# Patient Record
Sex: FEMALE | Race: WHITE | NOT HISPANIC OR LATINO | Employment: OTHER | URBAN - METROPOLITAN AREA
[De-identification: names, ages, dates, MRNs, and addresses within clinical notes are randomized per-mention and may not be internally consistent; named-entity substitution may affect disease eponyms.]

---

## 2017-03-21 ENCOUNTER — OFFICE VISIT (OUTPATIENT)
Dept: GASTROENTEROLOGY | Facility: CLINIC | Age: 72
End: 2017-03-21

## 2017-03-21 VITALS
SYSTOLIC BLOOD PRESSURE: 134 MMHG | HEIGHT: 63 IN | DIASTOLIC BLOOD PRESSURE: 82 MMHG | WEIGHT: 149 LBS | BODY MASS INDEX: 26.4 KG/M2

## 2017-03-21 DIAGNOSIS — R10.13 EPIGASTRIC PAIN: Primary | ICD-10-CM

## 2017-03-21 PROCEDURE — 99213 OFFICE O/P EST LOW 20 MIN: CPT | Performed by: INTERNAL MEDICINE

## 2017-03-21 RX ORDER — LOTEPREDNOL ETABONATE 5 MG/G
1 GEL OPHTHALMIC 4 TIMES DAILY PRN
COMMUNITY
Start: 2016-12-31

## 2017-03-21 NOTE — PROGRESS NOTES
"Chief Complaint   Patient presents with   • Abdominal Pain       Tish Urias is a  71 y.o. female here for a follow up visit for episodic abdominal pain.    HPI    Patient 71-year-old female status post ERCP and cystectomy.  Patient reports still getting intermittent epigastric pain approximately every 2 weeks since surgery.  Pain similar to pain prior to surgery.  Symptoms seem to be related to eating most notably after large meals.  Patient reports being at a restaurant recently and not making it to the parking lot before she started having severe abdominal cramps.  Patient reports pains typically last about 10 minutes and resolves spontaneously.  Sometimes related to bowel movements sometimes not.  Patient did have vomiting before the cholecystectomy but not since.  Patient with no fever or chills reports liver enzymes done family doctor were negative.    Past Medical History   Diagnosis Date   • Allergic rhinitis    • Anemia      HAS TAKEN IRON SUPPLEMENTS TO PREVENT   • Anesthesia complication      WITH CARPAL TUNNEL RELEASE, \"RELAXING MEDICINE\" HAD ISSUES WITH ITCHING\"   • Gallstones    • GERD (gastroesophageal reflux disease)    • History of osteoporosis    • History of varicose veins      WITH TORI LEG VEIN STRIPPING   • Hypertension    • Hypothyroidism    • Trigeminal neuralgia          Current Outpatient Prescriptions:   •  aspirin 81 MG tablet, Take 81 mg by mouth Daily., Disp: , Rfl:   •  atenolol (TENORMIN) 50 MG tablet, Take 50 mg by mouth Daily., Disp: , Rfl:   •  carBAMazepine (CARBATROL) 100 MG 12 hr capsule, Take 100 mg by mouth Every Evening., Disp: , Rfl:   •  cholecalciferol (VITAMIN D3) 1000 UNITS tablet, Take 1,000 Units by mouth Daily., Disp: , Rfl:   •  Ketotifen Fumarate (ALAWAY OP), Apply  to eye., Disp: , Rfl:   •  levothyroxine (SYNTHROID, LEVOTHROID) 88 MCG tablet, Take 88 mcg by mouth Daily., Disp: , Rfl:   •  loratadine (CLARITIN) 10 MG tablet, Take 10 mg by mouth Daily., Disp: , Rfl: "   •  LOTEMAX 0.5 % gel ophthalmic gel, , Disp: , Rfl:   •  meloxicam (MOBIC) 15 MG tablet, Take 15 mg by mouth Daily., Disp: , Rfl:   •  Nutritional Supplements (SALMON OIL) capsule, Take 1 capsule by mouth Daily., Disp: , Rfl:   •  omeprazole (priLOSEC) 20 MG capsule, Take 20 mg by mouth Daily., Disp: , Rfl:   •  Unable to find, 1 each 1 (One) Time. Steroid base opthalmic drop, Disp: , Rfl:     Allergies   Allergen Reactions   • Penicillins Rash       Social History     Social History   • Marital status:      Spouse name: N/A   • Number of children: N/A   • Years of education: N/A     Occupational History   • Not on file.     Social History Main Topics   • Smoking status: Never Smoker   • Smokeless tobacco: Not on file   • Alcohol use No   • Drug use: No   • Sexual activity: Defer     Other Topics Concern   • Not on file     Social History Narrative       History reviewed. No pertinent family history.    Review of Systems   Constitutional: Negative.    Respiratory: Negative.    Cardiovascular: Negative.    Gastrointestinal: Positive for abdominal pain and nausea. Negative for anal bleeding, blood in stool, constipation, diarrhea and vomiting.   Musculoskeletal: Negative.    Skin: Negative.        Vitals:    03/21/17 0939   BP: 134/82       Physical Exam   Constitutional: She is oriented to person, place, and time. She appears well-developed and well-nourished.   HENT:   Head: Normocephalic and atraumatic.   Eyes: EOM are normal. Pupils are equal, round, and reactive to light. No scleral icterus.   Cardiovascular: Normal rate, regular rhythm and normal heart sounds.  Exam reveals no gallop and no friction rub.    No murmur heard.  Pulmonary/Chest: Effort normal.   Abdominal: Soft. Bowel sounds are normal. She exhibits no distension and no mass. There is no tenderness. No hernia.   Musculoskeletal: Normal range of motion.   Neurological: She is alert and oriented to person, place, and time.   Skin: Skin is  dry. No rash noted.   Psychiatric: She has a normal mood and affect. Her behavior is normal.   Vitals reviewed.      No visits with results within 2 Month(s) from this visit.  Latest known visit with results is:    Admission on 12/16/2016, Discharged on 12/17/2016   Component Date Value Ref Range Status   • Case Report 12/16/2016    Final                    Value:Surgical Pathology Report                         Case: [id number]                                  Authorizing Provider:  Chaitanya Elizabeth MD        Collected:           12/16/2016 12:19 PM          Ordering Location:     [facility]  Received:            12/16/2016 01:51 PM                                 [facility]                                                                       Pathologist:           Hoang Sommers MD                                                          Specimen:    Gallbladder, gall bladder                                                                 • Final Diagnosis 12/16/2016    Final                    Value:This result contains rich text formatting which cannot be displayed here.   • Gross Description 12/16/2016    Final                    Value:This result contains rich text formatting which cannot be displayed here.   • Microscopic Description 12/16/2016    Final                    Value:This result contains rich text formatting which cannot be displayed here.   • Glucose 12/17/2016 113* 65 - 99 mg/dL Final   • BUN 12/17/2016 13  8 - 23 mg/dL Final   • Creatinine 12/17/2016 0.98  0.57 - 1.00 mg/dL Final   • Sodium 12/17/2016 140  136 - 145 mmol/L Final   • Potassium 12/17/2016 4.2  3.5 - 5.2 mmol/L Final   • Chloride 12/17/2016 102  98 - 107 mmol/L Final   • CO2 12/17/2016 24.2  22.0 - 29.0 mmol/L Final   • Calcium 12/17/2016 9.0  8.6 - 10.5 mg/dL Final   • eGFR Non African Amer 12/17/2016 56* >60 mL/min/1.73 Final   • BUN/Creatinine Ratio 12/17/2016 13.3  7.0 - 25.0 Final   • Anion Gap 12/17/2016 13.8   mmol/L Final   • WBC 12/17/2016 9.82  4.50 - 10.70 10*3/mm3 Final   • RBC 12/17/2016 3.91  3.90 - 5.20 10*6/mm3 Final   • Hemoglobin 12/17/2016 11.4* 11.9 - 15.5 g/dL Final   • Hematocrit 12/17/2016 35.5* 35.6 - 45.5 % Final   • MCV 12/17/2016 90.8  80.5 - 98.2 fL Final   • MCH 12/17/2016 29.2  26.9 - 32.0 pg Final   • MCHC 12/17/2016 32.1* 32.4 - 36.3 g/dL Final   • RDW 12/17/2016 14.2* 11.7 - 13.0 % Final   • RDW-SD 12/17/2016 46.7  37.0 - 54.0 fl Final   • MPV 12/17/2016 10.0  6.0 - 12.0 fL Final   • Platelets 12/17/2016 231  140 - 500 10*3/mm3 Final   • Neutrophil % 12/17/2016 70.3  42.7 - 76.0 % Final   • Lymphocyte % 12/17/2016 21.8  19.6 - 45.3 % Final   • Monocyte % 12/17/2016 7.1  5.0 - 12.0 % Final   • Eosinophil % 12/17/2016 0.4  0.3 - 6.2 % Final   • Basophil % 12/17/2016 0.2  0.0 - 1.5 % Final   • Immature Grans % 12/17/2016 0.2  0.0 - 0.5 % Final   • Neutrophils, Absolute 12/17/2016 6.90  1.90 - 8.10 10*3/mm3 Final   • Lymphocytes, Absolute 12/17/2016 2.14  0.90 - 4.80 10*3/mm3 Final   • Monocytes, Absolute 12/17/2016 0.70  0.20 - 1.20 10*3/mm3 Final   • Eosinophils, Absolute 12/17/2016 0.04  0.00 - 0.70 10*3/mm3 Final   • Basophils, Absolute 12/17/2016 0.02  0.00 - 0.20 10*3/mm3 Final   • Immature Grans, Absolute 12/17/2016 0.02  0.00 - 0.03 10*3/mm3 Final       Tish was seen today for abdominal pain.    Diagnoses and all orders for this visit:    Epigastric pain  -     Case Request; Standing  -     Case Request    Other orders  -     Implement Anesthesia orders day of procedure.; Standing  -     Obtain informed consent; Standing       Patient status post laparoscopic cholecystectomy and ERCP for gallstones and common duct stones reports still having episodes of epigastric pain and post prandial lasting up to about 10 minutes.  Patient reports symptoms disappear spontaneously off and related to a bowel movement. Pain almost allways associated with eating particularly large meals.  At this point will  arrange EGD as symptoms always seem to be related to eating.  Consider biopsies for celiac.  And follow clinical response.

## 2017-04-17 ENCOUNTER — HOSPITAL ENCOUNTER (OUTPATIENT)
Facility: HOSPITAL | Age: 72
Setting detail: HOSPITAL OUTPATIENT SURGERY
Discharge: HOME | End: 2017-04-17
Attending: INTERNAL MEDICINE | Admitting: INTERNAL MEDICINE

## 2017-04-17 ENCOUNTER — ANESTHESIA (OUTPATIENT)
Dept: GASTROENTEROLOGY | Facility: HOSPITAL | Age: 72
End: 2017-04-17

## 2017-04-17 ENCOUNTER — ANESTHESIA EVENT (OUTPATIENT)
Dept: GASTROENTEROLOGY | Facility: HOSPITAL | Age: 72
End: 2017-04-17

## 2017-04-17 VITALS
RESPIRATION RATE: 16 BRPM | HEART RATE: 56 BPM | OXYGEN SATURATION: 97 % | HEIGHT: 63 IN | SYSTOLIC BLOOD PRESSURE: 114 MMHG | TEMPERATURE: 97.8 F | WEIGHT: 145.06 LBS | BODY MASS INDEX: 25.7 KG/M2 | DIASTOLIC BLOOD PRESSURE: 80 MMHG

## 2017-04-17 DIAGNOSIS — R10.13 EPIGASTRIC PAIN: ICD-10-CM

## 2017-04-17 LAB
ANION GAP SERPL CALCULATED.3IONS-SCNC: 10.5 MMOL/L
BUN BLD-MCNC: 15 MG/DL (ref 8–23)
BUN/CREAT SERPL: 17.2 (ref 7–25)
CALCIUM SPEC-SCNC: 8.9 MG/DL (ref 8.6–10.5)
CHLORIDE SERPL-SCNC: 106 MMOL/L (ref 98–107)
CO2 SERPL-SCNC: 27.5 MMOL/L (ref 22–29)
CREAT BLD-MCNC: 0.87 MG/DL (ref 0.57–1)
GFR SERPL CREATININE-BSD FRML MDRD: 65 ML/MIN/1.73
GLUCOSE BLD-MCNC: 91 MG/DL (ref 65–99)
POTASSIUM BLD-SCNC: 5 MMOL/L (ref 3.5–5.2)
SODIUM BLD-SCNC: 144 MMOL/L (ref 136–145)

## 2017-04-17 PROCEDURE — 83516 IMMUNOASSAY NONANTIBODY: CPT | Performed by: INTERNAL MEDICINE

## 2017-04-17 PROCEDURE — 88312 SPECIAL STAINS GROUP 1: CPT | Performed by: INTERNAL MEDICINE

## 2017-04-17 PROCEDURE — 80048 BASIC METABOLIC PNL TOTAL CA: CPT | Performed by: INTERNAL MEDICINE

## 2017-04-17 PROCEDURE — 88305 TISSUE EXAM BY PATHOLOGIST: CPT | Performed by: INTERNAL MEDICINE

## 2017-04-17 PROCEDURE — 25010000002 PROPOFOL 10 MG/ML EMULSION: Performed by: ANESTHESIOLOGY

## 2017-04-17 PROCEDURE — 43239 EGD BIOPSY SINGLE/MULTIPLE: CPT | Performed by: INTERNAL MEDICINE

## 2017-04-17 PROCEDURE — 36415 COLL VENOUS BLD VENIPUNCTURE: CPT | Performed by: INTERNAL MEDICINE

## 2017-04-17 RX ORDER — LIDOCAINE HYDROCHLORIDE 20 MG/ML
INJECTION, SOLUTION INFILTRATION; PERINEURAL AS NEEDED
Status: DISCONTINUED | OUTPATIENT
Start: 2017-04-17 | End: 2017-04-17 | Stop reason: SURG

## 2017-04-17 RX ORDER — PROPOFOL 10 MG/ML
VIAL (ML) INTRAVENOUS CONTINUOUS PRN
Status: DISCONTINUED | OUTPATIENT
Start: 2017-04-17 | End: 2017-04-17 | Stop reason: SURG

## 2017-04-17 RX ORDER — PROPOFOL 10 MG/ML
VIAL (ML) INTRAVENOUS AS NEEDED
Status: DISCONTINUED | OUTPATIENT
Start: 2017-04-17 | End: 2017-04-17 | Stop reason: SURG

## 2017-04-17 RX ORDER — SODIUM CHLORIDE, SODIUM LACTATE, POTASSIUM CHLORIDE, CALCIUM CHLORIDE 600; 310; 30; 20 MG/100ML; MG/100ML; MG/100ML; MG/100ML
1000 INJECTION, SOLUTION INTRAVENOUS CONTINUOUS PRN
Status: DISCONTINUED | OUTPATIENT
Start: 2017-04-17 | End: 2017-04-17 | Stop reason: HOSPADM

## 2017-04-17 RX ADMIN — SODIUM CHLORIDE, POTASSIUM CHLORIDE, SODIUM LACTATE AND CALCIUM CHLORIDE: 600; 310; 30; 20 INJECTION, SOLUTION INTRAVENOUS at 08:31

## 2017-04-17 RX ADMIN — SODIUM CHLORIDE, POTASSIUM CHLORIDE, SODIUM LACTATE AND CALCIUM CHLORIDE 1000 ML: 600; 310; 30; 20 INJECTION, SOLUTION INTRAVENOUS at 08:18

## 2017-04-17 RX ADMIN — PROPOFOL 100 MCG/KG/MIN: 10 INJECTION, EMULSION INTRAVENOUS at 08:39

## 2017-04-17 RX ADMIN — LIDOCAINE HYDROCHLORIDE 50 MG: 20 INJECTION, SOLUTION INFILTRATION; PERINEURAL at 08:34

## 2017-04-17 RX ADMIN — PROPOFOL 50 MG: 10 INJECTION, EMULSION INTRAVENOUS at 08:38

## 2017-04-17 NOTE — PLAN OF CARE
Problem: Patient Care Overview (Adult)  Goal: Adult Individualization and Mutuality  Outcome: Ongoing (interventions implemented as appropriate)  Goal: Discharge Needs Assessment  Outcome: Ongoing (interventions implemented as appropriate)    04/17/17 0756   Discharge Needs Assessment   Concerns To Be Addressed no discharge needs identified         Problem: GI Endoscopy (Adult)  Goal: Signs and Symptoms of Listed Potential Problems Will be Absent or Manageable (GI Endoscopy)  Outcome: Ongoing (interventions implemented as appropriate)    04/17/17 0756   GI Endoscopy   Problems Assessed (GI Endoscopy) all   Problems Present (GI Endoscopy) none

## 2017-04-17 NOTE — ANESTHESIA POSTPROCEDURE EVALUATION
Patient: Tish Urias    Procedure Summary     Date Anesthesia Start Anesthesia Stop Room / Location    04/17/17 0832 0848  [facility] /  [facility]       Procedure Diagnosis Surgeon Provider    ESOPHAGOGASTRODUODENOSCOPY WITH COLD BIOPSIES (N/A Esophagus) Epigastric pain; Gastritis  (Epigastric pain [R10.13]) MD Kofi Mello MD          Anesthesia Type: MAC  Last vitals  /67 (04/17/17 0850)    Temp 36.6 °C (97.8 °F) (04/17/17 0801)    Pulse 59 (04/17/17 0850)   Resp 14 (04/17/17 0850)    SpO2 99 % (04/17/17 0850)      Post Anesthesia Care and Evaluation    Patient location during evaluation: PACU  Patient participation: complete - patient participated  Level of consciousness: awake and alert  Pain management: adequate  Airway patency: patent  Anesthetic complications: No anesthetic complications    Cardiovascular status: acceptable  Respiratory status: acceptable  Hydration status: acceptable

## 2017-04-17 NOTE — BRIEF OP NOTE
ESOPHAGOGASTRODUODENOSCOPY  Procedure Note    Tish Urias  4/17/2017    Pre-op Diagnosis:   Epigastric pain [R10.13]    Post-op Diagnosis:     Post-Op Diagnosis Codes:     * Epigastric pain [R10.13]     * Gastritis [K29.70]    Procedure/CPT® Codes:      Procedure(s):  ESOPHAGOGASTRODUODENOSCOPY WITH COLD BIOPSIES    Surgeon(s):  Shaheen Andre MD    Anesthesia: Monitor Anesthesia Care    Staff:   Endo Technician: Maritza Ching  Endo Nurse: Kaila eVlasquez RN    Estimated Blood Loss: * No values recorded between 4/17/2017  8:32 AM and 4/17/2017  8:46 AM *  Urine Voided: * No values recorded between 4/17/2017  8:32 AM and 4/17/2017  8:46 AM *    Specimens:                  ID Type Source Tests Collected by Time Destination   A : DUODENAL BIOPSIES Tissue Small Intestine, Duodenum TISSUE EXAM Shaheen Andre MD 4/17/2017 0839    B : ANTRAL BIOPSIES Tissue Stomach TISSUE EXAM Shaheen Andre MD 4/17/2017 0839          Drains:    na       Findings: gastritis    Complications: none      Shaheen Andre MD     Date: 4/17/2017  Time: 8:46 AM

## 2017-04-17 NOTE — DISCHARGE INSTRUCTIONS
For the next 24 hours patient needs to be with a responsible adult.    For 24 hours DO NOT drive, operate machinery, appliances, drink alcohol, make important decisions or sign legal documents.    Start with a light or bland diet and advance to regular diet as tolerated.    Call Dr Andre for problems [phone]    Problems may include but not limited to: large amounts of bleeding, trouble breathing, repeated vomiting, severe unrelieved pain, fever or chills.

## 2017-04-17 NOTE — H&P (VIEW-ONLY)
"Chief Complaint   Patient presents with   • Abdominal Pain       Tish Urias is a  71 y.o. female here for a follow up visit for episodic abdominal pain.    HPI    Patient 71-year-old female status post ERCP and cystectomy.  Patient reports still getting intermittent epigastric pain approximately every 2 weeks since surgery.  Pain similar to pain prior to surgery.  Symptoms seem to be related to eating most notably after large meals.  Patient reports being at a restaurant recently and not making it to the parking lot before she started having severe abdominal cramps.  Patient reports pains typically last about 10 minutes and resolves spontaneously.  Sometimes related to bowel movements sometimes not.  Patient did have vomiting before the cholecystectomy but not since.  Patient with no fever or chills reports liver enzymes done family doctor were negative.    Past Medical History   Diagnosis Date   • Allergic rhinitis    • Anemia      HAS TAKEN IRON SUPPLEMENTS TO PREVENT   • Anesthesia complication      WITH CARPAL TUNNEL RELEASE, \"RELAXING MEDICINE\" HAD ISSUES WITH ITCHING\"   • Gallstones    • GERD (gastroesophageal reflux disease)    • History of osteoporosis    • History of varicose veins      WITH TORI LEG VEIN STRIPPING   • Hypertension    • Hypothyroidism    • Trigeminal neuralgia          Current Outpatient Prescriptions:   •  aspirin 81 MG tablet, Take 81 mg by mouth Daily., Disp: , Rfl:   •  atenolol (TENORMIN) 50 MG tablet, Take 50 mg by mouth Daily., Disp: , Rfl:   •  carBAMazepine (CARBATROL) 100 MG 12 hr capsule, Take 100 mg by mouth Every Evening., Disp: , Rfl:   •  cholecalciferol (VITAMIN D3) 1000 UNITS tablet, Take 1,000 Units by mouth Daily., Disp: , Rfl:   •  Ketotifen Fumarate (ALAWAY OP), Apply  to eye., Disp: , Rfl:   •  levothyroxine (SYNTHROID, LEVOTHROID) 88 MCG tablet, Take 88 mcg by mouth Daily., Disp: , Rfl:   •  loratadine (CLARITIN) 10 MG tablet, Take 10 mg by mouth Daily., Disp: , Rfl: "   •  LOTEMAX 0.5 % gel ophthalmic gel, , Disp: , Rfl:   •  meloxicam (MOBIC) 15 MG tablet, Take 15 mg by mouth Daily., Disp: , Rfl:   •  Nutritional Supplements (SALMON OIL) capsule, Take 1 capsule by mouth Daily., Disp: , Rfl:   •  omeprazole (priLOSEC) 20 MG capsule, Take 20 mg by mouth Daily., Disp: , Rfl:   •  Unable to find, 1 each 1 (One) Time. Steroid base opthalmic drop, Disp: , Rfl:     Allergies   Allergen Reactions   • Penicillins Rash       Social History     Social History   • Marital status:      Spouse name: N/A   • Number of children: N/A   • Years of education: N/A     Occupational History   • Not on file.     Social History Main Topics   • Smoking status: Never Smoker   • Smokeless tobacco: Not on file   • Alcohol use No   • Drug use: No   • Sexual activity: Defer     Other Topics Concern   • Not on file     Social History Narrative       History reviewed. No pertinent family history.    Review of Systems   Constitutional: Negative.    Respiratory: Negative.    Cardiovascular: Negative.    Gastrointestinal: Positive for abdominal pain and nausea. Negative for anal bleeding, blood in stool, constipation, diarrhea and vomiting.   Musculoskeletal: Negative.    Skin: Negative.        Vitals:    03/21/17 0939   BP: 134/82       Physical Exam   Constitutional: She is oriented to person, place, and time. She appears well-developed and well-nourished.   HENT:   Head: Normocephalic and atraumatic.   Eyes: EOM are normal. Pupils are equal, round, and reactive to light. No scleral icterus.   Cardiovascular: Normal rate, regular rhythm and normal heart sounds.  Exam reveals no gallop and no friction rub.    No murmur heard.  Pulmonary/Chest: Effort normal.   Abdominal: Soft. Bowel sounds are normal. She exhibits no distension and no mass. There is no tenderness. No hernia.   Musculoskeletal: Normal range of motion.   Neurological: She is alert and oriented to person, place, and time.   Skin: Skin is  dry. No rash noted.   Psychiatric: She has a normal mood and affect. Her behavior is normal.   Vitals reviewed.      No visits with results within 2 Month(s) from this visit.  Latest known visit with results is:    Admission on 12/16/2016, Discharged on 12/17/2016   Component Date Value Ref Range Status   • Case Report 12/16/2016    Final                    Value:Surgical Pathology Report                         Case: [id number]                                  Authorizing Provider:  Chaitanya Elizabeth MD        Collected:           12/16/2016 12:19 PM          Ordering Location:     [facility]  Received:            12/16/2016 01:51 PM                                 [facility]                                                                       Pathologist:           Hoang Sommers MD                                                          Specimen:    Gallbladder, gall bladder                                                                 • Final Diagnosis 12/16/2016    Final                    Value:This result contains rich text formatting which cannot be displayed here.   • Gross Description 12/16/2016    Final                    Value:This result contains rich text formatting which cannot be displayed here.   • Microscopic Description 12/16/2016    Final                    Value:This result contains rich text formatting which cannot be displayed here.   • Glucose 12/17/2016 113* 65 - 99 mg/dL Final   • BUN 12/17/2016 13  8 - 23 mg/dL Final   • Creatinine 12/17/2016 0.98  0.57 - 1.00 mg/dL Final   • Sodium 12/17/2016 140  136 - 145 mmol/L Final   • Potassium 12/17/2016 4.2  3.5 - 5.2 mmol/L Final   • Chloride 12/17/2016 102  98 - 107 mmol/L Final   • CO2 12/17/2016 24.2  22.0 - 29.0 mmol/L Final   • Calcium 12/17/2016 9.0  8.6 - 10.5 mg/dL Final   • eGFR Non African Amer 12/17/2016 56* >60 mL/min/1.73 Final   • BUN/Creatinine Ratio 12/17/2016 13.3  7.0 - 25.0 Final   • Anion Gap 12/17/2016 13.8   mmol/L Final   • WBC 12/17/2016 9.82  4.50 - 10.70 10*3/mm3 Final   • RBC 12/17/2016 3.91  3.90 - 5.20 10*6/mm3 Final   • Hemoglobin 12/17/2016 11.4* 11.9 - 15.5 g/dL Final   • Hematocrit 12/17/2016 35.5* 35.6 - 45.5 % Final   • MCV 12/17/2016 90.8  80.5 - 98.2 fL Final   • MCH 12/17/2016 29.2  26.9 - 32.0 pg Final   • MCHC 12/17/2016 32.1* 32.4 - 36.3 g/dL Final   • RDW 12/17/2016 14.2* 11.7 - 13.0 % Final   • RDW-SD 12/17/2016 46.7  37.0 - 54.0 fl Final   • MPV 12/17/2016 10.0  6.0 - 12.0 fL Final   • Platelets 12/17/2016 231  140 - 500 10*3/mm3 Final   • Neutrophil % 12/17/2016 70.3  42.7 - 76.0 % Final   • Lymphocyte % 12/17/2016 21.8  19.6 - 45.3 % Final   • Monocyte % 12/17/2016 7.1  5.0 - 12.0 % Final   • Eosinophil % 12/17/2016 0.4  0.3 - 6.2 % Final   • Basophil % 12/17/2016 0.2  0.0 - 1.5 % Final   • Immature Grans % 12/17/2016 0.2  0.0 - 0.5 % Final   • Neutrophils, Absolute 12/17/2016 6.90  1.90 - 8.10 10*3/mm3 Final   • Lymphocytes, Absolute 12/17/2016 2.14  0.90 - 4.80 10*3/mm3 Final   • Monocytes, Absolute 12/17/2016 0.70  0.20 - 1.20 10*3/mm3 Final   • Eosinophils, Absolute 12/17/2016 0.04  0.00 - 0.70 10*3/mm3 Final   • Basophils, Absolute 12/17/2016 0.02  0.00 - 0.20 10*3/mm3 Final   • Immature Grans, Absolute 12/17/2016 0.02  0.00 - 0.03 10*3/mm3 Final       Tish was seen today for abdominal pain.    Diagnoses and all orders for this visit:    Epigastric pain  -     Case Request; Standing  -     Case Request    Other orders  -     Implement Anesthesia orders day of procedure.; Standing  -     Obtain informed consent; Standing       Patient status post laparoscopic cholecystectomy and ERCP for gallstones and common duct stones reports still having episodes of epigastric pain and post prandial lasting up to about 10 minutes.  Patient reports symptoms disappear spontaneously off and related to a bowel movement. Pain almost allways associated with eating particularly large meals.  At this point will  arrange EGD as symptoms always seem to be related to eating.  Consider biopsies for celiac.  And follow clinical response.

## 2017-04-18 LAB
CYTO UR: NORMAL
GLIADIN PEPTIDE IGA SER-ACNC: 2 UNITS (ref 0–19)
GLIADIN PEPTIDE IGG SER-ACNC: 3 UNITS (ref 0–19)
IGA SERPL-MCNC: 79 MG/DL (ref 87–352)
LAB AP CASE REPORT: NORMAL
Lab: NORMAL
PATH REPORT.FINAL DX SPEC: NORMAL
PATH REPORT.GROSS SPEC: NORMAL
TTG IGA SER-ACNC: <2 U/ML (ref 0–3)
TTG IGG SER-ACNC: <2 U/ML (ref 0–5)

## 2017-04-21 ENCOUNTER — TELEPHONE (OUTPATIENT)
Dept: GASTROENTEROLOGY | Facility: CLINIC | Age: 72
End: 2017-04-21

## 2017-04-21 NOTE — TELEPHONE ENCOUNTER
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  Recommend patient follow up with allergy and immunology for further recommendations.

## 2017-04-21 NOTE — TELEPHONE ENCOUNTER
Patient called advised as per Dr. Anders's note her biopsies were normal, but her IgA was deficient. Advised she should f/u with an allergist/immunologist for further recommendations.  She states she was planning to do this next week.

## 2017-04-24 ENCOUNTER — HOSPITAL ENCOUNTER (OUTPATIENT)
Dept: CT IMAGING | Facility: HOSPITAL | Age: 72
Discharge: HOME | End: 2017-04-24
Attending: INTERNAL MEDICINE | Admitting: INTERNAL MEDICINE

## 2017-04-24 DIAGNOSIS — R10.13 EPIGASTRIC PAIN: ICD-10-CM

## 2017-04-24 LAB — CREAT BLDA-MCNC: 1 MG/DL (ref 0.6–1.3)

## 2017-04-24 PROCEDURE — 74177 CT ABD & PELVIS W/CONTRAST: CPT

## 2017-04-24 PROCEDURE — 0 IOPAMIDOL 61 % SOLUTION: Performed by: INTERNAL MEDICINE

## 2017-04-24 PROCEDURE — 82565 ASSAY OF CREATININE: CPT

## 2017-04-24 RX ADMIN — IOPAMIDOL 85 ML: 612 INJECTION, SOLUTION INTRAVENOUS at 14:45

## 2017-05-01 ENCOUNTER — TELEPHONE (OUTPATIENT)
Dept: GASTROENTEROLOGY | Facility: CLINIC | Age: 72
End: 2017-05-01

## 2017-07-11 ENCOUNTER — OFFICE VISIT (OUTPATIENT)
Dept: GASTROENTEROLOGY | Facility: CLINIC | Age: 72
End: 2017-07-11

## 2017-07-11 ENCOUNTER — TELEPHONE (OUTPATIENT)
Dept: GASTROENTEROLOGY | Facility: CLINIC | Age: 72
End: 2017-07-11

## 2017-07-11 VITALS
WEIGHT: 143 LBS | HEIGHT: 63 IN | SYSTOLIC BLOOD PRESSURE: 114 MMHG | DIASTOLIC BLOOD PRESSURE: 82 MMHG | BODY MASS INDEX: 25.34 KG/M2

## 2017-07-11 DIAGNOSIS — K58.9 IRRITABLE BOWEL SYNDROME WITHOUT DIARRHEA: Primary | ICD-10-CM

## 2017-07-11 PROCEDURE — 99213 OFFICE O/P EST LOW 20 MIN: CPT | Performed by: INTERNAL MEDICINE

## 2017-07-11 RX ORDER — MONTELUKAST SODIUM 10 MG/1
10 TABLET ORAL NIGHTLY
COMMUNITY
End: 2021-08-10 | Stop reason: SDUPTHER

## 2017-07-11 RX ORDER — PREGABALIN 75 MG/1
75 CAPSULE ORAL 2 TIMES DAILY
Qty: 60 CAPSULE | Refills: 11 | Status: SHIPPED | OUTPATIENT
Start: 2017-07-11 | End: 2017-09-12

## 2017-07-11 NOTE — PROGRESS NOTES
Answers for HPI/ROS submitted by the patient on 7/11/2017   Abdominal pain  Chronicity: chronic  Onset: more than 1 month ago  Onset quality: gradual  Frequency: every several days  Episode duration: 1 hours  Progression since onset: unchanged  Pain location: epigastric region  Pain - numeric: 3/10  Pain quality: cramping  Radiates to: does not radiate  anorexia: No  arthralgias: No  belching: No  constipation: No  diarrhea: No  dysuria: No  fever: No  flatus: No  frequency: No  headaches: No  hematochezia: No  hematuria: No  melena: No  myalgias: No  nausea: No  weight loss: No  vomiting: Yes  Aggravated by: nothing  Relieved by: nothing, bowel movements, vomiting  Diagnostic workup: CT scan, ultrasound, upper endoscopy  Chief Complaint   Patient presents with   • Follow-up       Tish Urias is a  71 y.o. female here for a follow up visit for Recurrent abdominal pain.    HPI     Patient 71-year-old female with recurrent upper abdominal pain history of gallstones for which she actually had her gallbladder removed for these symptoms but were not relieved when the gallbladder was out.  Patient's symptoms have been unabated since the cholecystectomy.  The pain is upper abdominal radiates across the abdomen and she points to just above the navel cross the right and left upper abdomen.  Patient reports the pain starts slowly increases in intensity and at times will lead to nausea or vomiting or urgent bowel movement may actually relieve the symptoms.  If she doesn't get that severe the pain usually will pass within 10 minutes leaving no residual discomfort worse soreness.  Symptoms may happen daily or Weeks apart.  Patient with no weight loss no change in diet or appetite.  Workup so far is negative except for mildly decreased IgA.  Patient seen by allergy but no specific increased risk factors or signs of IgA deficiency infections.    Past Medical History:   Diagnosis Date   • Allergic rhinitis    • Anemia     HAS TAKEN  "IRON SUPPLEMENTS TO PREVENT   • Anesthesia complication     WITH CARPAL TUNNEL RELEASE, \"RELAXING MEDICINE\" HAD ISSUES WITH ITCHING\"   • Gallstones    • GERD (gastroesophageal reflux disease)    • History of osteoporosis    • History of varicose veins     WITH TORI LEG VEIN STRIPPING   • Hypertension    • Hypothyroidism    • Trigeminal neuralgia          Current Outpatient Prescriptions:   •  aspirin 81 MG tablet, Take 81 mg by mouth Daily., Disp: , Rfl:   •  atenolol (TENORMIN) 50 MG tablet, Take 50 mg by mouth Daily., Disp: , Rfl:   •  carBAMazepine (CARBATROL) 100 MG 12 hr capsule, Take 100 mg by mouth Every Evening., Disp: , Rfl:   •  cholecalciferol (VITAMIN D3) 1000 UNITS tablet, Take 1,000 Units by mouth Daily., Disp: , Rfl:   •  Ketotifen Fumarate (ALAWAY OP), Apply  to eye., Disp: , Rfl:   •  levothyroxine (SYNTHROID, LEVOTHROID) 88 MCG tablet, Take 88 mcg by mouth Daily., Disp: , Rfl:   •  loratadine (CLARITIN) 10 MG tablet, Take 10 mg by mouth Daily., Disp: , Rfl:   •  LOTEMAX 0.5 % gel ophthalmic gel, , Disp: , Rfl:   •  meloxicam (MOBIC) 15 MG tablet, Take 15 mg by mouth Daily., Disp: , Rfl:   •  montelukast (SINGULAIR) 10 MG tablet, Take 10 mg by mouth Every Night., Disp: , Rfl:   •  Nutritional Supplements (SALMON OIL) capsule, Take 1 capsule by mouth Daily., Disp: , Rfl:   •  omeprazole (priLOSEC) 20 MG capsule, Take 20 mg by mouth Daily., Disp: , Rfl:   •  Unable to find, 1 each 1 (One) Time. Steroid base opthalmic drop, Disp: , Rfl:   •  pregabalin (LYRICA) 75 MG capsule, Take 1 capsule by mouth 2 (Two) Times a Day., Disp: 60 capsule, Rfl: 11    Allergies   Allergen Reactions   • Penicillins Rash       Social History     Social History   • Marital status:      Spouse name: N/A   • Number of children: N/A   • Years of education: N/A     Occupational History   • Not on file.     Social History Main Topics   • Smoking status: Never Smoker   • Smokeless tobacco: Not on file   • Alcohol use No   • " Drug use: No   • Sexual activity: Defer     Other Topics Concern   • Not on file     Social History Narrative       History reviewed. No pertinent family history.    Review of Systems   Constitutional: Negative for fever.   HENT: Negative.    Respiratory: Negative.    Cardiovascular: Negative.    Gastrointestinal: Positive for abdominal pain and vomiting. Negative for abdominal distention, blood in stool, constipation, diarrhea and nausea.   Genitourinary: Negative for dysuria, frequency and hematuria.   Musculoskeletal: Negative for arthralgias, back pain, gait problem and myalgias.   Neurological: Negative for headaches.   Hematological: Negative.        Vitals:    07/11/17 1307   BP: 114/82       Physical Exam   Constitutional: She is oriented to person, place, and time. She appears well-developed and well-nourished.   HENT:   Head: Normocephalic and atraumatic.   Eyes: EOM are normal. Pupils are equal, round, and reactive to light. No scleral icterus.   Cardiovascular: Normal rate, regular rhythm and normal heart sounds.  Exam reveals no gallop and no friction rub.    No murmur heard.  Pulmonary/Chest: Effort normal. She has no wheezes. She has no rales.   Abdominal: Soft. Bowel sounds are normal. She exhibits no distension and no mass. There is no tenderness. No hernia.   Musculoskeletal: Normal range of motion.   Neurological: She is alert and oriented to person, place, and time.   Skin: Skin is dry.   Psychiatric: She has a normal mood and affect. Her behavior is normal.   Vitals reviewed.      No visits with results within 2 Month(s) from this visit.  Latest known visit with results is:    Hospital Outpatient Visit on 04/24/2017   Component Date Value Ref Range Status   • Creatinine 04/24/2017 1.00  0.60 - 1.30 mg/dL Final       Tish was seen today for follow-up.    Diagnoses and all orders for this visit:    Irritable bowel syndrome without diarrhea    Other orders  -     pregabalin (LYRICA) 75 MG capsule;  Take 1 capsule by mouth 2 (Two) Times a Day.      Patient with recurrent abdominal pain occurring above the navel cross the upper abdomen.  Pain is slow crescendo to severe pain at which point she may have nausea and vomiting or urgent stool which will give immediate relief but often will just pass leaving no residual discomfort may be days before it recurs.  Patient has no specific triggers for these attacks not relieved related temporally to eating or drinking but over the last several weeks for the first time is noted being woken up at night with these symptoms.  Review of her CT reveals some stranding in the upper abdomen focally that seems to correlate with her gallbladder scar but this area is nontender and exam is otherwise negative.  Patient reports no melena or bright red blood per rectum.  Patient reports no change in bowel habits or appetite.  This point it seems more related to IBS and intestinal spasm in this patient's bowels are normal will give trial of Lyrica to see if we can suppress these abnormal motility patterns.  We'll schedule a follow-up in 2 months but plan for patient to call within a week to let us know how she is responding to medication.

## 2017-07-11 NOTE — TELEPHONE ENCOUNTER
----- Message from Eliseo Moran sent at 7/11/2017  3:37 PM EDT -----  Regarding: PHARM CALLED (FYI)  PHARM CALLED ABOUT PT MEDICATION (LYRICA) STATED  PUT 11 REFILLS ON MEDICATION, BY LAW THEY CAN ONLY GO UP TO 5 REFILLS. PHARM CALLED TO LET US KNOW THAT THEY WAS CHANGING IT TO 5 REFILLS

## 2017-09-12 ENCOUNTER — OFFICE VISIT (OUTPATIENT)
Dept: GASTROENTEROLOGY | Facility: CLINIC | Age: 72
End: 2017-09-12

## 2017-09-12 VITALS
WEIGHT: 145.2 LBS | SYSTOLIC BLOOD PRESSURE: 118 MMHG | DIASTOLIC BLOOD PRESSURE: 74 MMHG | BODY MASS INDEX: 26.72 KG/M2 | TEMPERATURE: 98.5 F | HEIGHT: 62 IN

## 2017-09-12 DIAGNOSIS — K58.9 IRRITABLE BOWEL SYNDROME WITHOUT DIARRHEA: Primary | ICD-10-CM

## 2017-09-12 PROCEDURE — 99213 OFFICE O/P EST LOW 20 MIN: CPT | Performed by: INTERNAL MEDICINE

## 2017-09-12 NOTE — PROGRESS NOTES
"Chief Complaint   Patient presents with   • Abdominal Pain   • Constipation       Tish Urias is a  71 y.o. female here for a follow up visit for Irritable bowel.    HPI    Patient 71-year-old female with recurrent abdominal pain felt secondary to irritable bowel.  Patient sent home in July with Lyrica therapy.  Patient was to take 75 mg twice a day and follow for clinical response.  Patient reports to the first tablets at night and woke up sleepy and a little dizzy.  Patient called pharmacy told her she can try taking 1 a day for a few days and as she got used to it to try to take the second but patient felt that the one a day being that strong the second might make her nonfunctional and stable 20 day.  Patient reports taking one nightly for a month with no further attacks.  Patient had been having one to 2 attacks weekly prior to therapy.  After 4 weeks of no symptoms patient then stop the medication and for the last 4 weeks is been on nothing but still has had no further attacks of her abdominal pain.  Patient denies nausea vomiting had 1 or 2 episodes of constipation during the therapy but none since.  Patient treated the constipation with Dulcolax without difficulty.    Past Medical History:   Diagnosis Date   • Allergic rhinitis    • Anemia     HAS TAKEN IRON SUPPLEMENTS TO PREVENT   • Anesthesia complication     WITH CARPAL TUNNEL RELEASE, \"RELAXING MEDICINE\" HAD ISSUES WITH ITCHING\"   • Gallstones    • GERD (gastroesophageal reflux disease)    • History of osteoporosis    • History of varicose veins     WITH TORI LEG VEIN STRIPPING   • Hypertension    • Hypothyroidism    • Trigeminal neuralgia          Current Outpatient Prescriptions:   •  atenolol (TENORMIN) 50 MG tablet, Take 50 mg by mouth Daily., Disp: , Rfl:   •  carBAMazepine (CARBATROL) 100 MG 12 hr capsule, Take 100 mg by mouth Every Evening., Disp: , Rfl:   •  cholecalciferol (VITAMIN D3) 1000 UNITS tablet, Take 1,000 Units by mouth Daily., Disp: , " Rfl:   •  Ketotifen Fumarate (ALAWAY OP), Apply  to eye., Disp: , Rfl:   •  levothyroxine (SYNTHROID, LEVOTHROID) 88 MCG tablet, Take 88 mcg by mouth Daily., Disp: , Rfl:   •  loratadine (CLARITIN) 10 MG tablet, Take 10 mg by mouth Daily., Disp: , Rfl:   •  meloxicam (MOBIC) 15 MG tablet, Take 15 mg by mouth Daily., Disp: , Rfl:   •  montelukast (SINGULAIR) 10 MG tablet, Take 10 mg by mouth Every Night., Disp: , Rfl:   •  Nutritional Supplements (SALMON OIL) capsule, Take 1 capsule by mouth Daily., Disp: , Rfl:   •  omeprazole (priLOSEC) 20 MG capsule, Take 20 mg by mouth Daily., Disp: , Rfl:   •  aspirin 81 MG tablet, Take 81 mg by mouth Daily., Disp: , Rfl:   •  LOTEMAX 0.5 % gel ophthalmic gel, , Disp: , Rfl:   •  Unable to find, 1 each 1 (One) Time. Steroid base opthalmic drop, Disp: , Rfl:     Allergies   Allergen Reactions   • Penicillins Rash       Social History     Social History   • Marital status:      Spouse name: N/A   • Number of children: N/A   • Years of education: N/A     Occupational History   • Not on file.     Social History Main Topics   • Smoking status: Never Smoker   • Smokeless tobacco: Not on file   • Alcohol use No   • Drug use: No   • Sexual activity: Defer     Other Topics Concern   • Not on file     Social History Narrative       History reviewed. No pertinent family history.    Review of Systems   Constitutional: Negative.    HENT: Negative.    Respiratory: Negative.    Cardiovascular: Negative.    Gastrointestinal: Negative.    Skin: Negative.    Hematological: Negative.        Vitals:    09/12/17 1003   BP: 118/74   Temp: 98.5 °F (36.9 °C)       Physical Exam   Constitutional: She is oriented to person, place, and time. She appears well-developed and well-nourished.   HENT:   Head: Normocephalic and atraumatic.   Eyes: Pupils are equal, round, and reactive to light. No scleral icterus.   Abdominal: Soft. Bowel sounds are normal. She exhibits no distension and no mass. There is  no tenderness. No hernia.   Neurological: She is alert and oriented to person, place, and time.   Skin: Skin is warm and dry. No rash noted.   Psychiatric: She has a normal mood and affect. Her behavior is normal. Thought content normal.   Vitals reviewed.      No visits with results within 2 Month(s) from this visit.  Latest known visit with results is:    Hospital Outpatient Visit on 04/24/2017   Component Date Value Ref Range Status   • Creatinine 04/24/2017 1.00  0.60 - 1.30 mg/dL Final       Tish was seen today for abdominal pain and constipation.    Diagnoses and all orders for this visit:    Irritable bowel syndrome without diarrhea      Patient reports complete resolution of her symptoms after 4 weeks of Lyrica.  Patient reports was taking 1 Lyrica nightly as she developed significant sleepiness in the morning when she took it.  Patient reports having symptoms 1-2 days per week every week perforce therapy.  Once she started on Lyrica for the 4 weeks on therapy she had no more attacks and now off therapy for another 4 weeks has had no further symptoms.  Patient reports bowels are normal with no further abdominal pain.  At this point patient would like to hold for the medication and follow for symptom recurrence.  Agree at this point to monitor for recurrence as needed.  Patient told to hold onto residual medication if symptoms were to arise and restart the therapy.  For now we'll be available as needed.

## 2018-03-26 ENCOUNTER — OFFICE VISIT CONVERTED (OUTPATIENT)
Dept: FAMILY MEDICINE CLINIC | Age: 73
End: 2018-03-26
Attending: FAMILY MEDICINE

## 2018-08-24 ENCOUNTER — OFFICE VISIT CONVERTED (OUTPATIENT)
Dept: FAMILY MEDICINE CLINIC | Age: 73
End: 2018-08-24
Attending: FAMILY MEDICINE

## 2018-08-28 ENCOUNTER — CONVERSION ENCOUNTER (OUTPATIENT)
Dept: OTHER | Facility: HOSPITAL | Age: 73
End: 2018-08-28

## 2019-01-28 ENCOUNTER — OFFICE VISIT CONVERTED (OUTPATIENT)
Dept: FAMILY MEDICINE CLINIC | Age: 74
End: 2019-01-28
Attending: FAMILY MEDICINE

## 2019-03-25 ENCOUNTER — OFFICE VISIT CONVERTED (OUTPATIENT)
Dept: FAMILY MEDICINE CLINIC | Age: 74
End: 2019-03-25
Attending: FAMILY MEDICINE

## 2019-03-25 ENCOUNTER — HOSPITAL ENCOUNTER (OUTPATIENT)
Dept: OTHER | Facility: HOSPITAL | Age: 74
Discharge: HOME | End: 2019-03-25
Attending: FAMILY MEDICINE

## 2019-03-25 LAB
ALBUMIN SERPL-MCNC: 3.9 G/DL (ref 3.5–5)
ALBUMIN/GLOB SERPL: 1.3 {RATIO} (ref 1.4–2.6)
ALP SERPL-CCNC: 95 U/L (ref 43–160)
ALT SERPL-CCNC: 18 U/L (ref 10–40)
ANION GAP SERPL CALC-SCNC: 18 MMOL/L (ref 8–19)
AST SERPL-CCNC: 21 U/L (ref 15–50)
BILIRUB SERPL-MCNC: 0.21 MG/DL (ref 0.2–1.3)
BUN SERPL-MCNC: 18 MG/DL (ref 5–25)
BUN/CREAT SERPL: 17 {RATIO} (ref 6–20)
CALCIUM SERPL-MCNC: 9 MG/DL (ref 8.7–10.4)
CHLORIDE SERPL-SCNC: 100 MMOL/L (ref 99–111)
CONV CO2: 24 MMOL/L (ref 22–32)
CONV TOTAL PROTEIN: 6.9 G/DL (ref 6.3–8.2)
CREAT UR-MCNC: 1.08 MG/DL (ref 0.5–0.9)
ERYTHROCYTE [DISTWIDTH] IN BLOOD BY AUTOMATED COUNT: 13.1 % (ref 11.5–14.5)
GFR SERPLBLD BASED ON 1.73 SQ M-ARVRAT: 51 ML/MIN/{1.73_M2}
GLOBULIN UR ELPH-MCNC: 3 G/DL (ref 2–3.5)
GLUCOSE SERPL-MCNC: 87 MG/DL (ref 65–99)
HBA1C MFR BLD: 12.8 G/DL (ref 12–16)
HCT VFR BLD AUTO: 37.2 % (ref 37–47)
MCH RBC QN AUTO: 29.8 PG (ref 27–31)
MCHC RBC AUTO-ENTMCNC: 34.4 G/DL (ref 33–37)
MCV RBC AUTO: 86.7 FL (ref 81–99)
OSMOLALITY SERPL CALC.SUM OF ELEC: 287 MOSM/KG (ref 273–304)
PLATELET # BLD AUTO: 225 10*3/UL (ref 130–400)
PMV BLD AUTO: 9.4 FL (ref 7.4–10.4)
POTASSIUM SERPL-SCNC: 4.1 MMOL/L (ref 3.5–5.3)
RBC # BLD AUTO: 4.29 10*6/UL (ref 4.2–5.4)
SODIUM SERPL-SCNC: 138 MMOL/L (ref 135–147)
TSH SERPL-ACNC: 3.02 M[IU]/L (ref 0.27–4.2)
WBC # BLD AUTO: 6.99 10*3/UL (ref 4.8–10.8)

## 2019-04-10 ENCOUNTER — OFFICE VISIT CONVERTED (OUTPATIENT)
Dept: FAMILY MEDICINE CLINIC | Age: 74
End: 2019-04-10
Attending: NURSE PRACTITIONER

## 2019-04-22 ENCOUNTER — APPOINTMENT (OUTPATIENT)
Dept: PREADMISSION TESTING | Facility: HOSPITAL | Age: 74
End: 2019-04-22

## 2019-04-22 VITALS
RESPIRATION RATE: 16 BRPM | DIASTOLIC BLOOD PRESSURE: 79 MMHG | WEIGHT: 155.9 LBS | HEART RATE: 61 BPM | TEMPERATURE: 98.1 F | HEIGHT: 63 IN | OXYGEN SATURATION: 96 % | SYSTOLIC BLOOD PRESSURE: 130 MMHG | BODY MASS INDEX: 27.62 KG/M2

## 2019-04-22 LAB
ALBUMIN SERPL-MCNC: 3.9 G/DL (ref 3.5–5.2)
ALBUMIN/GLOB SERPL: 1.5 G/DL
ALP SERPL-CCNC: 73 U/L (ref 39–117)
ALT SERPL W P-5'-P-CCNC: 22 U/L (ref 1–33)
ANION GAP SERPL CALCULATED.3IONS-SCNC: 11.4 MMOL/L
AST SERPL-CCNC: 21 U/L (ref 1–32)
BACTERIA UR QL AUTO: ABNORMAL /HPF
BASOPHILS # BLD AUTO: 0.04 10*3/MM3 (ref 0–0.2)
BASOPHILS NFR BLD AUTO: 0.6 % (ref 0–1.5)
BILIRUB SERPL-MCNC: 0.2 MG/DL (ref 0.2–1.2)
BILIRUB UR QL STRIP: NEGATIVE
BUN BLD-MCNC: 15 MG/DL (ref 8–23)
BUN/CREAT SERPL: 17.9 (ref 7–25)
CALCIUM SPEC-SCNC: 8.8 MG/DL (ref 8.6–10.5)
CHLORIDE SERPL-SCNC: 103 MMOL/L (ref 98–107)
CLARITY UR: CLEAR
CO2 SERPL-SCNC: 28.6 MMOL/L (ref 22–29)
COLOR UR: YELLOW
CREAT BLD-MCNC: 0.84 MG/DL (ref 0.57–1)
DEPRECATED RDW RBC AUTO: 44.3 FL (ref 37–54)
EOSINOPHIL # BLD AUTO: 0.2 10*3/MM3 (ref 0–0.4)
EOSINOPHIL NFR BLD AUTO: 3.1 % (ref 0.3–6.2)
ERYTHROCYTE [DISTWIDTH] IN BLOOD BY AUTOMATED COUNT: 13 % (ref 12.3–15.4)
GFR SERPL CREATININE-BSD FRML MDRD: 67 ML/MIN/1.73
GLOBULIN UR ELPH-MCNC: 2.6 GM/DL
GLUCOSE BLD-MCNC: 82 MG/DL (ref 65–99)
GLUCOSE UR STRIP-MCNC: NEGATIVE MG/DL
HCT VFR BLD AUTO: 37.5 % (ref 34–46.6)
HGB BLD-MCNC: 11.7 G/DL (ref 12–15.9)
HGB UR QL STRIP.AUTO: NEGATIVE
HYALINE CASTS UR QL AUTO: ABNORMAL /LPF
IMM GRANULOCYTES # BLD AUTO: 0.08 10*3/MM3 (ref 0–0.05)
IMM GRANULOCYTES NFR BLD AUTO: 1.2 % (ref 0–0.5)
KETONES UR QL STRIP: NEGATIVE
LEUKOCYTE ESTERASE UR QL STRIP.AUTO: ABNORMAL
LYMPHOCYTES # BLD AUTO: 2.27 10*3/MM3 (ref 0.7–3.1)
LYMPHOCYTES NFR BLD AUTO: 35 % (ref 19.6–45.3)
MCH RBC QN AUTO: 29 PG (ref 26.6–33)
MCHC RBC AUTO-ENTMCNC: 31.2 G/DL (ref 31.5–35.7)
MCV RBC AUTO: 92.8 FL (ref 79–97)
MONOCYTES # BLD AUTO: 0.52 10*3/MM3 (ref 0.1–0.9)
MONOCYTES NFR BLD AUTO: 8 % (ref 5–12)
NEUTROPHILS # BLD AUTO: 3.38 10*3/MM3 (ref 1.7–7)
NEUTROPHILS NFR BLD AUTO: 52.1 % (ref 42.7–76)
NITRITE UR QL STRIP: POSITIVE
NRBC BLD AUTO-RTO: 0 /100 WBC (ref 0–0.2)
PH UR STRIP.AUTO: 6.5 [PH] (ref 5–8)
PLATELET # BLD AUTO: 262 10*3/MM3 (ref 140–450)
PMV BLD AUTO: 8.8 FL (ref 6–12)
POTASSIUM BLD-SCNC: 4.5 MMOL/L (ref 3.5–5.2)
PROT SERPL-MCNC: 6.5 G/DL (ref 6–8.5)
PROT UR QL STRIP: NEGATIVE
RBC # BLD AUTO: 4.04 10*6/MM3 (ref 3.77–5.28)
RBC # UR: ABNORMAL /HPF
REF LAB TEST METHOD: ABNORMAL
SODIUM BLD-SCNC: 143 MMOL/L (ref 136–145)
SP GR UR STRIP: 1.01 (ref 1–1.03)
SQUAMOUS #/AREA URNS HPF: ABNORMAL /HPF
UROBILINOGEN UR QL STRIP: ABNORMAL
WBC NRBC COR # BLD: 6.49 10*3/MM3 (ref 3.4–10.8)
WBC UR QL AUTO: ABNORMAL /HPF

## 2019-04-22 PROCEDURE — 93010 ELECTROCARDIOGRAM REPORT: CPT | Performed by: INTERNAL MEDICINE

## 2019-04-22 PROCEDURE — 85025 COMPLETE CBC W/AUTO DIFF WBC: CPT | Performed by: ORTHOPAEDIC SURGERY

## 2019-04-22 PROCEDURE — 36415 COLL VENOUS BLD VENIPUNCTURE: CPT

## 2019-04-22 PROCEDURE — 93005 ELECTROCARDIOGRAM TRACING: CPT

## 2019-04-22 PROCEDURE — 81001 URINALYSIS AUTO W/SCOPE: CPT | Performed by: ORTHOPAEDIC SURGERY

## 2019-04-22 PROCEDURE — 80053 COMPREHEN METABOLIC PANEL: CPT | Performed by: ORTHOPAEDIC SURGERY

## 2019-04-22 RX ORDER — LATANOPROST 50 UG/ML
1 SOLUTION/ DROPS OPHTHALMIC NIGHTLY
COMMUNITY
Start: 2019-04-15

## 2019-04-22 NOTE — DISCHARGE INSTRUCTIONS
Take the following medications the morning of surgery with a small sip of water: ATENOLOL, OMEPRAZOLE        General Instructions:  • Do not eat solid food after midnight the night before surgery.  • You may drink clear liquids day of surgery but must stop at least one hour before your hospital arrival time.  • It is beneficial for you to have a clear drink that contains carbohydrates the day of surgery.  We suggest a 12 to 20 ounce bottle of Gatorade or Powerade for non-diabetic patients or a 12 to 20 ounce bottle of G2 or Powerade Zero for diabetic patients.     Clear liquids are liquids you can see through.  Nothing red in color.     Plain water                               Sports drinks  Sodas                                   Gelatin (Jell-O)  Fruit juices without pulp such as white grape juice and apple juice  Popsicles that contain no fruit or yogurt  Tea or coffee (no cream or milk added)  Gatorade / Powerade  G2 / Powerade Zero    • Bring any papers given to you in the doctor’s office.  • Wear clean comfortable clothes and socks.  • Do not wear contact lenses, false eyelashes or make-up.  Bring a case for your glasses.   • Remove all piercings.  Leave jewelry and any other valuables at home.  • The Pre-Admission Testing nurse will instruct you to bring medications if unable to obtain an accurate list in Pre-Admission Testing.            Preventing a Surgical Site Infection:  • For 2 to 3 days before surgery, avoid shaving with a razor because the razor can irritate skin and make it easier to develop an infection.    • Any areas of open skin can increase the risk of a post-operative wound infection by allowing bacteria to enter and travel throughout the body.  Notify your surgeon if you have any skin wounds / rashes even if it is not near the expected surgical site.  The area will need assessed to determine if surgery should be delayed until it is healed.  • The night prior to surgery sleep in a clean bed  with clean clothing.  Do not allow pets to sleep with you.  • Shower on the morning of surgery using a fresh bar of anti-bacterial soap (such as Dial) and clean washcloth.  Dry with a clean towel and dress in clean clothing.  • Ask your surgeon if you will be receiving antibiotics prior to surgery.  • Make sure you, your family, and all healthcare providers clean their hands with soap and water or an alcohol based hand  before caring for you or your wound.    Day of surgery: 4/30/2019. [facility] ARRIVAL TIME WILL CALLED DAY PRIOR TO SURGERY  Upon arrival, a Pre-op nurse and Anesthesiologist will review your health history, obtain vital signs, and answer questions you may have.  The only belongings needed at this time will be your home medications and if applicable your C-PAP/BI-PAP machine.  If you are staying overnight your family can leave the rest of your belongings in the car and bring them to your room later.  A Pre-op nurse will start an IV and you may receive medication in preparation for surgery, including something to help you relax.  Your family will be able to see you in the Pre-op area.  While you are in surgery your family should notify the waiting room  if they leave the waiting room area and provide a contact phone number.    Please be aware that surgery does come with discomfort.  We want to make every effort to control your discomfort so please discuss any uncontrolled symptoms with your nurse.   Your doctor will most likely have prescribed pain medications.      If you are going home after surgery you will receive individualized written care instructions before being discharged.  A responsible adult must drive you to and from the hospital on the day of your surgery and stay with you for 24 hours.        You have received a list of surgical assistants for your reference.  If you have any questions please call Pre-Admission Testing at [phone].  Deductibles and co-payments are  collected on the day of service. Please be prepared to pay the required co-pay, deductible or deposit on the day of service as defined by your plan.

## 2019-04-30 ENCOUNTER — HOSPITAL ENCOUNTER (OUTPATIENT)
Facility: HOSPITAL | Age: 74
Setting detail: HOSPITAL OUTPATIENT SURGERY
Discharge: HOME | End: 2019-04-30
Attending: ORTHOPAEDIC SURGERY | Admitting: ORTHOPAEDIC SURGERY

## 2019-04-30 ENCOUNTER — ANESTHESIA (OUTPATIENT)
Dept: PERIOP | Facility: HOSPITAL | Age: 74
End: 2019-04-30

## 2019-04-30 ENCOUNTER — ANESTHESIA EVENT (OUTPATIENT)
Dept: PERIOP | Facility: HOSPITAL | Age: 74
End: 2019-04-30

## 2019-04-30 VITALS
DIASTOLIC BLOOD PRESSURE: 80 MMHG | SYSTOLIC BLOOD PRESSURE: 127 MMHG | OXYGEN SATURATION: 95 % | TEMPERATURE: 97.7 F | HEART RATE: 63 BPM | RESPIRATION RATE: 16 BRPM

## 2019-04-30 PROCEDURE — 25010000002 MIDAZOLAM PER 1 MG: Performed by: ANESTHESIOLOGY

## 2019-04-30 PROCEDURE — 25010000002 DEXAMETHASONE PER 1 MG: Performed by: ANESTHESIOLOGY

## 2019-04-30 PROCEDURE — 25010000002 KETOROLAC TROMETHAMINE PER 15 MG: Performed by: NURSE ANESTHETIST, CERTIFIED REGISTERED

## 2019-04-30 PROCEDURE — 25010000002 PROPOFOL 10 MG/ML EMULSION: Performed by: NURSE ANESTHETIST, CERTIFIED REGISTERED

## 2019-04-30 PROCEDURE — 25010000002 ONDANSETRON PER 1 MG: Performed by: NURSE ANESTHETIST, CERTIFIED REGISTERED

## 2019-04-30 PROCEDURE — 25010000002 NEOSTIGMINE PER 0.5 MG: Performed by: NURSE ANESTHETIST, CERTIFIED REGISTERED

## 2019-04-30 PROCEDURE — 25010000002 FENTANYL CITRATE (PF) 100 MCG/2ML SOLUTION: Performed by: ANESTHESIOLOGY

## 2019-04-30 PROCEDURE — 25010000002 EPINEPHRINE PER 0.1 MG: Performed by: ORTHOPAEDIC SURGERY

## 2019-04-30 RX ORDER — MIDAZOLAM HYDROCHLORIDE 1 MG/ML
1 INJECTION INTRAMUSCULAR; INTRAVENOUS
Status: DISCONTINUED | OUTPATIENT
Start: 2019-04-30 | End: 2019-04-30 | Stop reason: HOSPADM

## 2019-04-30 RX ORDER — SODIUM CHLORIDE, SODIUM LACTATE, POTASSIUM CHLORIDE, CALCIUM CHLORIDE 600; 310; 30; 20 MG/100ML; MG/100ML; MG/100ML; MG/100ML
9 INJECTION, SOLUTION INTRAVENOUS CONTINUOUS
Status: DISCONTINUED | OUTPATIENT
Start: 2019-04-30 | End: 2019-04-30 | Stop reason: HOSPADM

## 2019-04-30 RX ORDER — GLYCOPYRROLATE 0.2 MG/ML
INJECTION INTRAMUSCULAR; INTRAVENOUS AS NEEDED
Status: DISCONTINUED | OUTPATIENT
Start: 2019-04-30 | End: 2019-04-30 | Stop reason: SURG

## 2019-04-30 RX ORDER — EPHEDRINE SULFATE 50 MG/ML
5 INJECTION, SOLUTION INTRAVENOUS ONCE AS NEEDED
Status: DISCONTINUED | OUTPATIENT
Start: 2019-04-30 | End: 2019-04-30 | Stop reason: HOSPADM

## 2019-04-30 RX ORDER — PROMETHAZINE HYDROCHLORIDE 25 MG/1
25 SUPPOSITORY RECTAL ONCE AS NEEDED
Status: DISCONTINUED | OUTPATIENT
Start: 2019-04-30 | End: 2019-04-30 | Stop reason: HOSPADM

## 2019-04-30 RX ORDER — LIDOCAINE HYDROCHLORIDE 20 MG/ML
INJECTION, SOLUTION INFILTRATION; PERINEURAL AS NEEDED
Status: DISCONTINUED | OUTPATIENT
Start: 2019-04-30 | End: 2019-04-30 | Stop reason: SURG

## 2019-04-30 RX ORDER — HYDROMORPHONE HYDROCHLORIDE 1 MG/ML
0.5 INJECTION, SOLUTION INTRAMUSCULAR; INTRAVENOUS; SUBCUTANEOUS
Status: DISCONTINUED | OUTPATIENT
Start: 2019-04-30 | End: 2019-04-30 | Stop reason: HOSPADM

## 2019-04-30 RX ORDER — ONDANSETRON 2 MG/ML
4 INJECTION INTRAMUSCULAR; INTRAVENOUS ONCE AS NEEDED
Status: DISCONTINUED | OUTPATIENT
Start: 2019-04-30 | End: 2019-04-30 | Stop reason: HOSPADM

## 2019-04-30 RX ORDER — ROCURONIUM BROMIDE 10 MG/ML
INJECTION, SOLUTION INTRAVENOUS AS NEEDED
Status: DISCONTINUED | OUTPATIENT
Start: 2019-04-30 | End: 2019-04-30 | Stop reason: SURG

## 2019-04-30 RX ORDER — LIDOCAINE HYDROCHLORIDE 10 MG/ML
0.5 INJECTION, SOLUTION EPIDURAL; INFILTRATION; INTRACAUDAL; PERINEURAL ONCE AS NEEDED
Status: DISCONTINUED | OUTPATIENT
Start: 2019-04-30 | End: 2019-04-30 | Stop reason: HOSPADM

## 2019-04-30 RX ORDER — SODIUM CHLORIDE, SODIUM LACTATE, POTASSIUM CHLORIDE, CALCIUM CHLORIDE 600; 310; 30; 20 MG/100ML; MG/100ML; MG/100ML; MG/100ML
100 INJECTION, SOLUTION INTRAVENOUS CONTINUOUS
Status: DISCONTINUED | OUTPATIENT
Start: 2019-04-30 | End: 2019-04-30 | Stop reason: HOSPADM

## 2019-04-30 RX ORDER — FAMOTIDINE 10 MG/ML
20 INJECTION, SOLUTION INTRAVENOUS ONCE
Status: COMPLETED | OUTPATIENT
Start: 2019-04-30 | End: 2019-04-30

## 2019-04-30 RX ORDER — PROPOFOL 10 MG/ML
VIAL (ML) INTRAVENOUS AS NEEDED
Status: DISCONTINUED | OUTPATIENT
Start: 2019-04-30 | End: 2019-04-30 | Stop reason: SURG

## 2019-04-30 RX ORDER — SODIUM CHLORIDE 0.9 % (FLUSH) 0.9 %
1-10 SYRINGE (ML) INJECTION AS NEEDED
Status: DISCONTINUED | OUTPATIENT
Start: 2019-04-30 | End: 2019-04-30 | Stop reason: HOSPADM

## 2019-04-30 RX ORDER — ONDANSETRON 2 MG/ML
INJECTION INTRAMUSCULAR; INTRAVENOUS AS NEEDED
Status: DISCONTINUED | OUTPATIENT
Start: 2019-04-30 | End: 2019-04-30 | Stop reason: SURG

## 2019-04-30 RX ORDER — LIDOCAINE HYDROCHLORIDE AND EPINEPHRINE BITARTRATE 20; .01 MG/ML; MG/ML
INJECTION, SOLUTION SUBCUTANEOUS
Status: COMPLETED | OUTPATIENT
Start: 2019-04-30 | End: 2019-04-30

## 2019-04-30 RX ORDER — FENTANYL CITRATE 50 UG/ML
50 INJECTION, SOLUTION INTRAMUSCULAR; INTRAVENOUS
Status: DISCONTINUED | OUTPATIENT
Start: 2019-04-30 | End: 2019-04-30 | Stop reason: HOSPADM

## 2019-04-30 RX ORDER — ACETAMINOPHEN 325 MG/1
650 TABLET ORAL ONCE AS NEEDED
Status: DISCONTINUED | OUTPATIENT
Start: 2019-04-30 | End: 2019-04-30 | Stop reason: HOSPADM

## 2019-04-30 RX ORDER — HYDROCODONE BITARTRATE AND ACETAMINOPHEN 7.5; 325 MG/1; MG/1
1 TABLET ORAL ONCE AS NEEDED
Status: DISCONTINUED | OUTPATIENT
Start: 2019-04-30 | End: 2019-04-30 | Stop reason: HOSPADM

## 2019-04-30 RX ORDER — KETOROLAC TROMETHAMINE 30 MG/ML
INJECTION, SOLUTION INTRAMUSCULAR; INTRAVENOUS AS NEEDED
Status: DISCONTINUED | OUTPATIENT
Start: 2019-04-30 | End: 2019-04-30 | Stop reason: SURG

## 2019-04-30 RX ORDER — HYDRALAZINE HYDROCHLORIDE 20 MG/ML
5 INJECTION INTRAMUSCULAR; INTRAVENOUS
Status: DISCONTINUED | OUTPATIENT
Start: 2019-04-30 | End: 2019-04-30 | Stop reason: HOSPADM

## 2019-04-30 RX ORDER — ALBUTEROL SULFATE 2.5 MG/3ML
2.5 SOLUTION RESPIRATORY (INHALATION) ONCE AS NEEDED
Status: DISCONTINUED | OUTPATIENT
Start: 2019-04-30 | End: 2019-04-30 | Stop reason: HOSPADM

## 2019-04-30 RX ORDER — NALOXONE HCL 0.4 MG/ML
0.2 VIAL (ML) INJECTION AS NEEDED
Status: DISCONTINUED | OUTPATIENT
Start: 2019-04-30 | End: 2019-04-30 | Stop reason: HOSPADM

## 2019-04-30 RX ORDER — DIPHENHYDRAMINE HCL 25 MG
25 CAPSULE ORAL
Status: DISCONTINUED | OUTPATIENT
Start: 2019-04-30 | End: 2019-04-30 | Stop reason: HOSPADM

## 2019-04-30 RX ORDER — ACETAMINOPHEN 650 MG/1
650 SUPPOSITORY RECTAL ONCE AS NEEDED
Status: DISCONTINUED | OUTPATIENT
Start: 2019-04-30 | End: 2019-04-30 | Stop reason: HOSPADM

## 2019-04-30 RX ORDER — PROMETHAZINE HYDROCHLORIDE 25 MG/1
25 TABLET ORAL ONCE AS NEEDED
Status: DISCONTINUED | OUTPATIENT
Start: 2019-04-30 | End: 2019-04-30 | Stop reason: HOSPADM

## 2019-04-30 RX ORDER — LABETALOL HYDROCHLORIDE 5 MG/ML
5 INJECTION, SOLUTION INTRAVENOUS
Status: DISCONTINUED | OUTPATIENT
Start: 2019-04-30 | End: 2019-04-30 | Stop reason: HOSPADM

## 2019-04-30 RX ORDER — OXYCODONE AND ACETAMINOPHEN 7.5; 325 MG/1; MG/1
1 TABLET ORAL ONCE AS NEEDED
Status: COMPLETED | OUTPATIENT
Start: 2019-04-30 | End: 2019-04-30

## 2019-04-30 RX ORDER — CLINDAMYCIN PHOSPHATE 900 MG/50ML
900 INJECTION INTRAVENOUS ONCE
Status: COMPLETED | OUTPATIENT
Start: 2019-04-30 | End: 2019-04-30

## 2019-04-30 RX ORDER — BUPIVACAINE HYDROCHLORIDE AND EPINEPHRINE 5; 5 MG/ML; UG/ML
INJECTION, SOLUTION PERINEURAL AS NEEDED
Status: DISCONTINUED | OUTPATIENT
Start: 2019-04-30 | End: 2019-04-30 | Stop reason: HOSPADM

## 2019-04-30 RX ORDER — FLUMAZENIL 0.1 MG/ML
0.2 INJECTION INTRAVENOUS AS NEEDED
Status: DISCONTINUED | OUTPATIENT
Start: 2019-04-30 | End: 2019-04-30 | Stop reason: HOSPADM

## 2019-04-30 RX ORDER — SODIUM CHLORIDE, SODIUM LACTATE, POTASSIUM CHLORIDE, AND CALCIUM CHLORIDE .6; .31; .03; .02 G/100ML; G/100ML; G/100ML; G/100ML
IRRIGANT IRRIGATION AS NEEDED
Status: DISCONTINUED | OUTPATIENT
Start: 2019-04-30 | End: 2019-04-30 | Stop reason: HOSPADM

## 2019-04-30 RX ORDER — DIPHENHYDRAMINE HYDROCHLORIDE 50 MG/ML
12.5 INJECTION INTRAMUSCULAR; INTRAVENOUS
Status: DISCONTINUED | OUTPATIENT
Start: 2019-04-30 | End: 2019-04-30 | Stop reason: HOSPADM

## 2019-04-30 RX ORDER — BUPIVACAINE HYDROCHLORIDE AND EPINEPHRINE 5; 5 MG/ML; UG/ML
INJECTION, SOLUTION EPIDURAL; INTRACAUDAL; PERINEURAL
Status: COMPLETED | OUTPATIENT
Start: 2019-04-30 | End: 2019-04-30

## 2019-04-30 RX ORDER — DEXAMETHASONE SODIUM PHOSPHATE 4 MG/ML
INJECTION, SOLUTION INTRA-ARTICULAR; INTRALESIONAL; INTRAMUSCULAR; INTRAVENOUS; SOFT TISSUE
Status: COMPLETED | OUTPATIENT
Start: 2019-04-30 | End: 2019-04-30

## 2019-04-30 RX ORDER — PROMETHAZINE HYDROCHLORIDE 25 MG/ML
12.5 INJECTION, SOLUTION INTRAMUSCULAR; INTRAVENOUS ONCE AS NEEDED
Status: DISCONTINUED | OUTPATIENT
Start: 2019-04-30 | End: 2019-04-30 | Stop reason: HOSPADM

## 2019-04-30 RX ORDER — MIDAZOLAM HYDROCHLORIDE 1 MG/ML
2 INJECTION INTRAMUSCULAR; INTRAVENOUS
Status: DISCONTINUED | OUTPATIENT
Start: 2019-04-30 | End: 2019-04-30 | Stop reason: HOSPADM

## 2019-04-30 RX ADMIN — PROPOFOL 150 MG: 10 INJECTION, EMULSION INTRAVENOUS at 07:59

## 2019-04-30 RX ADMIN — ROCURONIUM BROMIDE 40 MG: 10 INJECTION, SOLUTION INTRAVENOUS at 07:59

## 2019-04-30 RX ADMIN — BUPIVACAINE HYDROCHLORIDE AND EPINEPHRINE BITARTRATE 15 ML: 5; .0091 INJECTION, SOLUTION EPIDURAL; INTRACAUDAL; PERINEURAL at 07:03

## 2019-04-30 RX ADMIN — KETOROLAC TROMETHAMINE 30 MG: 30 INJECTION, SOLUTION INTRAMUSCULAR; INTRAVENOUS at 08:36

## 2019-04-30 RX ADMIN — CLINDAMYCIN PHOSPHATE 900 MG: 900 INJECTION INTRAVENOUS at 07:52

## 2019-04-30 RX ADMIN — DEXAMETHASONE SODIUM PHOSPHATE 4 MG: 4 INJECTION, SOLUTION INTRAMUSCULAR; INTRAVENOUS at 07:03

## 2019-04-30 RX ADMIN — ONDANSETRON 4 MG: 2 INJECTION INTRAMUSCULAR; INTRAVENOUS at 08:35

## 2019-04-30 RX ADMIN — FENTANYL CITRATE 50 MCG: 50 INJECTION, SOLUTION INTRAMUSCULAR; INTRAVENOUS at 07:06

## 2019-04-30 RX ADMIN — MIDAZOLAM 2 MG: 1 INJECTION INTRAMUSCULAR; INTRAVENOUS at 06:54

## 2019-04-30 RX ADMIN — FAMOTIDINE 20 MG: 10 INJECTION INTRAVENOUS at 06:48

## 2019-04-30 RX ADMIN — LIDOCAINE HYDROCHLORIDE AND EPINEPHRINE 10 ML: 20; 10 INJECTION, SOLUTION INFILTRATION; PERINEURAL at 07:03

## 2019-04-30 RX ADMIN — Medication 3 MG: at 08:35

## 2019-04-30 RX ADMIN — SODIUM CHLORIDE, POTASSIUM CHLORIDE, SODIUM LACTATE AND CALCIUM CHLORIDE 9 ML/HR: 600; 310; 30; 20 INJECTION, SOLUTION INTRAVENOUS at 06:39

## 2019-04-30 RX ADMIN — GLYCOPYRROLATE 0.4 MG: 0.2 INJECTION INTRAMUSCULAR; INTRAVENOUS at 08:35

## 2019-04-30 RX ADMIN — OXYCODONE HYDROCHLORIDE AND ACETAMINOPHEN 1 TABLET: 7.5; 325 TABLET ORAL at 09:32

## 2019-04-30 RX ADMIN — LIDOCAINE HYDROCHLORIDE 60 MG: 20 INJECTION, SOLUTION INFILTRATION; PERINEURAL at 07:59

## 2019-04-30 NOTE — ANESTHESIA PROCEDURE NOTES
Airway  Urgency: elective    Airway not difficult    General Information and Staff    Patient location during procedure: OR  Anesthesiologist: Simón Herzog MD  CRNA: Charisse Newton CRNA    Indications and Patient Condition  Indications for airway management: airway protection    Preoxygenated: yes  MILS not maintained throughout  Mask difficulty assessment: 1 - vent by mask    Final Airway Details  Final airway type: endotracheal airway      Successful airway: ETT  Cuffed: yes   Successful intubation technique: direct laryngoscopy  Facilitating devices/methods: intubating stylet  Endotracheal tube insertion site: oral  Blade: Eugene  Blade size: 3  ETT size (mm): 7.0  Cormack-Lehane Classification: grade I - full view of glottis  Placement verified by: chest auscultation   Cuff volume (mL): 8  Measured from: lips  ETT to lips (cm): 20  Number of attempts at approach: 1    Additional Comments  PreO2 100% face mask, IV induction, easy mask, DVL x1, cords noted, tube through, cuff up, EBBSH, +etCO2, = chest movement, tube secured in place, atraumatic, teeth and lips intact as preop.

## 2019-04-30 NOTE — ANESTHESIA POSTPROCEDURE EVALUATION
Patient: Tish Urias    Procedure Summary     Date:  04/30/19 Room / Location:   [facility] [address] [place] [address] [facility]    Anesthesia Start:  0751 Anesthesia Stop:  0851    Procedure:  LEFT SHOULDER ARTHROSCOPY, BICEPS TENOTOMY, DEBRIDEMENT OF ROTATOR CUFF AND LABRUM (Left Shoulder) Diagnosis:      Surgeon:  Yoseph Galvan MD Provider:  Simón Herzog MD    Anesthesia Type:  general with block ASA Status:  3          Anesthesia Type: general with block  Last vitals  BP   126/74 (04/30/19 0930)   Temp   36.5 °C (97.7 °F) (04/30/19 0846)   Pulse   63 (04/30/19 0930)   Resp   16 (04/30/19 0930)     SpO2   97 % (04/30/19 0930)     Post Anesthesia Care and Evaluation    Patient location during evaluation: bedside  Patient participation: complete - patient participated  Level of consciousness: awake  Pain score: 2  Pain management: adequate  Airway patency: patent  Anesthetic complications: No anesthetic complications    Cardiovascular status: acceptable  Respiratory status: acceptable  Hydration status: acceptable    Comments: /74   Pulse 63   Temp 36.5 °C (97.7 °F) (Oral)   Resp 16   SpO2 97%

## 2019-04-30 NOTE — ANESTHESIA PREPROCEDURE EVALUATION
Anesthesia Evaluation     Patient summary reviewed and Nursing notes reviewed   NPO Solid Status: > 8 hours  NPO Liquid Status: > 2 hours           Airway   Dental      Pulmonary    Cardiovascular     ECG reviewed    (+) hypertension,       Neuro/Psych  GI/Hepatic/Renal/Endo    (+)  GERD,  hypothyroidism,     Musculoskeletal     Abdominal    Substance History      OB/GYN          Other                        Anesthesia Plan    ASA 3     general with block     intravenous induction   Anesthetic plan, all risks, benefits, and alternatives have been provided, discussed and informed consent has been obtained with: patient.

## 2019-04-30 NOTE — ANESTHESIA PROCEDURE NOTES
Peripheral Block    Pre-sedation assessment completed: 4/30/2019 7:03 AM    Patient reassessed immediately prior to procedure    Patient location during procedure: pre-op  Start time: 4/30/2019 7:03 AM  Stop time: 4/30/2019 7:12 AM  Reason for block: at surgeon's request and post-op pain management  Performed by  Anesthesiologist: Simón Herzog MD  Preanesthetic Checklist  Completed: patient identified, site marked, surgical consent, pre-op evaluation, timeout performed, IV checked, risks and benefits discussed and monitors and equipment checked  Prep:  Pt Position: sitting  Sterile barriers:cap, gloves, mask and sterile barriers  Prep: ChloraPrep  Patient monitoring: blood pressure monitoring, continuous pulse oximetry and EKG  Procedure  Sedation:yes  Performed under: MAC  Guidance:ultrasound guided  ULTRASOUND INTERPRETATION. Using ultrasound guidance a 20 G gauge needle was placed in close proximity to the nerve, at which point, under ultrasound guidance anesthetic was injected in the area of the nerve and spread of the anesthesia was seen on ultrasound in close proximity thereto.  There were no abnormalities seen on ultrasound; a digital image was taken; and the patient tolerated the procedure with no complications. Images:still images obtained    Laterality:left  Block Type:interscalene  Injection Technique:single-shot  Needle Type:echogenic  Needle Gauge:20 G  Resistance on Injection: none  Medications Used: dexamethasone (DECADRON) injection, 4 mg  bupivacaine-EPINEPHrine PF (MARCAINE w/EPI) 0.5% -1:702213 injection, 15 mL  lidocaine-EPINEPHrine (XYLOCAINE W/EPI) 2 %-1:462698 injection, 10 mL  Post Assessment  Injection Assessment: negative aspiration for heme, no paresthesia on injection and incremental injection  Patient Tolerance:comfortable throughout block  Complications:no

## 2019-05-21 ENCOUNTER — HOSPITAL ENCOUNTER (OUTPATIENT)
Dept: GENERAL RADIOLOGY | Facility: HOSPITAL | Age: 74
Discharge: HOME | End: 2019-05-21
Admitting: ORTHOPAEDIC SURGERY

## 2019-05-21 ENCOUNTER — HOSPITAL ENCOUNTER (OUTPATIENT)
Dept: GENERAL RADIOLOGY | Facility: HOSPITAL | Age: 74
Discharge: HOME | End: 2019-05-21

## 2019-05-21 ENCOUNTER — APPOINTMENT (OUTPATIENT)
Dept: PREADMISSION TESTING | Facility: HOSPITAL | Age: 74
End: 2019-05-21

## 2019-05-21 VITALS
WEIGHT: 155 LBS | OXYGEN SATURATION: 100 % | BODY MASS INDEX: 27.46 KG/M2 | SYSTOLIC BLOOD PRESSURE: 165 MMHG | DIASTOLIC BLOOD PRESSURE: 85 MMHG | RESPIRATION RATE: 20 BRPM | HEART RATE: 73 BPM | HEIGHT: 63 IN | TEMPERATURE: 97.9 F

## 2019-05-21 LAB
ALBUMIN SERPL-MCNC: 4.8 G/DL (ref 3.5–5.2)
ALBUMIN/GLOB SERPL: 1.9 G/DL
ALP SERPL-CCNC: 101 U/L (ref 39–117)
ALT SERPL W P-5'-P-CCNC: 16 U/L (ref 1–33)
ANION GAP SERPL CALCULATED.3IONS-SCNC: 16.9 MMOL/L
AST SERPL-CCNC: 21 U/L (ref 1–32)
BACTERIA UR QL AUTO: NORMAL /HPF
BILIRUB SERPL-MCNC: 0.3 MG/DL (ref 0.2–1.2)
BILIRUB UR QL STRIP: NEGATIVE
BUN BLD-MCNC: 16 MG/DL (ref 8–23)
BUN/CREAT SERPL: 16 (ref 7–25)
CALCIUM SPEC-SCNC: 9.5 MG/DL (ref 8.6–10.5)
CHLORIDE SERPL-SCNC: 100 MMOL/L (ref 98–107)
CLARITY UR: CLEAR
CO2 SERPL-SCNC: 23.1 MMOL/L (ref 22–29)
COLOR UR: YELLOW
CREAT BLD-MCNC: 1 MG/DL (ref 0.57–1)
DEPRECATED RDW RBC AUTO: 43.9 FL (ref 37–54)
ERYTHROCYTE [DISTWIDTH] IN BLOOD BY AUTOMATED COUNT: 13.3 % (ref 12.3–15.4)
GFR SERPL CREATININE-BSD FRML MDRD: 55 ML/MIN/1.73
GLOBULIN UR ELPH-MCNC: 2.5 GM/DL
GLUCOSE BLD-MCNC: 92 MG/DL (ref 65–99)
GLUCOSE UR STRIP-MCNC: NEGATIVE MG/DL
HCT VFR BLD AUTO: 38.9 % (ref 34–46.6)
HGB BLD-MCNC: 12.7 G/DL (ref 12–15.9)
HGB UR QL STRIP.AUTO: NEGATIVE
HYALINE CASTS UR QL AUTO: NORMAL /LPF
KETONES UR QL STRIP: NEGATIVE
LEUKOCYTE ESTERASE UR QL STRIP.AUTO: NEGATIVE
MCH RBC QN AUTO: 29.5 PG (ref 26.6–33)
MCHC RBC AUTO-ENTMCNC: 32.6 G/DL (ref 31.5–35.7)
MCV RBC AUTO: 90.5 FL (ref 79–97)
NITRITE UR QL STRIP: NEGATIVE
PH UR STRIP.AUTO: 5.5 [PH] (ref 5–8)
PLATELET # BLD AUTO: 257 10*3/MM3 (ref 140–450)
PMV BLD AUTO: 9.1 FL (ref 6–12)
POTASSIUM BLD-SCNC: 4 MMOL/L (ref 3.5–5.2)
PROT SERPL-MCNC: 7.3 G/DL (ref 6–8.5)
PROT UR QL STRIP: NEGATIVE
RBC # BLD AUTO: 4.3 10*6/MM3 (ref 3.77–5.28)
RBC # UR: NORMAL /HPF
REF LAB TEST METHOD: NORMAL
SODIUM BLD-SCNC: 140 MMOL/L (ref 136–145)
SP GR UR STRIP: 1.01 (ref 1–1.03)
SQUAMOUS #/AREA URNS HPF: NORMAL /HPF
UROBILINOGEN UR QL STRIP: NORMAL
WBC NRBC COR # BLD: 7.27 10*3/MM3 (ref 3.4–10.8)
WBC UR QL AUTO: NORMAL /HPF

## 2019-05-21 PROCEDURE — 81001 URINALYSIS AUTO W/SCOPE: CPT | Performed by: ORTHOPAEDIC SURGERY

## 2019-05-21 PROCEDURE — 36415 COLL VENOUS BLD VENIPUNCTURE: CPT

## 2019-05-21 PROCEDURE — 63710000001 MUPIROCIN 2 % OINTMENT: Performed by: ORTHOPAEDIC SURGERY

## 2019-05-21 PROCEDURE — 85027 COMPLETE CBC AUTOMATED: CPT | Performed by: ORTHOPAEDIC SURGERY

## 2019-05-21 PROCEDURE — 80053 COMPREHEN METABOLIC PANEL: CPT | Performed by: ORTHOPAEDIC SURGERY

## 2019-05-21 PROCEDURE — 73030 X-RAY EXAM OF SHOULDER: CPT

## 2019-05-21 PROCEDURE — A9270 NON-COVERED ITEM OR SERVICE: HCPCS | Performed by: ORTHOPAEDIC SURGERY

## 2019-05-21 PROCEDURE — 71046 X-RAY EXAM CHEST 2 VIEWS: CPT

## 2019-05-21 RX ORDER — COVID-19 ANTIGEN TEST
2 KIT MISCELLANEOUS AS NEEDED
COMMUNITY
End: 2019-05-29 | Stop reason: HOSPADM

## 2019-05-21 RX ORDER — CHLORHEXIDINE GLUCONATE 500 MG/1
1 CLOTH TOPICAL
Status: ON HOLD | COMMUNITY
End: 2019-05-28

## 2019-05-28 ENCOUNTER — ANESTHESIA (OUTPATIENT)
Dept: PERIOP | Facility: HOSPITAL | Age: 74
End: 2019-05-28

## 2019-05-28 ENCOUNTER — APPOINTMENT (OUTPATIENT)
Dept: GENERAL RADIOLOGY | Facility: HOSPITAL | Age: 74
End: 2019-05-28

## 2019-05-28 ENCOUNTER — HOSPITAL ENCOUNTER (INPATIENT)
Facility: HOSPITAL | Age: 74
LOS: 1 days | Discharge: HOME | End: 2019-05-29
Attending: ORTHOPAEDIC SURGERY | Admitting: ORTHOPAEDIC SURGERY

## 2019-05-28 ENCOUNTER — ANESTHESIA EVENT (OUTPATIENT)
Dept: PERIOP | Facility: HOSPITAL | Age: 74
End: 2019-05-28

## 2019-05-28 PROBLEM — Z96.612 S/P REVERSE TOTAL SHOULDER ARTHROPLASTY, LEFT: Status: ACTIVE | Noted: 2019-05-28

## 2019-05-28 LAB
CREAT BLD-MCNC: 0.9 MG/DL (ref 0.57–1)
GFR SERPL CREATININE-BSD FRML MDRD: 62 ML/MIN/1.73

## 2019-05-28 PROCEDURE — 25010000002 ROPIVACAINE PER 1 MG: Performed by: ANESTHESIOLOGY

## 2019-05-28 PROCEDURE — 25010000002 VANCOMYCIN PER 500 MG: Performed by: ORTHOPAEDIC SURGERY

## 2019-05-28 PROCEDURE — C1776 JOINT DEVICE (IMPLANTABLE): HCPCS | Performed by: ORTHOPAEDIC SURGERY

## 2019-05-28 PROCEDURE — 25010000003 BUPIVACAINE LIPOSOME 1.3 % SUSPENSION 10 ML VIAL: Performed by: ORTHOPAEDIC SURGERY

## 2019-05-28 PROCEDURE — 25010000002 VANCOMYCIN 750 MG RECONSTITUTED SOLUTION: Performed by: ORTHOPAEDIC SURGERY

## 2019-05-28 PROCEDURE — 25010000002 FENTANYL CITRATE (PF) 100 MCG/2ML SOLUTION: Performed by: NURSE ANESTHETIST, CERTIFIED REGISTERED

## 2019-05-28 PROCEDURE — 0RRK00Z REPLACEMENT OF LEFT SHOULDER JOINT WITH REVERSE BALL AND SOCKET SYNTHETIC SUBSTITUTE, OPEN APPROACH: ICD-10-PCS | Performed by: ORTHOPAEDIC SURGERY

## 2019-05-28 PROCEDURE — 25010000002 ONDANSETRON PER 1 MG: Performed by: NURSE ANESTHETIST, CERTIFIED REGISTERED

## 2019-05-28 PROCEDURE — C1713 ANCHOR/SCREW BN/BN,TIS/BN: HCPCS | Performed by: ORTHOPAEDIC SURGERY

## 2019-05-28 PROCEDURE — 73030 X-RAY EXAM OF SHOULDER: CPT

## 2019-05-28 PROCEDURE — 25010000002 PROPOFOL 10 MG/ML EMULSION: Performed by: NURSE ANESTHETIST, CERTIFIED REGISTERED

## 2019-05-28 PROCEDURE — C9290 INJ, BUPIVACAINE LIPOSOME: HCPCS | Performed by: ORTHOPAEDIC SURGERY

## 2019-05-28 PROCEDURE — 25010000002 ONDANSETRON PER 1 MG: Performed by: ORTHOPAEDIC SURGERY

## 2019-05-28 PROCEDURE — 82565 ASSAY OF CREATININE: CPT | Performed by: ORTHOPAEDIC SURGERY

## 2019-05-28 PROCEDURE — 25010000002 DEXAMETHASONE PER 1 MG: Performed by: NURSE ANESTHETIST, CERTIFIED REGISTERED

## 2019-05-28 PROCEDURE — 25010000002 MIDAZOLAM PER 1 MG: Performed by: ANESTHESIOLOGY

## 2019-05-28 PROCEDURE — 25010000002 FENTANYL CITRATE (PF) 100 MCG/2ML SOLUTION: Performed by: ANESTHESIOLOGY

## 2019-05-28 DEVICE — GLENSPHR TRABECULARMETAL REV 36MM: Type: IMPLANTABLE DEVICE | Site: SHOULDER | Status: FUNCTIONAL

## 2019-05-28 DEVICE — BASEPLT GLEN TRABECULARMETAL REV 15MM: Type: IMPLANTABLE DEVICE | Site: SHOULDER | Status: FUNCTIONAL

## 2019-05-28 DEVICE — IMPLANTABLE DEVICE: Type: IMPLANTABLE DEVICE | Site: SHOULDER | Status: FUNCTIONAL

## 2019-05-28 DEVICE — KT SHLDR DRL PIN SPG: Type: IMPLANTABLE DEVICE | Site: SHOULDER | Status: FUNCTIONAL

## 2019-05-28 DEVICE — LINER HUM/SHLDR TRABECULARMETAL REV POLY 60DEG 36MM PLS3: Type: IMPLANTABLE DEVICE | Site: SHOULDER | Status: FUNCTIONAL

## 2019-05-28 DEVICE — STEM HUM/SHLDR TRABECULARMETAL REV 14X130MM: Type: IMPLANTABLE DEVICE | Site: SHOULDER | Status: FUNCTIONAL

## 2019-05-28 DEVICE — INVERSE/REVERSE SCREW SYSTEM, 4.5-36
Type: IMPLANTABLE DEVICE | Site: SHOULDER | Status: FUNCTIONAL
Brand: INVERSE/REVERSE

## 2019-05-28 RX ORDER — ATENOLOL 50 MG/1
50 TABLET ORAL EVERY MORNING
Status: DISCONTINUED | OUTPATIENT
Start: 2019-05-29 | End: 2019-05-29 | Stop reason: HOSPADM

## 2019-05-28 RX ORDER — DIAZEPAM 5 MG/1
5 TABLET ORAL EVERY 6 HOURS PRN
Status: DISCONTINUED | OUTPATIENT
Start: 2019-05-28 | End: 2019-05-29 | Stop reason: HOSPADM

## 2019-05-28 RX ORDER — WOUND DRESSING ADHESIVE - LIQUID
LIQUID MISCELLANEOUS AS NEEDED
Status: DISCONTINUED | OUTPATIENT
Start: 2019-05-28 | End: 2019-05-28 | Stop reason: HOSPADM

## 2019-05-28 RX ORDER — FLUMAZENIL 0.1 MG/ML
0.2 INJECTION INTRAVENOUS AS NEEDED
Status: DISCONTINUED | OUTPATIENT
Start: 2019-05-28 | End: 2019-05-28 | Stop reason: HOSPADM

## 2019-05-28 RX ORDER — FENTANYL CITRATE 50 UG/ML
50 INJECTION, SOLUTION INTRAMUSCULAR; INTRAVENOUS
Status: DISCONTINUED | OUTPATIENT
Start: 2019-05-28 | End: 2019-05-28 | Stop reason: HOSPADM

## 2019-05-28 RX ORDER — FENTANYL CITRATE 50 UG/ML
25 INJECTION, SOLUTION INTRAMUSCULAR; INTRAVENOUS
Status: DISCONTINUED | OUTPATIENT
Start: 2019-05-28 | End: 2019-05-28 | Stop reason: HOSPADM

## 2019-05-28 RX ORDER — PROMETHAZINE HYDROCHLORIDE 25 MG/1
25 TABLET ORAL ONCE AS NEEDED
Status: DISCONTINUED | OUTPATIENT
Start: 2019-05-28 | End: 2019-05-28 | Stop reason: HOSPADM

## 2019-05-28 RX ORDER — MELOXICAM 15 MG/1
15 TABLET ORAL DAILY
Status: DISCONTINUED | OUTPATIENT
Start: 2019-05-28 | End: 2019-05-29 | Stop reason: HOSPADM

## 2019-05-28 RX ORDER — DIPHENHYDRAMINE HCL 25 MG
25 CAPSULE ORAL
Status: DISCONTINUED | OUTPATIENT
Start: 2019-05-28 | End: 2019-05-28 | Stop reason: HOSPADM

## 2019-05-28 RX ORDER — MELATONIN
1000 DAILY
Status: DISCONTINUED | OUTPATIENT
Start: 2019-05-28 | End: 2019-05-29 | Stop reason: HOSPADM

## 2019-05-28 RX ORDER — OXYCODONE AND ACETAMINOPHEN 7.5; 325 MG/1; MG/1
1 TABLET ORAL ONCE AS NEEDED
Status: DISCONTINUED | OUTPATIENT
Start: 2019-05-28 | End: 2019-05-28 | Stop reason: HOSPADM

## 2019-05-28 RX ORDER — PROMETHAZINE HYDROCHLORIDE 25 MG/ML
6.25 INJECTION, SOLUTION INTRAMUSCULAR; INTRAVENOUS ONCE AS NEEDED
Status: DISCONTINUED | OUTPATIENT
Start: 2019-05-28 | End: 2019-05-28 | Stop reason: HOSPADM

## 2019-05-28 RX ORDER — ACETAMINOPHEN 650 MG/1
650 SUPPOSITORY RECTAL ONCE AS NEEDED
Status: DISCONTINUED | OUTPATIENT
Start: 2019-05-28 | End: 2019-05-28 | Stop reason: HOSPADM

## 2019-05-28 RX ORDER — ONDANSETRON 2 MG/ML
4 INJECTION INTRAMUSCULAR; INTRAVENOUS ONCE AS NEEDED
Status: DISCONTINUED | OUTPATIENT
Start: 2019-05-28 | End: 2019-05-28 | Stop reason: HOSPADM

## 2019-05-28 RX ORDER — PROMETHAZINE HYDROCHLORIDE 25 MG/1
25 SUPPOSITORY RECTAL ONCE AS NEEDED
Status: DISCONTINUED | OUTPATIENT
Start: 2019-05-28 | End: 2019-05-28 | Stop reason: HOSPADM

## 2019-05-28 RX ORDER — NALOXONE HCL 0.4 MG/ML
0.4 VIAL (ML) INJECTION
Status: DISCONTINUED | OUTPATIENT
Start: 2019-05-28 | End: 2019-05-29 | Stop reason: HOSPADM

## 2019-05-28 RX ORDER — PROMETHAZINE HYDROCHLORIDE 25 MG/ML
6.25 INJECTION, SOLUTION INTRAMUSCULAR; INTRAVENOUS
Status: DISCONTINUED | OUTPATIENT
Start: 2019-05-28 | End: 2019-05-28 | Stop reason: HOSPADM

## 2019-05-28 RX ORDER — MIDAZOLAM HYDROCHLORIDE 1 MG/ML
1 INJECTION INTRAMUSCULAR; INTRAVENOUS
Status: DISCONTINUED | OUTPATIENT
Start: 2019-05-28 | End: 2019-05-28 | Stop reason: HOSPADM

## 2019-05-28 RX ORDER — FENTANYL CITRATE 50 UG/ML
INJECTION, SOLUTION INTRAMUSCULAR; INTRAVENOUS AS NEEDED
Status: DISCONTINUED | OUTPATIENT
Start: 2019-05-28 | End: 2019-05-28 | Stop reason: SURG

## 2019-05-28 RX ORDER — MORPHINE SULFATE 2 MG/ML
2 INJECTION, SOLUTION INTRAMUSCULAR; INTRAVENOUS EVERY 4 HOURS PRN
Status: DISCONTINUED | OUTPATIENT
Start: 2019-05-28 | End: 2019-05-29 | Stop reason: HOSPADM

## 2019-05-28 RX ORDER — LIDOCAINE HYDROCHLORIDE 10 MG/ML
0.5 INJECTION, SOLUTION EPIDURAL; INFILTRATION; INTRACAUDAL; PERINEURAL ONCE AS NEEDED
Status: DISCONTINUED | OUTPATIENT
Start: 2019-05-28 | End: 2019-05-28 | Stop reason: HOSPADM

## 2019-05-28 RX ORDER — OMEPRAZOLE 20 MG/1
20 CAPSULE, DELAYED RELEASE ORAL DAILY
Status: DISCONTINUED | OUTPATIENT
Start: 2019-05-29 | End: 2019-05-28 | Stop reason: CLARIF

## 2019-05-28 RX ORDER — LEVOTHYROXINE SODIUM 88 UG/1
88 TABLET ORAL
Status: DISCONTINUED | OUTPATIENT
Start: 2019-05-28 | End: 2019-05-29 | Stop reason: HOSPADM

## 2019-05-28 RX ORDER — ACETAMINOPHEN 325 MG/1
650 TABLET ORAL ONCE AS NEEDED
Status: DISCONTINUED | OUTPATIENT
Start: 2019-05-28 | End: 2019-05-28 | Stop reason: HOSPADM

## 2019-05-28 RX ORDER — MIDAZOLAM HYDROCHLORIDE 1 MG/ML
2 INJECTION INTRAMUSCULAR; INTRAVENOUS
Status: DISCONTINUED | OUTPATIENT
Start: 2019-05-28 | End: 2019-05-28 | Stop reason: HOSPADM

## 2019-05-28 RX ORDER — LATANOPROST 50 UG/ML
1 SOLUTION/ DROPS OPHTHALMIC NIGHTLY
Status: DISCONTINUED | OUTPATIENT
Start: 2019-05-28 | End: 2019-05-29 | Stop reason: HOSPADM

## 2019-05-28 RX ORDER — HYDROMORPHONE HYDROCHLORIDE 1 MG/ML
0.25 INJECTION, SOLUTION INTRAMUSCULAR; INTRAVENOUS; SUBCUTANEOUS
Status: DISCONTINUED | OUTPATIENT
Start: 2019-05-28 | End: 2019-05-28 | Stop reason: HOSPADM

## 2019-05-28 RX ORDER — ENALAPRILAT 2.5 MG/2ML
0.62 INJECTION INTRAVENOUS ONCE AS NEEDED
Status: DISCONTINUED | OUTPATIENT
Start: 2019-05-28 | End: 2019-05-28 | Stop reason: HOSPADM

## 2019-05-28 RX ORDER — ROCURONIUM BROMIDE 10 MG/ML
INJECTION, SOLUTION INTRAVENOUS AS NEEDED
Status: DISCONTINUED | OUTPATIENT
Start: 2019-05-28 | End: 2019-05-28 | Stop reason: SURG

## 2019-05-28 RX ORDER — DIPHENHYDRAMINE HYDROCHLORIDE 50 MG/ML
12.5 INJECTION INTRAMUSCULAR; INTRAVENOUS
Status: DISCONTINUED | OUTPATIENT
Start: 2019-05-28 | End: 2019-05-28 | Stop reason: HOSPADM

## 2019-05-28 RX ORDER — OXYCODONE AND ACETAMINOPHEN 10; 325 MG/1; MG/1
1 TABLET ORAL EVERY 4 HOURS PRN
Status: DISCONTINUED | OUTPATIENT
Start: 2019-05-28 | End: 2019-05-29 | Stop reason: HOSPADM

## 2019-05-28 RX ORDER — PROMETHAZINE HYDROCHLORIDE 25 MG/ML
12.5 INJECTION, SOLUTION INTRAMUSCULAR; INTRAVENOUS EVERY 4 HOURS PRN
Status: DISCONTINUED | OUTPATIENT
Start: 2019-05-28 | End: 2019-05-29 | Stop reason: HOSPADM

## 2019-05-28 RX ORDER — VANCOMYCIN HYDROCHLORIDE 1 G/200ML
1000 INJECTION, SOLUTION INTRAVENOUS EVERY 12 HOURS
Status: DISCONTINUED | OUTPATIENT
Start: 2019-05-28 | End: 2019-05-28

## 2019-05-28 RX ORDER — ONDANSETRON 2 MG/ML
INJECTION INTRAMUSCULAR; INTRAVENOUS AS NEEDED
Status: DISCONTINUED | OUTPATIENT
Start: 2019-05-28 | End: 2019-05-28 | Stop reason: SURG

## 2019-05-28 RX ORDER — HYDROMORPHONE HYDROCHLORIDE 1 MG/ML
0.5 INJECTION, SOLUTION INTRAMUSCULAR; INTRAVENOUS; SUBCUTANEOUS
Status: DISCONTINUED | OUTPATIENT
Start: 2019-05-28 | End: 2019-05-28 | Stop reason: HOSPADM

## 2019-05-28 RX ORDER — SODIUM CHLORIDE, SODIUM LACTATE, POTASSIUM CHLORIDE, CALCIUM CHLORIDE 600; 310; 30; 20 MG/100ML; MG/100ML; MG/100ML; MG/100ML
9 INJECTION, SOLUTION INTRAVENOUS CONTINUOUS
Status: DISCONTINUED | OUTPATIENT
Start: 2019-05-28 | End: 2019-05-29 | Stop reason: HOSPADM

## 2019-05-28 RX ORDER — ONDANSETRON 2 MG/ML
4 INJECTION INTRAMUSCULAR; INTRAVENOUS EVERY 6 HOURS PRN
Status: DISCONTINUED | OUTPATIENT
Start: 2019-05-28 | End: 2019-05-29 | Stop reason: HOSPADM

## 2019-05-28 RX ORDER — HYDROCODONE BITARTRATE AND ACETAMINOPHEN 7.5; 325 MG/1; MG/1
1 TABLET ORAL ONCE AS NEEDED
Status: DISCONTINUED | OUTPATIENT
Start: 2019-05-28 | End: 2019-05-28 | Stop reason: HOSPADM

## 2019-05-28 RX ORDER — PROMETHAZINE HYDROCHLORIDE 25 MG/ML
12.5 INJECTION, SOLUTION INTRAMUSCULAR; INTRAVENOUS ONCE AS NEEDED
Status: DISCONTINUED | OUTPATIENT
Start: 2019-05-28 | End: 2019-05-28 | Stop reason: HOSPADM

## 2019-05-28 RX ORDER — CARBAMAZEPINE 100 MG/1
100 CAPSULE, EXTENDED RELEASE ORAL DAILY
Status: DISCONTINUED | OUTPATIENT
Start: 2019-05-28 | End: 2019-05-29 | Stop reason: HOSPADM

## 2019-05-28 RX ORDER — DIPHENHYDRAMINE HCL 25 MG
25 CAPSULE ORAL EVERY 6 HOURS PRN
Status: DISCONTINUED | OUTPATIENT
Start: 2019-05-28 | End: 2019-05-29 | Stop reason: HOSPADM

## 2019-05-28 RX ORDER — PANTOPRAZOLE SODIUM 40 MG/1
40 TABLET, DELAYED RELEASE ORAL
Status: DISCONTINUED | OUTPATIENT
Start: 2019-05-29 | End: 2019-05-29 | Stop reason: HOSPADM

## 2019-05-28 RX ORDER — LOTEPREDNOL ETABONATE 5 MG/G
1 GEL OPHTHALMIC 4 TIMES DAILY PRN
Status: DISCONTINUED | OUTPATIENT
Start: 2019-05-28 | End: 2019-05-29 | Stop reason: HOSPADM

## 2019-05-28 RX ORDER — MAGNESIUM HYDROXIDE 1200 MG/15ML
LIQUID ORAL AS NEEDED
Status: DISCONTINUED | OUTPATIENT
Start: 2019-05-28 | End: 2019-05-28 | Stop reason: HOSPADM

## 2019-05-28 RX ORDER — PROPOFOL 10 MG/ML
VIAL (ML) INTRAVENOUS AS NEEDED
Status: DISCONTINUED | OUTPATIENT
Start: 2019-05-28 | End: 2019-05-28 | Stop reason: SURG

## 2019-05-28 RX ORDER — EPHEDRINE SULFATE 50 MG/ML
INJECTION, SOLUTION INTRAVENOUS AS NEEDED
Status: DISCONTINUED | OUTPATIENT
Start: 2019-05-28 | End: 2019-05-28 | Stop reason: SURG

## 2019-05-28 RX ORDER — LABETALOL HYDROCHLORIDE 5 MG/ML
5 INJECTION, SOLUTION INTRAVENOUS
Status: DISCONTINUED | OUTPATIENT
Start: 2019-05-28 | End: 2019-05-28 | Stop reason: HOSPADM

## 2019-05-28 RX ORDER — MONTELUKAST SODIUM 10 MG/1
10 TABLET ORAL NIGHTLY
Status: DISCONTINUED | OUTPATIENT
Start: 2019-05-28 | End: 2019-05-29 | Stop reason: HOSPADM

## 2019-05-28 RX ORDER — ONDANSETRON 4 MG/1
4 TABLET, FILM COATED ORAL EVERY 6 HOURS PRN
Status: DISCONTINUED | OUTPATIENT
Start: 2019-05-28 | End: 2019-05-29 | Stop reason: HOSPADM

## 2019-05-28 RX ORDER — LIDOCAINE HYDROCHLORIDE 20 MG/ML
INJECTION, SOLUTION INFILTRATION; PERINEURAL AS NEEDED
Status: DISCONTINUED | OUTPATIENT
Start: 2019-05-28 | End: 2019-05-28 | Stop reason: SURG

## 2019-05-28 RX ORDER — ZOLPIDEM TARTRATE 5 MG/1
5 TABLET ORAL NIGHTLY PRN
Status: DISCONTINUED | OUTPATIENT
Start: 2019-05-28 | End: 2019-05-29 | Stop reason: HOSPADM

## 2019-05-28 RX ORDER — DEXAMETHASONE SODIUM PHOSPHATE 10 MG/ML
INJECTION INTRAMUSCULAR; INTRAVENOUS AS NEEDED
Status: DISCONTINUED | OUTPATIENT
Start: 2019-05-28 | End: 2019-05-28 | Stop reason: SURG

## 2019-05-28 RX ORDER — HYDRALAZINE HYDROCHLORIDE 20 MG/ML
5 INJECTION INTRAMUSCULAR; INTRAVENOUS
Status: DISCONTINUED | OUTPATIENT
Start: 2019-05-28 | End: 2019-05-28 | Stop reason: HOSPADM

## 2019-05-28 RX ORDER — NALOXONE HCL 0.4 MG/ML
0.2 VIAL (ML) INJECTION AS NEEDED
Status: DISCONTINUED | OUTPATIENT
Start: 2019-05-28 | End: 2019-05-28 | Stop reason: HOSPADM

## 2019-05-28 RX ORDER — FAMOTIDINE 10 MG/ML
20 INJECTION, SOLUTION INTRAVENOUS ONCE
Status: COMPLETED | OUTPATIENT
Start: 2019-05-28 | End: 2019-05-28

## 2019-05-28 RX ORDER — VANCOMYCIN HYDROCHLORIDE 1 G/200ML
15 INJECTION, SOLUTION INTRAVENOUS ONCE
Status: COMPLETED | OUTPATIENT
Start: 2019-05-28 | End: 2019-05-28

## 2019-05-28 RX ORDER — EPHEDRINE SULFATE 50 MG/ML
5 INJECTION, SOLUTION INTRAVENOUS ONCE AS NEEDED
Status: DISCONTINUED | OUTPATIENT
Start: 2019-05-28 | End: 2019-05-28 | Stop reason: HOSPADM

## 2019-05-28 RX ORDER — ROPIVACAINE HYDROCHLORIDE 5 MG/ML
INJECTION, SOLUTION EPIDURAL; INFILTRATION; PERINEURAL
Status: COMPLETED | OUTPATIENT
Start: 2019-05-28 | End: 2019-05-28

## 2019-05-28 RX ORDER — SODIUM CHLORIDE 0.9 % (FLUSH) 0.9 %
1-10 SYRINGE (ML) INJECTION AS NEEDED
Status: DISCONTINUED | OUTPATIENT
Start: 2019-05-28 | End: 2019-05-28 | Stop reason: HOSPADM

## 2019-05-28 RX ADMIN — OXYCODONE HYDROCHLORIDE AND ACETAMINOPHEN 1 TABLET: 10; 325 TABLET ORAL at 10:16

## 2019-05-28 RX ADMIN — SODIUM CHLORIDE 750 MG: 900 INJECTION, SOLUTION INTRAVENOUS at 18:28

## 2019-05-28 RX ADMIN — FAMOTIDINE 20 MG: 10 INJECTION INTRAVENOUS at 06:15

## 2019-05-28 RX ADMIN — VANCOMYCIN HYDROCHLORIDE 1000 MG: 1 INJECTION, SOLUTION INTRAVENOUS at 06:24

## 2019-05-28 RX ADMIN — MONTELUKAST SODIUM 10 MG: 10 TABLET, FILM COATED ORAL at 20:37

## 2019-05-28 RX ADMIN — FENTANYL CITRATE 50 MCG: 50 INJECTION INTRAMUSCULAR; INTRAVENOUS at 06:18

## 2019-05-28 RX ADMIN — FENTANYL CITRATE 50 MCG: 50 INJECTION INTRAMUSCULAR; INTRAVENOUS at 06:51

## 2019-05-28 RX ADMIN — MIDAZOLAM 2 MG: 1 INJECTION INTRAMUSCULAR; INTRAVENOUS at 06:17

## 2019-05-28 RX ADMIN — PROPOFOL 150 MG: 10 INJECTION, EMULSION INTRAVENOUS at 06:51

## 2019-05-28 RX ADMIN — ONDANSETRON HYDROCHLORIDE 4 MG: 2 SOLUTION INTRAMUSCULAR; INTRAVENOUS at 12:39

## 2019-05-28 RX ADMIN — LATANOPROST 1 DROP: 50 SOLUTION OPHTHALMIC at 20:38

## 2019-05-28 RX ADMIN — EPHEDRINE SULFATE 10 MG: 50 INJECTION INTRAMUSCULAR; INTRAVENOUS; SUBCUTANEOUS at 07:12

## 2019-05-28 RX ADMIN — VITAMIN D, TAB 1000IU (100/BT) 1000 UNITS: 25 TAB at 12:03

## 2019-05-28 RX ADMIN — DEXAMETHASONE SODIUM PHOSPHATE 6 MG: 10 INJECTION INTRAMUSCULAR; INTRAVENOUS at 07:15

## 2019-05-28 RX ADMIN — CARBAMAZEPINE 100 MG: 100 CAPSULE, EXTENDED RELEASE ORAL at 20:38

## 2019-05-28 RX ADMIN — ONDANSETRON 4 MG: 2 INJECTION INTRAMUSCULAR; INTRAVENOUS at 07:34

## 2019-05-28 RX ADMIN — LIDOCAINE HYDROCHLORIDE 80 MG: 20 INJECTION, SOLUTION INFILTRATION; PERINEURAL at 06:51

## 2019-05-28 RX ADMIN — SODIUM CHLORIDE, POTASSIUM CHLORIDE, SODIUM LACTATE AND CALCIUM CHLORIDE 9 ML/HR: 600; 310; 30; 20 INJECTION, SOLUTION INTRAVENOUS at 06:04

## 2019-05-28 RX ADMIN — MELOXICAM 15 MG: 15 TABLET ORAL at 12:03

## 2019-05-28 RX ADMIN — OXYCODONE HYDROCHLORIDE AND ACETAMINOPHEN 1 TABLET: 10; 325 TABLET ORAL at 15:37

## 2019-05-28 RX ADMIN — ROPIVACAINE HYDROCHLORIDE 20 ML: 5 INJECTION, SOLUTION EPIDURAL; INFILTRATION; PERINEURAL at 06:29

## 2019-05-28 RX ADMIN — OXYCODONE HYDROCHLORIDE AND ACETAMINOPHEN 1 TABLET: 10; 325 TABLET ORAL at 20:38

## 2019-05-28 RX ADMIN — EPHEDRINE SULFATE 10 MG: 50 INJECTION INTRAMUSCULAR; INTRAVENOUS; SUBCUTANEOUS at 07:09

## 2019-05-28 RX ADMIN — SUGAMMADEX 150 MG: 100 INJECTION, SOLUTION INTRAVENOUS at 07:45

## 2019-05-28 RX ADMIN — EPHEDRINE SULFATE 5 MG: 50 INJECTION INTRAMUSCULAR; INTRAVENOUS; SUBCUTANEOUS at 07:27

## 2019-05-28 RX ADMIN — ROCURONIUM BROMIDE 40 MG: 10 INJECTION, SOLUTION INTRAVENOUS at 06:51

## 2019-05-28 RX ADMIN — SODIUM CHLORIDE, POTASSIUM CHLORIDE, SODIUM LACTATE AND CALCIUM CHLORIDE 9 ML/HR: 600; 310; 30; 20 INJECTION, SOLUTION INTRAVENOUS at 11:14

## 2019-05-28 RX ADMIN — ONDANSETRON HYDROCHLORIDE 4 MG: 2 SOLUTION INTRAMUSCULAR; INTRAVENOUS at 18:59

## 2019-05-28 NOTE — ANESTHESIA PROCEDURE NOTES
Airway  Urgency: elective    Airway not difficult    General Information and Staff    Patient location during procedure: OR  CRNA: Erin Stovall CRNA    Indications and Patient Condition  Indications for airway management: airway protection    Preoxygenated: yes  Mask difficulty assessment: 1 - vent by mask    Final Airway Details  Final airway type: endotracheal airway      Successful airway: ETT  Cuffed: yes   Successful intubation technique: direct laryngoscopy  Endotracheal tube insertion site: oral  Blade: Eugene  Blade size: 3  ETT size (mm): 7.0  Cormack-Lehane Classification: grade I - full view of glottis  Placement verified by: chest auscultation and capnometry   Measured from: lips  ETT to lips (cm): 21  Number of attempts at approach: 1    Additional Comments   ett cuff up at MOP

## 2019-05-28 NOTE — ANESTHESIA PREPROCEDURE EVALUATION
Anesthesia Evaluation     Patient summary reviewed and Nursing notes reviewed   NPO Solid Status: > 8 hours  NPO Liquid Status: > 2 hours           Airway   Mallampati: II  TM distance: >3 FB  Neck ROM: full  Dental - normal exam     Pulmonary - negative pulmonary ROS and normal exam   Cardiovascular - normal exam    ECG reviewed    (+) hypertension, hyperlipidemia,       Neuro/Psych- negative ROS  GI/Hepatic/Renal/Endo    (+)  GERD,  hypothyroidism,     Musculoskeletal     Abdominal    Substance History - negative use     OB/GYN negative ob/gyn ROS         Other   (+) arthritis                     Anesthesia Plan    ASA 3     general with block     Anesthetic plan, all risks, benefits, and alternatives have been provided, discussed and informed consent has been obtained with: patient.

## 2019-05-28 NOTE — ANESTHESIA PROCEDURE NOTES
Peripheral Block    Pre-sedation assessment completed: 5/28/2019 6:20 AM    Patient reassessed immediately prior to procedure    Patient location during procedure: holding area  Start time: 5/28/2019 6:20 AM  Stop time: 5/28/2019 6:29 AM  Reason for block: at surgeon's request and post-op pain management  Performed by  Anesthesiologist: Alex Yanes MD  Preanesthetic Checklist  Completed: patient identified, site marked, surgical consent, pre-op evaluation, timeout performed, IV checked, risks and benefits discussed and monitors and equipment checked  Prep:  Sterile barriers:cap, gloves and mask  Prep: ChloraPrep  Patient monitoring: blood pressure monitoring, continuous pulse oximetry and EKG  Procedure  Sedation:yes  Performed under: local infiltration  Guidance:ultrasound guided  ULTRASOUND INTERPRETATION.  Using ultrasound guidance a 22 G gauge needle was placed in close proximity to the brachial plexus nerve, at which point, under ultrasound guidance anesthetic was injected in the area of the nerve and spread of the anesthesia was seen on ultrasound in close proximity thereto.  There were no abnormalities seen on ultrasound; a digital image was taken; and the patient tolerated the procedure with no complications. Images:still images obtained    Laterality:left  Block Type:interscalene  Injection Technique:single-shot  Needle Type:echogenic  Resistance on Injection: none  Medications Used: ropivacaine (NAROPIN) 0.5 % injection, 20 mL  Post Assessment  Injection Assessment: negative aspiration for heme, no paresthesia on injection and incremental injection  Patient Tolerance:comfortable throughout block  Complications:no  Additional Notes  Ultrasound Interpretation:  Using ultrasound guidance the needle was placed in close proximity to the brachial plexus and anesthetic was injected in the area of the brachial plexus and spread of the anesthetic was seen on ultrasound in close proximity thereto.  There  were no abnormalities seen on ultrasound; a digital image was taken; and the patient tolerated the procedure with no complications.    Block placed for postoperative pain control per surgeon request.

## 2019-05-28 NOTE — ANESTHESIA POSTPROCEDURE EVALUATION
Patient: Tish Urias    Procedure Summary     Date:  05/28/19 Room / Location:   [facility] [address] [place] [address] [facility]    Anesthesia Start:  0642 Anesthesia Stop:  0801    Procedure:  LEFT TOTAL SHOULDER REVERSE ARTHROPLASTY (Left Shoulder) Diagnosis:      Surgeon:  Yoseph Galvan MD Provider:  Usama Jacobs MD    Anesthesia Type:  general with block ASA Status:  3          Anesthesia Type: general with block  Last vitals  BP   131/81 (05/28/19 0830)   Temp   36.4 °C (97.5 °F) (05/28/19 0800)   Pulse   72 (05/28/19 0830)   Resp   16 (05/28/19 0830)     SpO2   99 % (05/28/19 0830)     Post Anesthesia Care and Evaluation    Patient location during evaluation: bedside  Patient participation: complete - patient participated  Level of consciousness: awake  Pain score: 2  Pain management: adequate  Airway patency: patent  Anesthetic complications: No anesthetic complications  PONV Status: none  Cardiovascular status: acceptable  Respiratory status: acceptable  Hydration status: acceptable    Comments: /81 (BP Location: Right arm, Patient Position: Lying)   Pulse 72   Temp 36.4 °C (97.5 °F) (Oral)   Resp 16   SpO2 99%

## 2019-05-29 VITALS
BODY MASS INDEX: 27.46 KG/M2 | DIASTOLIC BLOOD PRESSURE: 58 MMHG | OXYGEN SATURATION: 97 % | HEIGHT: 63 IN | WEIGHT: 154.98 LBS | RESPIRATION RATE: 16 BRPM | TEMPERATURE: 98.8 F | HEART RATE: 97 BPM | SYSTOLIC BLOOD PRESSURE: 105 MMHG

## 2019-05-29 LAB
ANION GAP SERPL CALCULATED.3IONS-SCNC: 13.7 MMOL/L
BUN BLD-MCNC: 16 MG/DL (ref 8–23)
BUN/CREAT SERPL: 19.3 (ref 7–25)
CALCIUM SPEC-SCNC: 8.5 MG/DL (ref 8.6–10.5)
CHLORIDE SERPL-SCNC: 100 MMOL/L (ref 98–107)
CO2 SERPL-SCNC: 23.3 MMOL/L (ref 22–29)
CREAT BLD-MCNC: 0.83 MG/DL (ref 0.57–1)
GFR SERPL CREATININE-BSD FRML MDRD: 68 ML/MIN/1.73
GLUCOSE BLD-MCNC: 113 MG/DL (ref 65–99)
HCT VFR BLD AUTO: 31.5 % (ref 34–46.6)
HGB BLD-MCNC: 10.1 G/DL (ref 12–15.9)
PLATELET # BLD AUTO: 178 10*3/MM3 (ref 140–450)
POTASSIUM BLD-SCNC: 4.1 MMOL/L (ref 3.5–5.2)
SODIUM BLD-SCNC: 137 MMOL/L (ref 136–145)

## 2019-05-29 PROCEDURE — 85014 HEMATOCRIT: CPT | Performed by: ORTHOPAEDIC SURGERY

## 2019-05-29 PROCEDURE — 85018 HEMOGLOBIN: CPT | Performed by: ORTHOPAEDIC SURGERY

## 2019-05-29 PROCEDURE — 80048 BASIC METABOLIC PNL TOTAL CA: CPT | Performed by: ORTHOPAEDIC SURGERY

## 2019-05-29 PROCEDURE — 85049 AUTOMATED PLATELET COUNT: CPT | Performed by: ORTHOPAEDIC SURGERY

## 2019-05-29 PROCEDURE — 25010000002 ENOXAPARIN PER 10 MG: Performed by: ORTHOPAEDIC SURGERY

## 2019-05-29 RX ORDER — ASPIRIN 325 MG
325 TABLET ORAL DAILY
Qty: 30 TABLET | Refills: 0
Start: 2019-05-29 | End: 2019-06-28

## 2019-05-29 RX ORDER — HYDROCODONE BITARTRATE AND ACETAMINOPHEN 10; 325 MG/1; MG/1
1-2 TABLET ORAL EVERY 4 HOURS PRN
Qty: 50 TABLET | Refills: 0
Start: 2019-05-29 | End: 2021-08-10

## 2019-05-29 RX ADMIN — SODIUM CHLORIDE 750 MG: 900 INJECTION, SOLUTION INTRAVENOUS at 06:30

## 2019-05-29 RX ADMIN — PANTOPRAZOLE SODIUM 40 MG: 40 TABLET, DELAYED RELEASE ORAL at 06:30

## 2019-05-29 RX ADMIN — VITAMIN D, TAB 1000IU (100/BT) 1000 UNITS: 25 TAB at 08:20

## 2019-05-29 RX ADMIN — LEVOTHYROXINE SODIUM 88 MCG: 88 TABLET ORAL at 06:30

## 2019-05-29 RX ADMIN — MELOXICAM 15 MG: 15 TABLET ORAL at 08:20

## 2019-05-29 RX ADMIN — ENOXAPARIN SODIUM 30 MG: 30 INJECTION SUBCUTANEOUS at 08:20

## 2019-09-26 ENCOUNTER — OFFICE VISIT CONVERTED (OUTPATIENT)
Dept: FAMILY MEDICINE CLINIC | Age: 74
End: 2019-09-26
Attending: FAMILY MEDICINE

## 2019-10-07 ENCOUNTER — HOSPITAL ENCOUNTER (OUTPATIENT)
Dept: OTHER | Facility: HOSPITAL | Age: 74
Discharge: HOME | End: 2019-10-07
Attending: FAMILY MEDICINE

## 2019-10-07 LAB
ALBUMIN SERPL-MCNC: 4.4 G/DL (ref 3.5–5)
ALBUMIN/GLOB SERPL: 1.7 {RATIO} (ref 1.4–2.6)
ALP SERPL-CCNC: 101 U/L (ref 43–160)
ALT SERPL-CCNC: 15 U/L (ref 10–40)
ANION GAP SERPL CALC-SCNC: 18 MMOL/L (ref 8–19)
AST SERPL-CCNC: 18 U/L (ref 15–50)
BILIRUB SERPL-MCNC: <0.15 MG/DL (ref 0.2–1.3)
BUN SERPL-MCNC: 19 MG/DL (ref 5–25)
BUN/CREAT SERPL: 18 {RATIO} (ref 6–20)
CALCIUM SERPL-MCNC: 8.9 MG/DL (ref 8.7–10.4)
CARBAMAZEPINE SERPL-MCNC: 2.8 UG/ML (ref 4–12)
CHLORIDE SERPL-SCNC: 102 MMOL/L (ref 99–111)
CHOLEST SERPL-MCNC: 269 MG/DL (ref 107–200)
CHOLEST/HDLC SERPL: 5.2 {RATIO} (ref 3–6)
CONV CO2: 24 MMOL/L (ref 22–32)
CONV TOTAL PROTEIN: 7 G/DL (ref 6.3–8.2)
CREAT UR-MCNC: 1.04 MG/DL (ref 0.5–0.9)
ERYTHROCYTE [DISTWIDTH] IN BLOOD BY AUTOMATED COUNT: 13.2 % (ref 11.7–14.4)
GFR SERPLBLD BASED ON 1.73 SQ M-ARVRAT: 54 ML/MIN/{1.73_M2}
GLOBULIN UR ELPH-MCNC: 2.6 G/DL (ref 2–3.5)
GLUCOSE SERPL-MCNC: 94 MG/DL (ref 65–99)
HCT VFR BLD AUTO: 37.4 % (ref 37–47)
HDLC SERPL-MCNC: 52 MG/DL (ref 40–60)
HGB BLD-MCNC: 12 G/DL (ref 12–16)
LDLC SERPL CALC-MCNC: 159 MG/DL (ref 70–100)
MCH RBC QN AUTO: 28.7 PG (ref 27–31)
MCHC RBC AUTO-ENTMCNC: 32.1 G/DL (ref 33–37)
MCV RBC AUTO: 89.5 FL (ref 81–99)
OSMOLALITY SERPL CALC.SUM OF ELEC: 292 MOSM/KG (ref 273–304)
PLATELET # BLD AUTO: 232 10*3/UL (ref 130–400)
PMV BLD AUTO: 9.3 FL (ref 9.4–12.3)
POTASSIUM SERPL-SCNC: 4.3 MMOL/L (ref 3.5–5.3)
RBC # BLD AUTO: 4.18 10*6/UL (ref 4.2–5.4)
SODIUM SERPL-SCNC: 140 MMOL/L (ref 135–147)
TRIGL SERPL-MCNC: 289 MG/DL (ref 40–150)
TSH SERPL-ACNC: 2.5 M[IU]/L (ref 0.27–4.2)
VLDLC SERPL-MCNC: 58 MG/DL (ref 5–37)
WBC # BLD AUTO: 6.58 10*3/UL (ref 4.8–10.8)

## 2020-06-11 ENCOUNTER — OFFICE VISIT CONVERTED (OUTPATIENT)
Dept: FAMILY MEDICINE CLINIC | Age: 75
End: 2020-06-11
Attending: FAMILY MEDICINE

## 2020-07-16 ENCOUNTER — HOSPITAL ENCOUNTER (OUTPATIENT)
Dept: OTHER | Facility: HOSPITAL | Age: 75
Discharge: HOME | End: 2020-07-16
Attending: FAMILY MEDICINE

## 2020-07-16 LAB
ALBUMIN SERPL-MCNC: 4.1 G/DL (ref 3.5–5)
ALBUMIN/GLOB SERPL: 1.7 {RATIO} (ref 1.4–2.6)
ALP SERPL-CCNC: 97 U/L (ref 43–160)
ALT SERPL-CCNC: 16 U/L (ref 10–40)
ANION GAP SERPL CALC-SCNC: 14 MMOL/L (ref 8–19)
AST SERPL-CCNC: 24 U/L (ref 15–50)
BILIRUB SERPL-MCNC: 0.29 MG/DL (ref 0.2–1.3)
BUN SERPL-MCNC: 19 MG/DL (ref 5–25)
BUN/CREAT SERPL: 16 {RATIO} (ref 6–20)
CALCIUM SERPL-MCNC: 8.9 MG/DL (ref 8.7–10.4)
CARBAMAZEPINE SERPL-MCNC: 2.7 UG/ML (ref 4–12)
CHLORIDE SERPL-SCNC: 103 MMOL/L (ref 99–111)
CONV CO2: 25 MMOL/L (ref 22–32)
CONV TOTAL PROTEIN: 6.5 G/DL (ref 6.3–8.2)
CREAT UR-MCNC: 1.16 MG/DL (ref 0.5–0.9)
ERYTHROCYTE [DISTWIDTH] IN BLOOD BY AUTOMATED COUNT: 13.1 % (ref 11.5–14.5)
GFR SERPLBLD BASED ON 1.73 SQ M-ARVRAT: 47 ML/MIN/{1.73_M2}
GLOBULIN UR ELPH-MCNC: 2.4 G/DL (ref 2–3.5)
GLUCOSE SERPL-MCNC: 99 MG/DL (ref 65–99)
HBA1C MFR BLD: 12.7 G/DL (ref 12–16)
HCT VFR BLD AUTO: 38.2 % (ref 37–47)
MCH RBC QN AUTO: 29.4 PG (ref 27–31)
MCHC RBC AUTO-ENTMCNC: 33.2 G/DL (ref 33–37)
MCV RBC AUTO: 88.4 FL (ref 81–99)
OSMOLALITY SERPL CALC.SUM OF ELEC: 286 MOSM/KG (ref 273–304)
PLATELET # BLD AUTO: 201 10*3/UL (ref 130–400)
PMV BLD AUTO: 8.6 FL (ref 7.4–10.4)
POTASSIUM SERPL-SCNC: 4.7 MMOL/L (ref 3.5–5.3)
RBC # BLD AUTO: 4.32 10*6/UL (ref 4.2–5.4)
SODIUM SERPL-SCNC: 137 MMOL/L (ref 135–147)
TSH SERPL-ACNC: 3.7 M[IU]/L (ref 0.27–4.2)
WBC # BLD AUTO: 5.91 10*3/UL (ref 4.8–10.8)

## 2020-08-06 ENCOUNTER — HOSPITAL ENCOUNTER (OUTPATIENT)
Dept: OTHER | Facility: HOSPITAL | Age: 75
Discharge: HOME | End: 2020-08-06
Attending: FAMILY MEDICINE

## 2020-08-06 LAB
ANION GAP SERPL CALC-SCNC: 17 MMOL/L (ref 8–19)
APPEARANCE UR: CLEAR
BILIRUB UR QL: NEGATIVE
BUN SERPL-MCNC: 19 MG/DL (ref 5–25)
BUN/CREAT SERPL: 16 {RATIO} (ref 6–20)
CALCIUM SERPL-MCNC: 9.5 MG/DL (ref 8.7–10.4)
CHLORIDE SERPL-SCNC: 101 MMOL/L (ref 99–111)
COLOR UR: YELLOW
CONV BACTERIA: NEGATIVE
CONV CO2: 23 MMOL/L (ref 22–32)
CONV COLLECTION SOURCE (UA): ABNORMAL
CONV UROBILINOGEN IN URINE BY AUTOMATED TEST STRIP: 0.2 {EHRLICHU}/DL (ref 0.1–1)
CREAT UR-MCNC: 1.22 MG/DL (ref 0.5–0.9)
GFR SERPLBLD BASED ON 1.73 SQ M-ARVRAT: 44 ML/MIN/{1.73_M2}
GLUCOSE SERPL-MCNC: 98 MG/DL (ref 65–99)
GLUCOSE UR QL: NEGATIVE MG/DL
HGB UR QL STRIP: NEGATIVE
KETONES UR QL STRIP: NEGATIVE MG/DL
LEUKOCYTE ESTERASE UR QL STRIP: ABNORMAL
NITRITE UR QL STRIP: NEGATIVE
OSMOLALITY SERPL CALC.SUM OF ELEC: 284 MOSM/KG (ref 273–304)
PH UR STRIP.AUTO: 6 [PH] (ref 5–8)
POTASSIUM SERPL-SCNC: 4.7 MMOL/L (ref 3.5–5.3)
PROT UR QL: NEGATIVE MG/DL
RBC #/AREA URNS HPF: ABNORMAL /[HPF]
SODIUM SERPL-SCNC: 136 MMOL/L (ref 135–147)
SP GR UR: 1.01 (ref 1–1.03)
WBC #/AREA URNS HPF: ABNORMAL /[HPF]

## 2020-08-10 ENCOUNTER — HOSPITAL ENCOUNTER (OUTPATIENT)
Dept: OTHER | Facility: HOSPITAL | Age: 75
Discharge: HOME | End: 2020-08-10
Attending: FAMILY MEDICINE

## 2020-11-04 ENCOUNTER — HOSPITAL ENCOUNTER (OUTPATIENT)
Dept: OTHER | Facility: HOSPITAL | Age: 75
Discharge: HOME | End: 2020-11-04
Attending: FAMILY MEDICINE

## 2020-11-04 LAB
ANION GAP SERPL CALC-SCNC: 18 MMOL/L (ref 8–19)
BUN SERPL-MCNC: 19 MG/DL (ref 5–25)
BUN/CREAT SERPL: 19 {RATIO} (ref 6–20)
CALCIUM SERPL-MCNC: 8.9 MG/DL (ref 8.7–10.4)
CHLORIDE SERPL-SCNC: 102 MMOL/L (ref 99–111)
CONV CO2: 23 MMOL/L (ref 22–32)
CREAT UR-MCNC: 0.99 MG/DL (ref 0.5–0.9)
GFR SERPLBLD BASED ON 1.73 SQ M-ARVRAT: 57 ML/MIN/{1.73_M2}
GLUCOSE SERPL-MCNC: 98 MG/DL (ref 65–99)
OSMOLALITY SERPL CALC.SUM OF ELEC: 288 MOSM/KG (ref 273–304)
POTASSIUM SERPL-SCNC: 4.6 MMOL/L (ref 3.5–5.3)
SODIUM SERPL-SCNC: 138 MMOL/L (ref 135–147)

## 2021-01-05 ENCOUNTER — OFFICE VISIT CONVERTED (OUTPATIENT)
Dept: FAMILY MEDICINE CLINIC | Age: 76
End: 2021-01-05
Attending: FAMILY MEDICINE

## 2021-02-23 ENCOUNTER — HOSPITAL ENCOUNTER (OUTPATIENT)
Dept: VACCINE CLINIC | Facility: HOSPITAL | Age: 76
Discharge: HOME | End: 2021-02-23
Attending: INTERNAL MEDICINE

## 2021-02-23 ENCOUNTER — HOSPITAL ENCOUNTER (OUTPATIENT)
Dept: OTHER | Facility: HOSPITAL | Age: 76
Discharge: HOME | End: 2021-02-23
Attending: FAMILY MEDICINE

## 2021-02-23 LAB
ALBUMIN SERPL-MCNC: 4.1 G/DL (ref 3.5–5)
ALBUMIN/GLOB SERPL: 1.6 {RATIO} (ref 1.4–2.6)
ALP SERPL-CCNC: 100 U/L (ref 43–160)
ALT SERPL-CCNC: 12 U/L (ref 10–40)
ANION GAP SERPL CALC-SCNC: 14 MMOL/L (ref 8–19)
AST SERPL-CCNC: 19 U/L (ref 15–50)
BILIRUB SERPL-MCNC: 0.27 MG/DL (ref 0.2–1.3)
BUN SERPL-MCNC: 20 MG/DL (ref 5–25)
BUN/CREAT SERPL: 17 {RATIO} (ref 6–20)
CALCIUM SERPL-MCNC: 9 MG/DL (ref 8.7–10.4)
CARBAMAZEPINE SERPL-MCNC: 2.8 UG/ML (ref 4–12)
CHLORIDE SERPL-SCNC: 99 MMOL/L (ref 99–111)
CHOLEST SERPL-MCNC: 267 MG/DL (ref 107–200)
CHOLEST/HDLC SERPL: 4.9 {RATIO} (ref 3–6)
CONV CO2: 28 MMOL/L (ref 22–32)
CONV TOTAL PROTEIN: 6.7 G/DL (ref 6.3–8.2)
CREAT UR-MCNC: 1.2 MG/DL (ref 0.5–0.9)
ERYTHROCYTE [DISTWIDTH] IN BLOOD BY AUTOMATED COUNT: 13.5 % (ref 11.5–14.5)
GFR SERPLBLD BASED ON 1.73 SQ M-ARVRAT: 45 ML/MIN/{1.73_M2}
GLOBULIN UR ELPH-MCNC: 2.6 G/DL (ref 2–3.5)
GLUCOSE SERPL-MCNC: 100 MG/DL (ref 65–99)
HBA1C MFR BLD: 12.5 G/DL (ref 12–16)
HCT VFR BLD AUTO: 38.3 % (ref 37–47)
HDLC SERPL-MCNC: 54 MG/DL (ref 40–60)
LDLC SERPL CALC-MCNC: 170 MG/DL (ref 70–100)
MCH RBC QN AUTO: 29.2 PG (ref 27–31)
MCHC RBC AUTO-ENTMCNC: 32.6 G/DL (ref 33–37)
MCV RBC AUTO: 89.5 FL (ref 81–99)
OSMOLALITY SERPL CALC.SUM OF ELEC: 285 MOSM/KG (ref 273–304)
PLATELET # BLD AUTO: 254 10*3/UL (ref 130–400)
PMV BLD AUTO: 9.1 FL (ref 7.4–10.4)
POTASSIUM SERPL-SCNC: 4.7 MMOL/L (ref 3.5–5.3)
RBC # BLD AUTO: 4.28 10*6/UL (ref 4.2–5.4)
SODIUM SERPL-SCNC: 136 MMOL/L (ref 135–147)
T4 FREE SERPL-MCNC: 1.4 NG/DL (ref 0.9–1.8)
TRIGL SERPL-MCNC: 213 MG/DL (ref 40–150)
TSH SERPL-ACNC: 2.03 M[IU]/L (ref 0.27–4.2)
VLDLC SERPL-MCNC: 43 MG/DL (ref 5–37)
WBC # BLD AUTO: 7.38 10*3/UL (ref 4.8–10.8)

## 2021-03-26 ENCOUNTER — HOSPITAL ENCOUNTER (OUTPATIENT)
Dept: VACCINE CLINIC | Facility: HOSPITAL | Age: 76
Discharge: HOME | End: 2021-03-26
Attending: INTERNAL MEDICINE

## 2021-05-17 ENCOUNTER — HOSPITAL ENCOUNTER (OUTPATIENT)
Dept: OTHER | Facility: HOSPITAL | Age: 76
Discharge: HOME | End: 2021-05-17

## 2021-05-18 NOTE — PROGRESS NOTES
Tish Urias 1945     Office/Outpatient Visit    Visit Date:  11:37 am    Provider: Samir Coto MD (Assistant: Jeanette Galvan MA)    Location: [facility]        Electronically signed by Samir Coto MD on  2019 05:19:23 PM                             SUBJECTIVE:        CC: 'I've got a head full of snot'         HPI:         Patient to be evaluated for acute maxillary sinusitis.  This has been a problem for the past week.  This is an acute problem without chronic or recurrent episodes.  Her primary symptoms include cough, facial pressure, fever, headache, nasal congestion, post-nasal drip and purulent rhinorrhea.  She has already tried a decongestant.  Medical history is pertinent for allergies.      ROS:     CONSTITUTIONAL:  Positive for fever.   Negative for chills.      CARDIOVASCULAR:  Negative for chest pain and palpitations.      GASTROINTESTINAL:  Negative for abdominal pain, nausea and vomiting.      INTEGUMENTARY/BREAST:  Negative for atypical mole(s) and rash.          PMH/FM/SH:     Last Reviewed on 2018 01:27 PM by Samir Coto    Past Medical History:                 PAST MEDICAL HISTORY         Hypertension: controlled;     Hypothyroidism: dx'd at age 61;     Iron Deficiency Anemia: CEASED AT MENOPAUSE;     Allergies: perennial;     NEAR SIGHTED         GYNECOLOGICAL HISTORY:     miscarriage 1    Menopause at age 57.  Hospitalizations: Never         CURRENT MEDICAL PROVIDERS:    GENERAL FAMILY  , IN [facility] , SHE WILL HAVE RECORDS SENT             ADVANCE DIRECTIVES: Living will on file         PREVENTIVE HEALTH MAINTENANCE             COLORECTAL CANCER SCREENING: Up to date (colonoscopy q10y; sigmoidoscopy q5y; Cologuard q3y) was last done 2012, Results are in chart; colonoscopy with normal results     MAMMOGRAM: Done within last 2 years and results in are chart was last done 18 with normal results         Surgical  History:         Positive for    Tonsillectomy + Adenoidectomy; at age AS CHILD;     Positive for    Varicose Vein Stripping ;     Positive for    CARPEL TUNNEL REPAIR , ;;     Negative for    Colonoscopy and    Treadmill stress test;         Family History:     Father: Cause of death was ACCIDENTAL DEATH     Mother: Healthy;  Hypertension     Brother(s): Healthy; 1 brother(s) total     Sister(s): Healthy; 2 sister(s) total     Paternal Grandfather: Alcoholism     Paternal Grandmother: Medical history unknown;      Maternal Grandfather: Healthy;   at age 82     Maternal Grandmother:  at age 60; Cause of death was anurysm         Social History:     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          Alcohol:  Does not drink alcohol and never has.          Substance Abuse History:     Last Reviewed on 2018 01:27 PM by Samir Coto        Mental Health History:     Last Reviewed on 2018 01:27 PM by Samir Coto        Communicable Diseases (eg STDs):     Last Reviewed on 2018 01:27 PM by Samir Coto            Current Problems:     Last Reviewed on 2018 01:27 PM by Samir Coto    Hypercholesterolemia     Use of high risk medications     Acquired hypothyroidism     Hypertension     Joint pain, shoulder region     Trigeminal neuralgia         Immunizations:     Zostavax (Zoster live) 10/18/2012         Allergies:     Last Reviewed on 2019 11:39 AM by Jeanette Galvan    Penicillins:        Current Medications:     Last Reviewed on 2019 11:39 AM by Jeanette Galvan    Atenolol 50mg Tablet Take 1 tablet(s) by mouth daily     Carbamazepine 100mg Capsules, Extended Release Take 1 capsule(s) by mouth bid     Mobic 15mg  Tablet Take 1 tablet(s) by mouth daily with food.     Omeprazole 20mg Capsules, Extended Release Take 1 capsule(s) by mouth daily     Synthroid 0.088mg Tablet Take 1 tablet(s) by mouth daily     Singulair  10mg Tablet Take 1 tablet(s) by mouth each evening         OBJECTIVE:        Vitals:         Current: 1/28/2019 11:40:51 AM    Ht:  5 ft, 2.25 in;  Wt: 154.6 lbs;  BMI: 28.1    T: 98.2 F (oral);  BP: 140/79 mm Hg (right arm, sitting);  P: 78 bpm (right arm (BP Cuff), sitting);  sCr: 1.1 mg/dL;  GFR: 46.49        Exams:     PHYSICAL EXAM:     GENERAL: vital signs recorded - well developed, well nourished;  no apparent distress;     EYES: extraocular movements intact; conjunctiva and cornea are normal; PERRLA;     E/N/T: EARS:  normal external auditory canals and tympanic membranes;  grossly normal hearing; NOSE: nasal mucosa is boggy and partially obscured by clear drainage;  OROPHARYNX:  normal mucosa, dentition, gingiva, and posterior pharynx;     NECK: range of motion is normal; thyroid is non-palpable;     RESPIRATORY: normal respiratory rate and pattern with no distress; normal breath sounds with no rales, rhonchi, wheezes or rubs;     CARDIOVASCULAR: normal rate; rhythm is regular;  no systolic murmur; no edema;     GASTROINTESTINAL: nontender; normal bowel sounds; no organomegaly;     BREAST/INTEGUMENT: SKIN: no significant rashes or lesions; no suspicious moles;         ASSESSMENT           461.0   J01.90  Acute maxillary sinusitis              DDx:         ORDERS:         Meds Prescribed:       Zithromax (Azithromycin) 250mg Tablet Take 2 tablet(s) by mouth on day 1 then 1 tablet every day for the next 4 days.  #6 (Six) tablet(s) Refills: 0       Prednisone 20mg Tablet Take  2 tablet(s) by mouth qd  #10 (Ten) tablet(s) Refills: 0         Radiology/Test Orders:       3014F  Screening mammography results documented and reviewed (PV)1  (In-House)         3017F  Colorectal CA screen results documented and  reviewed (PV)  (In-House)           Other Orders:         Calculated BMI above the upper parameter and a follow-up plan was documented in the medical record  (In-House)                   PLAN:          Acute maxillary sinusitis         RECOMMENDATIONS given include: Her symptoms and duration seem consistent with sinus infection.  I'm going to recommend treating her as noted below for presumed sinus infection.  If she does not see improvement, she will return.  We will follow closely..  MIPS     MAMMOGRAM: Done within last 2 years and results in are chart     COLORECTAL CANCER SCREENING: Results are in chart     BMI Elevated - Follow-Up Plan: She was provided education on weight loss strategies           Prescriptions:       Zithromax (Azithromycin) 250mg Tablet Take 2 tablet(s) by mouth on day 1 then 1 tablet every day for the next 4 days.  #6 (Six) tablet(s) Refills: 0       Prednisone 20mg Tablet Take  2 tablet(s) by mouth qd  #10 (Ten) tablet(s) Refills: 0           Orders:       3014F  Screening mammography results documented and reviewed (PV)1  (In-House)         3017F  Colorectal CA screen results documented and reviewed (PV)  (In-House)           Calculated BMI above the upper parameter and a follow-up plan was documented in the medical record  (In-House)             Patient Education Handouts:       Sinus Infection (Sinusitis)              CHARGE CAPTURE           **Please note: ICD descriptions below are intended for billing purposes only and may not represent clinical diagnoses**        Primary Diagnosis:         461.0 Acute maxillary sinusitis            J01.90    Acute sinusitis, unspecified              Orders:          01148   Office/outpatient visit; established patient, level 3  (In-House)             3014F   Screening mammography results documented and reviewed (PV)1  (In-House)             3017F   Colorectal CA screen results documented and reviewed (PV)  (In-House)                 Calculated BMI above the upper parameter and a follow-up plan was documented in the medical record  (In-House)

## 2021-05-18 NOTE — PROGRESS NOTES
Tish Urias  1945     Office/Outpatient Visit    Visit Date: Tue, Jan 5, 2021 11:18 am    Provider: Samir Coto MD (Assistant: Jeanette Galvan MA)    Location: [facility]        Electronically signed by Samir Coto MD on  01/06/2021 07:36:56 PM                             Subjective:        CC: blood pressure, thyroid, other issues        TELEMEDICINE VISIT:    - Tish consented to this telemedicine visit.    - Persons present during the telemedicine consultation include:  Tish - patient, Dr. Coto    - This visit is being conducted over FaceTime with audio and video.    HPI:           Patient to be evaluated for essential (primary) hypertension.  Her current cardiac medication regimen includes a beta-blocker ( Tenormin ).  Ms. Urias does not check her blood pressure other than at her clinic appointments.  She is tolerating the medication well without side effects.  Compliance with treatment has been good; she takes her medication as directed.            With regard to the other specified hypothyroidism, she is currently taking Synthroid, 88 mcg daily.  TSH was last checked more than 6 months ago.  The result was reported as normal.            She also has trigeminal neuralgia for which she takes Tegretol.  She tolerates that well and denies acute issue or side effect.          Tish has had some issue with her kidney numbers creeping up in the last few months.  She has increased fluid intake and stopped taking meloxicam.  Her numbers have stabilized, but she does need to have that rechecked.  She continues to struggle with other pain.    ROS:     CONSTITUTIONAL:  Negative for chills and fever.      CARDIOVASCULAR:  Negative for chest pain and palpitations.      RESPIRATORY:  Negative for recent cough and dyspnea.      GASTROINTESTINAL:  Negative for abdominal pain, nausea and vomiting.      INTEGUMENTARY/BREAST:  Negative for atypical mole(s) and rash.          Past  Medical History / Family History / Social History:         Last Reviewed on 2021 11:46 AM by Samir Coto    Past Medical History:                 PAST MEDICAL HISTORY         Hypertension: controlled;     Hypothyroidism: dx'd at age 61;     Iron Deficiency Anemia: CEASED AT MENOPAUSE;     Allergies: perennial;     NEAR SIGHTED         GYNECOLOGICAL HISTORY:     miscarriage 1    Menopause at age 57.  Hospitalizations: Never             ADVANCE DIRECTIVES: Living will on file - Her  and/or children would make decisions if needed.         PREVENTIVE HEALTH MAINTENANCE             BONE DENSITY: was last done 10/7/19 with the following abnormality noted-- Osteopenia     COLORECTAL CANCER SCREENING: Up to date (colonoscopy q10y; sigmoidoscopy q5y; Cologuard q3y) was last done 2012, Results are in chart; colonoscopy with normal results     MAMMOGRAM: Done within last 2 years and results in are chart was last done 10/8/19 with normal results         Surgical History:         Positive for    Tonsillectomy + Adenoidectomy; at age AS CHILD;     Positive for    Varicose Vein Stripping ;     Positive for    CARPEL TUNNEL REPAIR , ;;     Negative for    Colonoscopy and    Treadmill stress test;     Cholecystectomy: laparoscopic; ;     Procedures:    EGD (  )         Family History:     Father: Cause of death was ACCIDENTAL DEATH     Mother: Healthy;  Hypertension     Brother(s): Healthy; 1 brother(s) total     Sister(s): Healthy; 2 sister(s) total     Paternal Grandfather: Alcoholism     Paternal Grandmother: Medical history unknown;      Maternal Grandfather: Healthy;   at age 82     Maternal Grandmother:  at age 60; Cause of death was anurysm         Social History:     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          Alcohol:  Does not drink alcohol and never has.          Substance Abuse History:     Last Reviewed on 1/05/2021 11:46 AM by Samir Coto        Mental Health History:     Last Reviewed on 1/05/2021 11:46 AM by Samir Coto        Communicable Diseases (eg STDs):     Last Reviewed on 1/05/2021 11:46 AM by Samir Coto        Current Problems:     Last Reviewed on 1/05/2021 11:46 AM by Samir Coto    Essential (primary) hypertension    Other specified hypothyroidism    Trigeminal neuralgia    Pain in right shoulder    Pain in left shoulder    Abnormal results of kidney function studies    Other hyperlipidemia        Immunizations:     influenza, high-dose, quadrivalent (FLUZONE HIGH-DOSE QUAD 2020-21) 10/26/2020    Adacel (Tdap) 2/21/2019    Zostavax (Zoster live) 10/18/2012        Allergies:     Last Reviewed on 1/05/2021 11:46 AM by Samir Coto    Penicillins:          Current Medications:     Last Reviewed on 1/05/2021 11:46 AM by Samir Coto    loratadine 10 mg oral tablet [take 1 tablet (10 mg) by oral route once daily]    fluticasone propionate 50 mcg/actuation Intranasal Spray, Suspension [inhale 1 spray (50 mcg) in each nostril by intranasal route prn]    Carbamazepine 100 mg oral Capsule, Extended Release Multiphase 12 hr [Take 1 capsule(s) by mouth bid]    atenoloL 50 mg oral tablet [Take 1 tablet by mouth once daily]    Euthyrox 88 mcg oral tablet [Take 1 tablet by mouth once daily]    Omeprazole 20 mg oral capsule,delayed release (enteric coated) [Take 1 capsule(s) by mouth daily]    Singulair  10mg Tablet [Take 1 tablet(s) by mouth each evening]    Latanoprost 0.005 % ophthalmic (eye) Drops [Instill 1 drop(s) into both eye(s) each  evening]    Lotemax 0.5 % ophthalmic (eye) Drops, Gel [QID PRN]        Objective:        Exams:     PHYSICAL EXAM:     GENERAL: well  developed, well nourished;  no apparent distress;     EYES: extraocular movements intact; conjunctiva and cornea are normal;     RESPIRATORY: normal respiratory rate and pattern with no distress;     LYMPHATIC: no enlargement of cervical or facial nodes; no supraclavicular nodes;     BREAST/INTEGUMENT: SKIN: no significant rashes or lesions; no suspicious moles;     NEUROLOGIC: mental status: alert and oriented x 3; cranial nerves II-XII grossly intact;     PSYCHIATRIC: appropriate affect and demeanor; normal psychomotor function;         Assessment:         I10   Essential (primary) hypertension       E03.8   Other specified hypothyroidism       G50.0   Trigeminal neuralgia       R94.4   Abnormal results of kidney function studies       E78.4   Other hyperlipidemia           ORDERS:         Meds Prescribed:       [Refilled] Carbamazepine 100 mg oral Capsule, Extended Release Multiphase 12 hr [Take 1 capsule(s) by mouth bid], #180 (one hundred and eighty) capsules, Refills: 1 (one)       [Refilled] Omeprazole 20 mg oral capsule,delayed release (enteric coated) [Take 1 capsule(s) by mouth daily], #90 (ninety) capsules, Refills: 1 (one)       [Refilled] atenoloL 50 mg oral tablet [take 1 tablet (50 mg) by oral route once daily], #90 (ninety) tablets, Refills: 1 (one)       [Refilled] Euthyrox 88 mcg oral tablet [take 1 tablet (88 mcg) by oral route once daily], #90 (ninety) tablets, Refills: 1 (one)         Radiology/Test Orders:       3017F  Colorectal CA screen results documented and reviewed (PV)  (In-House)              Lab Orders:       60188  TEG - [facility] Carbamazepine (Tegretol)  (Send-Out)            75826  BDCB2 - [facility] CBC w/o diff  (Send-Out)            55697  HTNLP - [facility] CMP AND LIPID: [id number], [id number]  (Send-Out)            03355  THYII - [facility] Thyroid panel with TSH (85024, 37769)  (Send-Out)              Other Orders:       1101F  Pt screen for fall risk; document no falls in past year or only 1 fall w/o injury in  past year (BEST)  (In-House)              Screening mammogram results documented  (Send-Out)            1123F  Advance Care Planning discussed and doc; advance care plan or surrogate decision maker doc. in MR  (Send-Out)                      Plan:         Essential (primary) hypertension        RECOMMENDATIONS given include: Tish seems well today.  I'm going to refill her medications and update labs as noted below.  Her kidney functions have been a little elevated.  We will continue to follow those for her.  Also, I will recheck the cholesterol, but we will give her a little time to work on her diet and health this month..  CHAYA Has had no falls or only one fall without injury in the past year Screening mammomgram done within last 2 years and results in are chart Colorectal Cancer Screening is up to date and the results are in the chart ACP discussion: Advance Directive/Surrogate Decision Maker discussed and scanned into chart           Prescriptions:       [Refilled] Carbamazepine 100 mg oral Capsule, Extended Release Multiphase 12 hr [Take 1 capsule(s) by mouth bid], #180 (one hundred and eighty) capsules, Refills: 1 (one)       [Refilled] Omeprazole 20 mg oral capsule,delayed release (enteric coated) [Take 1 capsule(s) by mouth daily], #90 (ninety) capsules, Refills: 1 (one)       [Refilled] atenoloL 50 mg oral tablet [take 1 tablet (50 mg) by oral route once daily], #90 (ninety) tablets, Refills: 1 (one)       [Refilled] Euthyrox 88 mcg oral tablet [take 1 tablet (88 mcg) by oral route once daily], #90 (ninety) tablets, Refills: 1 (one)           Orders:       1101F  Pt screen for fall risk; document no falls in past year or only 1 fall w/o injury in past year (BEST)  (In-House)              Screening mammogram results documented  (Send-Out)            3017F  Colorectal CA screen results documented and reviewed (PV)  (In-House)            1123F  Advance Care Planning discussed and doc; advance care plan  or surrogate decision maker doc. in MR  (Send-Out)              Other specified hypothyroidismAs above.        Trigeminal neuralgia    LABORATORY:  Labs ordered to be performed today include CBC W/O DIFF.            Orders:       99808  TEG - [facility] Carbamazepine (Tegretol)  (Send-Out)            81839  BDCB2 - [facility] CBC w/o diff  (Send-Out)              Abnormal results of kidney function studiesAs above.        Other hyperlipidemia    LABORATORY:  Labs ordered to be performed today include HTN/Lipid Panel: CMP, Lipid and Thyroid Panel.            Orders:       43262  HTNLP - [facility] CMP AND LIPID: [id number], [id number]  (Send-Out)            09432  THYII - [facility] Thyroid panel with TSH (92705, 23662)  (Send-Out)                  Charge Capture:         Primary Diagnosis:     I10  Essential (primary) hypertension           Orders:      90347  Office/outpatient visit; established patient, level 4  (In-House)            1101F  Pt screen for fall risk; document no falls in past year or only 1 fall w/o injury in past year (BEST)  (In-House)            3017F  Colorectal CA screen results documented and reviewed (PV)  (In-House)              E03.8  Other specified hypothyroidism     G50.0  Trigeminal neuralgia     R94.4  Abnormal results of kidney function studies     E78.4  Other hyperlipidemia

## 2021-05-18 NOTE — PROGRESS NOTES
Tish Urias 1945     Office/Outpatient Visit    Visit Date: Mon, Mar 25, 2019 02:49 pm    Provider: Samir Coto MD (Assistant: Meghan Flynn RN)    Location: [facility]        Electronically signed by Samir Coto MD on  03/27/2019 04:41:33 PM                             SUBJECTIVE:        CC: shoulder pain         HPI:     Tish injured her shoulder in February.  She says that she was lifting and something slipped from overhead and the shoulder moved awkwardly.  She had immediate pain and heard or felt a pop or snap.  She is very concerned that she may have some injury.  She is having very significant sharp pain over the lateral shoulder.  It can hit her out of the blue.  It comes and goes spontaneously.  She has been taking ibuprofen for this.  She has been on a couple of rounds of it.  She is already on meloxicam for this.  She is not sure how to proceed.         Additionally, she presents with history of hypertension.  her current cardiac medication regimen includes a beta-blocker ( Tenormin ).  She is tolerating the medication well without side effects.  Compliance with treatment has been good; she takes her medication as directed.          Concerning acquired hypothyroidism, she is currently taking Synthroid, 88 mcg daily.  TSH was last checked more than 6 months ago.  The result was reported as normal.          She also has trigeminal neuralgia and remains on Tegretol.  She is doing okay with this.     ROS:     CONSTITUTIONAL:  Negative for chills and fever.      CARDIOVASCULAR:  Negative for chest pain and palpitations.      RESPIRATORY:  Negative for recent cough and dyspnea.      GASTROINTESTINAL:  Negative for abdominal pain, nausea and vomiting.          PMH/FMH/SH:     Last Reviewed on 3/25/2019 02:57 PM by Samir Coto    Past Medical History:                 PAST MEDICAL HISTORY         Hypertension: controlled;     Hypothyroidism: dx'd at age 61;     Iron  Deficiency Anemia: CEASED AT MENOPAUSE;     Allergies: perennial;     NEAR SIGHTED         GYNECOLOGICAL HISTORY:     miscarriage 1    Menopause at age 57.  Hospitalizations: Never         CURRENT MEDICAL PROVIDERS:    GENERAL FAMILY  , IN [facility] , SHE WILL HAVE RECORDS SENT             ADVANCE DIRECTIVES: Living will on file         PREVENTIVE HEALTH MAINTENANCE             COLORECTAL CANCER SCREENING: Up to date (colonoscopy q10y; sigmoidoscopy q5y; Cologuard q3y) was last done 2012, Results are in chart; colonoscopy with normal results     MAMMOGRAM: Done within last 2 years and results in are chart was last done 18 with normal results         Surgical History:         Positive for    Tonsillectomy + Adenoidectomy; at age AS CHILD;     Positive for    Varicose Vein Stripping ;     Positive for    CARPEL TUNNEL REPAIR , ;;     Negative for    Colonoscopy and    Treadmill stress test;         Family History:     Father: Cause of death was ACCIDENTAL DEATH     Mother: Healthy;  Hypertension     Brother(s): Healthy; 1 brother(s) total     Sister(s): Healthy; 2 sister(s) total     Paternal Grandfather: Alcoholism     Paternal Grandmother: Medical history unknown;      Maternal Grandfather: Healthy;   at age 82     Maternal Grandmother:  at age 60; Cause of death was anurysm         Social History:     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          Alcohol:  Does not drink alcohol and never has.          Substance Abuse History:     Last Reviewed on 3/25/2019 02:57 PM by Samir Coto        Mental Health History:     Last Reviewed on 3/25/2019 02:57 PM by Samir Coto        Communicable Diseases (eg STDs):     Last Reviewed on 3/25/2019  02:57 PM by Samir Coto            Current Problems:     Last Reviewed on 3/25/2019 02:57 PM by Samir Coto    Hypercholesterolemia     Use of high risk medications     Acquired hypothyroidism     Hypertension     Acute maxillary sinusitis     Joint pain, shoulder region     Trigeminal neuralgia         Immunizations:     Zostavax (Zoster live) 10/18/2012         Allergies:     Last Reviewed on 3/25/2019 02:57 PM by Samir Coto    Penicillins:        Current Medications:     Last Reviewed on 3/25/2019 02:57 PM by Samir Coto    Atenolol 50mg Tablet Take 1 tablet(s) by mouth daily     Carbamazepine 100mg Capsules, Extended Release Take 1 capsule(s) by mouth bid     Mobic 15mg Tablet Take 1 tablet(s) by mouth daily with food.     Omeprazole 20mg Capsules, Extended Release Take 1 capsule(s) by mouth daily     Synthroid 0.088mg Tablet Take 1 tablet(s) by mouth daily     Singulair  10mg Tablet Take 1 tablet(s) by mouth each evening     Latanoprost 0.005% Ophthalmic Solution Instill 1 drop(s) into both eye(s) each  evening     Lotemax 0.5% Ophthalmic Gel QID PRN         OBJECTIVE:        Vitals:         Current: 3/25/2019 2:54:34 PM    Ht:  5 ft, 2.25 in;  Wt: 158.6 lbs;  BMI: 28.8    T: 98 F (oral);  BP: 134/80 mm Hg (left arm, sitting);  P: 68 bpm (left arm (BP Cuff), sitting);  sCr: 1.1 mg/dL;  GFR: 46.99        Exams:     PHYSICAL EXAM:     GENERAL: vital signs recorded - well developed, well nourished;  no apparent distress;     NECK: range of motion is normal; thyroid is non-palpable;     RESPIRATORY: normal respiratory rate and pattern with no distress; normal breath sounds with no rales, rhonchi, wheezes or rubs;     CARDIOVASCULAR: normal rate; rhythm is regular;  no systolic murmur; no edema;     GASTROINTESTINAL: nontender; normal bowel sounds; no organomegaly;     BREAST/INTEGUMENT: SKIN: no significant rashes or lesions; no suspicious moles;     MUSCULOSKELETAL:  there is not any focal tenderness in the L shoulder - there is decreased ROM when she reaches behind her;         ASSESSMENT           719.41   M25.512  Joint pain, shoulder region              DDx:     401.1   I10  Hypertension              DDx:     244.8   E03.8  Acquired hypothyroidism              DDx:     350.1   G50.0  Trigeminal neuralgia              DDx:         ORDERS:         Meds Prescribed:       Refill of: Atenolol 50mg Tablet Take 1 tablet(s) by mouth daily  #90 (Ninety) tablet(s) Refills: 1       Refill of: Carbamazepine 100mg Capsules, Extended Release Take 1 capsule(s) by mouth bid  #180 (One Austin and Eighty) capsule(s) Refills: 1       Refill of: Mobic (Meloxicam) 15mg Tablet Take 1 tablet(s) by mouth daily with food.  #90 (Ninety) tablet(s) Refills: 1       Refill of: Omeprazole 20mg Capsules, Extended Release Take 1 capsule(s) by mouth daily  #90 (Ninety) capsule(s) Refills: 1       Refill of: Synthroid (Levothyroxine Sodium) 0.088mg Tablet Take 1 tablet(s) by mouth daily  #90 (Ninety) tablet(s) Refills: 1         Radiology/Test Orders:       [id number]  Left Radiologic exam, shoulder; comp, 2 views  (Send-Out)         3014F  Screening mammography results documented and reviewed (PV)1  (In-House)         3017F  Colorectal CA screen results documented and reviewed (PV)  (In-House)           Lab Orders:       [id number]  TSH - H TSH  (Send-Out)         [id number]  BDCB2 - [facility] CBC w/o diff  (Send-Out)         34325  COMP - [facility] Comp. Metabolic Panel  (Send-Out)           Other Orders:         Calculated BMI above the upper parameter and a follow-up plan was documented in the medical record  (In-House)                   PLAN:          Joint pain, shoulder region         RADIOLOGY:  I have ordered Shoulder x-ray: left shoulder to be done today.      RECOMMENDATIONS given include: This has lingered for her.  I'm going to get an X-ray and see what that shows.  I'm probably going to consider MRI as she is  already on NSAID therapy.  We will be in touch with her tomorrow..  MIPS     MAMMOGRAM: Done within last 2 years and results in are chart     COLORECTAL CANCER SCREENING: Results are in chart     BMI Elevated - Follow-Up Plan: She was provided education on weight loss strategies           Orders:       [id number]  Left Radiologic exam, shoulder; comp, 2 views  (Send-Out)         3014F  Screening mammography results documented and reviewed (PV)1  (In-House)         3017F  Colorectal CA screen results documented and reviewed (PV)  (In-House)           Calculated BMI above the upper parameter and a follow-up plan was documented in the medical record  (In-House)             Patient Education Handouts:       Rotator Cuff Tear of the Shoulder           Hypertension           Prescriptions:       Refill of: Atenolol 50mg Tablet Take 1 tablet(s) by mouth daily  #90 (Ninety) tablet(s) Refills: 1          Acquired hypothyroidism     LABORATORY:  Labs ordered to be performed today include TSH.            Prescriptions:       Refill of: Synthroid (Levothyroxine Sodium) 0.088mg Tablet Take 1 tablet(s) by mouth daily  #90 (Ninety) tablet(s) Refills: 1           Orders:       [id number]  TSH - [facility] TSH  (Send-Out)            Trigeminal neuralgia     LABORATORY:  Labs ordered to be performed today include CBC W/O DIFF and Comprehensive metabolic panel.            Prescriptions:       Refill of: Carbamazepine 100mg Capsules, Extended Release Take 1 capsule(s) by mouth bid  #180 (One Rogers and Eighty) capsule(s) Refills: 1           Orders:       [id number]  BDCB2 - [facility] CBC w/o diff  (Send-Out)         19095  COMP - [facility] Comp. Metabolic Panel  (Send-Out)               Other Prescriptions:       Refill of: Mobic (Meloxicam) 15mg Tablet Take 1 tablet(s) by mouth daily with food.  #90 (Ninety) tablet(s) Refills: 1       Refill of: Omeprazole 20mg Capsules, Extended Release Take 1 capsule(s) by mouth daily  #90 (Ninety) capsule(s) Refills: 1          CHARGE CAPTURE           **Please note: ICD descriptions below are intended for billing purposes only and may not represent clinical diagnoses**        Primary Diagnosis:         719.41 Joint pain, shoulder region            M25.512    Pain in left shoulder              Orders:          [id number]   Office/outpatient visit; established patient, level 4  (In-House)             3014F   Screening mammography results documented and reviewed (PV)1  (In-House)             3017F   Colorectal CA screen results documented and reviewed (PV)  (In-House)                Calculated BMI above the upper parameter and a follow-up plan was documented in the medical record  (In-House)           401.1 Hypertension            I10    Essential (primary) hypertension    244.8 Acquired hypothyroidism            E03.8    Other specified hypothyroidism    350.1 Trigeminal neuralgia            G50.0    Trigeminal neuralgia

## 2021-05-18 NOTE — PROGRESS NOTES
Tish Urias  1945     Office/Outpatient Visit    Visit Date: Thu, Jun 11, 2020 01:29 pm    Provider: Samir Coto MD (Assistant: Meghan Flynn RN)    Location: [facility]        Electronically signed by Samir Coto MD on  06/12/2020 05:36:58 AM                             Subjective:        CC: blood pressure, thyroid        TELEMEDICINE VISIT:    - Tish consented to this telemedicine visit.    - Persons present during the telemedicine consultation include:  Tish - patient, Dr. Coto    - This visit is being conducted over FaceTime with audio and video.    HPI:           Patient presents with essential (primary) hypertension.  Her current cardiac medication regimen includes a beta-blocker ( Tenormin ).  Ms. Urias does not check her blood pressure other than at her clinic appointments.  She is tolerating the medication well without side effects.  Compliance with treatment has been good; she takes her medication as directed.            In regard to the other specified hypothyroidism, she is currently taking Synthroid, 88 mcg daily.  TSH was last checked more than 6 months ago.  The result was reported as normal.            She also has history of trigeminal neuralgia and remains on Tegretol for this.  She did try previously to stop that and was not able to stop it.  She currently takes this regularly and has not had any issues.    ROS:     CONSTITUTIONAL:  Negative for chills and fever.      CARDIOVASCULAR:  Negative for chest pain and palpitations.      RESPIRATORY:  Negative for recent cough and dyspnea.      GASTROINTESTINAL:  Negative for abdominal pain, nausea and vomiting.      INTEGUMENTARY/BREAST:  Negative for atypical mole(s) and rash.          Past Medical History / Family History / Social History:         Last Reviewed on 6/11/2020 02:06 PM by Samir Coto    Past Medical History:                 PAST MEDICAL HISTORY         Hypertension: controlled;      Hypothyroidism: dx'd at age 61;     Iron Deficiency Anemia: CEASED AT MENOPAUSE;     Allergies: perennial;     NEAR SIGHTED         GYNECOLOGICAL HISTORY:     miscarriage 1    Menopause at age 57.  Hospitalizations: Never             ADVANCE DIRECTIVES: Living will on file - Her  and/or children would make decisions if needed.         PREVENTIVE HEALTH MAINTENANCE             BONE DENSITY: was last done 10/7/19 with the following abnormality noted-- Osteopenia     COLORECTAL CANCER SCREENING: Up to date (colonoscopy q10y; sigmoidoscopy q5y; Cologuard q3y) was last done 2012, Results are in chart; colonoscopy with normal results     MAMMOGRAM: Done within last 2 years and results in are chart was last done 10/8/19 with normal results         Surgical History:         Positive for    Tonsillectomy + Adenoidectomy; at age AS CHILD;     Positive for    Varicose Vein Stripping ;     Positive for    CARPEL TUNNEL REPAIR , ;;     Negative for    Colonoscopy and    Treadmill stress test;     Cholecystectomy: laparoscopic; ;     Procedures:    EGD ( 2017 )         Family History:     Father: Cause of death was ACCIDENTAL DEATH     Mother: Healthy;  Hypertension     Brother(s): Healthy; 1 brother(s) total     Sister(s): Healthy; 2 sister(s) total     Paternal Grandfather: Alcoholism     Paternal Grandmother: Medical history unknown;      Maternal Grandfather: Healthy;   at age 82     Maternal Grandmother:  at age 60; Cause of death was anurysm         Social History:     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          Alcohol:  Does not drink alcohol and never has.          Substance Abuse History:     Last Reviewed on 2020 02:06 PM by Samir Coto  [place]        Mental Health History:     Last Reviewed on 6/11/2020 02:06 PM by Samir Coto        Communicable Diseases (eg STDs):     Last Reviewed on 6/11/2020 02:06 PM by Samir Coto        Current Problems:     Last Reviewed on 6/11/2020 02:06 PM by Samir Coto    Essential (primary) hypertension    Other specified hypothyroidism    Other hyperlipidemia    Trigeminal neuralgia    Pain in right shoulder    Pain in left shoulder        Immunizations:     Adacel (Tdap) 2/21/2019    Zostavax (Zoster live) 10/18/2012        Allergies:     Last Reviewed on 6/11/2020 02:06 PM by Samir Coto    Penicillins:          Current Medications:     Last Reviewed on 6/11/2020 02:06 PM by Samir Coto    atenoloL 50 mg oral tablet [Take 1 tablet by mouth once daily]    Carbamazepine 100 mg oral Capsule, Extended Release Multiphase 12 hr [Take 1 capsule(s) by mouth bid]    Mobic 15mg Tablet [Take 1 tablet(s) by mouth daily with food.]    Euthyrox 88 mcg oral tablet [Take 1 tablet by mouth once daily]    Omeprazole 20 mg oral capsule,delayed release (enteric coated) [Take 1 capsule(s) by mouth daily]    Singulair  10mg Tablet [Take 1 tablet(s) by mouth each evening]    Latanoprost 0.005 % ophthalmic (eye) Drops [Instill 1 drop(s) into both eye(s) each  evening]    Lotemax 0.5 % ophthalmic (eye) Drops, Gel [QID PRN]    loratadine 10 mg oral tablet [take 1 tablet (10 mg) by oral route once daily]    fluticasone propionate 50 mcg/actuation Intranasal Spray, Suspension [inhale 1 spray (50 mcg) in each nostril by intranasal route prn]        Objective:        Exams:     PHYSICAL EXAM:     GENERAL: well developed, well nourished;  no apparent distress;     EYES: extraocular movements intact; conjunctiva and cornea are normal;     RESPIRATORY: normal respiratory rate and pattern with no distress;     NEUROLOGIC: mental status: alert and oriented x 3; cranial nerves II-XII grossly  intact;     PSYCHIATRIC: appropriate affect and demeanor; normal psychomotor function;         Assessment:         I10   Essential (primary) hypertension       E03.8   Other specified hypothyroidism       G50.0   Trigeminal neuralgia           ORDERS:         Meds Prescribed:       [Refilled] Carbamazepine 100 mg oral Capsule, Extended Release Multiphase 12 hr [Take 1 capsule(s) by mouth bid], #180 (one hundred and eighty) capsules, Refills: 1 (one)       [Refilled] Mobic 15 mg oral tablet [Take 1 tablet(s) by mouth daily with food.], #90 (ninety) tablets, Refills: 1 (one)       [Refilled] Omeprazole 20 mg oral capsule,delayed release (enteric coated) [Take 1 capsule(s) by mouth daily], #90 (ninety) capsules, Refills: 1 (one)       [Refilled] atenoloL 50 mg oral tablet [Take 1 tablet by mouth once daily], #90 (ninety) tablets, Refills: 1 (one)       [Refilled] Euthyrox 88 mcg oral tablet [take 1 tablet (88 mcg) by oral route once daily], #90 (ninety) tablets, Refills: 1 (one)         Radiology/Test Orders:       3017F  Colorectal CA screen results documented and reviewed (PV)  (In-House)              Lab Orders:       [id number]  TSH - [facility] TSH  (Send-Out)            31832  BDCB2 - [facility] CBC w/o diff  (Send-Out)            48376  COMP - [facility] Comp. Metabolic Panel  (Send-Out)            18762  TEG - [facility] Carbamazepine (Tegretol)  (Send-Out)              Other Orders:       1101F  Pt screen for fall risk; document no falls in past year or only 1 fall w/o injury in past year (BEST)  (In-House)              Screening mammogram results documented  (Send-Out)            1123F  Advance Care Planning discussed and doc; advance care plan or surrogate decision maker doc. in MR  (Send-Out)                      Plan:         Essential (primary) hypertension        RECOMMENDATIONS given include: Today, we have reviewed her care.  I'm going to recommend no changes for now.  We will update labs and go from there.  We will call her in  about 3 weeks for labs..  MIPS Has had no falls or only one fall without injury in the past year Screening mammomgram done within last 2 years and results in are chart Colorectal Cancer Screening is up to date and the results are in the chart ACP discussion: Advance Directive/Surrogate Decision Maker discussed and scanned into chart           Prescriptions:       [Refilled] Carbamazepine 100 mg oral Capsule, Extended Release Multiphase 12 hr [Take 1 capsule(s) by mouth bid], #180 (one hundred and eighty) capsules, Refills: 1 (one)       [Refilled] Mobic 15 mg oral tablet [Take 1 tablet(s) by mouth daily with food.], #90 (ninety) tablets, Refills: 1 (one)       [Refilled] Omeprazole 20 mg oral capsule,delayed release (enteric coated) [Take 1 capsule(s) by mouth daily], #90 (ninety) capsules, Refills: 1 (one)       [Refilled] atenoloL 50 mg oral tablet [Take 1 tablet by mouth once daily], #90 (ninety) tablets, Refills: 1 (one)       [Refilled] Euthyrox 88 mcg oral tablet [take 1 tablet (88 mcg) by oral route once daily], #90 (ninety) tablets, Refills: 1 (one)           Orders:       1101F  Pt screen for fall risk; document no falls in past year or only 1 fall w/o injury in past year (BEST)  (In-House)              Screening mammogram results documented  (Send-Out)            3017F  Colorectal CA screen results documented and reviewed (PV)  (In-House)            1123F  Advance Care Planning discussed and doc; advance care plan or surrogate decision maker doc. in MR  (Send-Out)              Other specified hypothyroidism    LABORATORY:  Labs ordered to be performed today include TSH.            Orders:       [id number]  TSH - [facility] TSH  (Send-Out)              Trigeminal neuralgia    LABORATORY:  Labs ordered to be performed today include CBC W/O DIFF and Comprehensive metabolic panel.            Orders:       [id number]  BDCB2 - [facility] CBC w/o diff  (Send-Out)            47914  COMP - H Comp. Metabolic Panel  (Send-Out)             26820  [facility] Carbamazepine (Tegretol)  (Send-Out)                  Charge Capture:         Primary Diagnosis:     I10  Essential (primary) hypertension           Orders:      [id number]  Office/outpatient visit; established patient, level 4  (In-House)            1101F  Pt screen for fall risk; document no falls in past year or only 1 fall w/o injury in past year (BEST)  (In-House)            3017F  Colorectal CA screen results documented and reviewed (PV)  (In-House)              E03.8  Other specified hypothyroidism     G50.0  Trigeminal neuralgia

## 2021-05-18 NOTE — PROGRESS NOTES
Tish Urias 1945     Office/Outpatient Visit    Visit Date: Thu, Sep 26, 2019 08:57 am    Provider: Samir Coto MD (Assistant: Meghan Flynn RN)    Location: [facility]        Electronically signed by Samir Coto MD on  09/27/2019 08:02:43 AM                             SUBJECTIVE:        CC: physical exam, blood pressure, thyroid         HPI:         Ms. Urias is here for a Medicare wellness visit.  The required HRA questions are integrated within this visit note. Family medical history and individual medical/surgical history were reviewed and updated.  A current height, weight, BMI, blood pressure, and pulse were recorded in the vitals section of the note and have been reviewed. Patient's medications, including supplements, were recorded in the chart and reviewed.  Current providers and suppliers were reviewed and updated.          Self-Assessment of Health: She rates her health as very good. She rates her confidence of being able to control/manage most of her health problems as very confident. Her physical/emotional health has limited her social activites not at all.  A review of cognitive impairment was performed, including ability to drive a car, manage finances, and any memory changes, and was found to be negative.  A review of functional ability, including bathing, dressing, walking, and urine/bowel continence as well as level of safety was performed and was found to be negative.  Falls Risk: Has not had any falls or only one fall without injury in the past year.  She denies having trouble hearing the TV/radio when others do not, having to strain to hear or understand conversations and wearing hearing aid(s).  Concerning home safety, she reports that at home she DOES have adequate lighting, a skid resistant shower/tub, handrails on stairs, functioning smoke alarms and absence of throw rugs, but not grab bars in the bath.  Physical Activity: She never excercises.; Type of diet  patient normally eats is described as well-balanced with fruits and vegetables         Concerning hypertension, her current cardiac medication regimen includes a beta-blocker ( Tenormin ).  Ms. Urias does not check her blood pressure other than at her clinic appointments.  She is tolerating the medication well without side effects.  Compliance with treatment has been good; she takes her medication as directed.          Additionally, she presents with history of acquired hypothyroidism.  she is currently taking Synthroid, 88 mcg daily.  TSH was last checked 6 months ago.  The result was reported as normal.          She does have history of trigeminal neuralgia and remains on Tegretol for this.  She did try previously to stop that and was not able to stop it.     ROS:     CONSTITUTIONAL:  Negative for chills and fever.      CARDIOVASCULAR:  Negative for chest pain and palpitations.      RESPIRATORY:  Negative for recent cough and dyspnea.      GASTROINTESTINAL:  Negative for abdominal pain, nausea and vomiting.      INTEGUMENTARY/BREAST:  Negative for atypical mole(s) and rash.          PMH/FMH/SH:     Last Reviewed on 2019 09:26 AM by Samir Coto    Past Medical History:                 PAST MEDICAL HISTORY         Hypertension: controlled;     Hypothyroidism: dx'd at age 61;     Iron Deficiency Anemia: CEASED AT MENOPAUSE;     Allergies: perennial;     NEAR SIGHTED         GYNECOLOGICAL HISTORY:     miscarriage 1    Menopause at age 57.  Hospitalizations: Never             ADVANCE DIRECTIVES: Living will on file - Her  and/or children would make decisions if needed.         PREVENTIVE HEALTH MAINTENANCE             COLORECTAL CANCER SCREENING: Up to date (colonoscopy q10y; sigmoidoscopy q5y; Cologuard q3y) was last done 2012, Results are in chart; colonoscopy with normal results     MAMMOGRAM: Done within last 2 years and results in are chart was last done 18 with normal results          Surgical History:         Positive for    Tonsillectomy + Adenoidectomy; at age AS CHILD;     Positive for    Varicose Vein Stripping ;     Positive for    CARPEL TUNNEL REPAIR , ;;     Negative for    Colonoscopy and    Treadmill stress test;     Cholecystectomy: laparoscopic; ;     Procedures:    EGD (  )         Family History:     Father: Cause of death was ACCIDENTAL DEATH     Mother: Healthy;  Hypertension     Brother(s): Healthy; 1 brother(s) total     Sister(s): Healthy; 2 sister(s) total     Paternal Grandfather: Alcoholism     Paternal Grandmother: Medical history unknown;      Maternal Grandfather: Healthy;   at age 82     Maternal Grandmother:  at age 60; Cause of death was anurysm         Social History:     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          Alcohol:  Does not drink alcohol and never has.          Substance Abuse History:     Last Reviewed on 2019 09:26 AM by Samir Coto        Mental Health History:     Last Reviewed on 2019 09:26 AM by Samir Coto        Communicable Diseases (eg STDs):     Last Reviewed on 2019 09:26 AM by Samir Coto            Current Problems:     Last Reviewed on 2019 09:26 AM by Samir Coto    Hypercholesterolemia     Use of high risk medications     Acquired hypothyroidism     Hypertension     Joint pain, shoulder region     Trigeminal neuralgia         Immunizations:     Adacel (Tdap) 2019     Zostavax (Zoster live) 10/18/2012         Allergies:     Last Reviewed on 2019 09:26 AM by Samir Coto    Penicillins:        Current Medications:     Last Reviewed on 2019 09:26 AM by Samir Coto    Atenolol 50mg  breastfeeding exclusively Tablet Take 1 tablet(s) by mouth daily     Carbamazepine 100mg Capsules, Extended Release Take 1 capsule(s) by mouth bid     Mobic 15mg Tablet Take 1 tablet(s) by mouth daily with food.     Omeprazole 20mg Capsules, Extended Release Take 1 capsule(s) by mouth daily     Synthroid 0.088mg Tablet Take 1 tablet(s) by mouth daily     Singulair  10mg Tablet Take 1 tablet(s) by mouth each evening     Latanoprost 0.005% Ophthalmic Solution Instill 1 drop(s) into both eye(s) each  evening     Lotemax 0.5% Ophthalmic Gel QID PRN         OBJECTIVE:        Vitals:         Current: 9/26/2019 9:01:53 AM    Ht:  5 ft, 2.25 in;  Wt: 157.8 lbs;  BMI: 28.6    T: 98.3 F (oral);  BP: 138/82 mm Hg (left arm, sitting);  P: 69 bpm (left arm (BP Cuff), sitting);  sCr: 1.08 mg/dL;  GFR: 47.76        Exams:     PHYSICAL EXAM:     GENERAL: vital signs recorded - well developed, well nourished;  no apparent distress;     EYES: extraocular movements intact; conjunctiva and cornea are normal; PERRL; binocular vision is 20/20 - hearing is normal     E/N/T:  normal EACs, TMs, nasal/oral mucosa, teeth, gingiva, and oropharynx;     NECK: range of motion is normal; thyroid is non-palpable;     RESPIRATORY: normal respiratory rate and pattern with no distress; normal breath sounds with no rales, rhonchi, wheezes or rubs;     CARDIOVASCULAR: normal rate; rhythm is regular;  no systolic murmur; no edema;     GASTROINTESTINAL: nontender; normal bowel sounds; no organomegaly;     LYMPHATIC: no enlargement of cervical or facial nodes; no supraclavicular nodes;     BREAST/INTEGUMENT: SKIN: no significant rashes or lesions; no suspicious moles;     NEUROLOGIC: mental status: alert and oriented x 3; cranial nerves II-XII grossly intact;     PSYCHIATRIC: appropriate affect and demeanor; normal psychomotor function;         ASSESSMENT           V70.0   Z00.01  Yearly physical exam              DDx:     401.1   I10  Hypertension              DDx:     244.8    E03.8  Acquired hypothyroidism              DDx:     350.1   G50.0  Trigeminal neuralgia              DDx:     272.0   E78.4  Hypercholesterolemia              DDx:         ORDERS:         Meds Prescribed:       Refill of: Atenolol 50mg Tablet Take 1 tablet(s) by mouth daily  #90 (Ninety) tablet(s) Refills: 1       Refill of: Carbamazepine 100mg Capsules, Extended Release Take 1 capsule(s) by mouth bid  #180 (One [place] and Eighty) capsule(s) Refills: 1       Refill of: Mobic (Meloxicam) 15mg Tablet Take 1 tablet(s) by mouth daily with food.  #90 (Ninety) tablet(s) Refills: 1       Refill of: Omeprazole 20mg Capsules, Extended Release Take 1 capsule(s) by mouth daily  #90 (Ninety) capsule(s) Refills: 1       Refill of: Synthroid (Levothyroxine Sodium) 0.088mg Tablet Take 1 tablet(s) by mouth daily  #90 (Ninety) tablet(s) Refills: 1         Radiology/Test Orders:       3014F  Screening mammography results documented and reviewed (PV)1  (In-House)         3017F  Colorectal CA screen results documented and reviewed (PV)  (In-House)           Lab Orders:       [id number]  TEG - H Carbamazepine (Tegretol)  (Send-Out)         35639  BDCB2 - [facility] CBC w/o diff  (Send-Out)         02329  HTNLP - [facility] CMP AND LIPID: [id number]  (Send-Out)         95909  TSH - [facility] TSH  (Send-Out)           Other Orders:         Depression screen negative  (In-House)         1101F  Pt screen for fall risk; document no falls in past year or only 1 fall w/o injury in past year (BEST)  (In-House)           Subsequent Annual Well Visit Medicare (x1)                 PLAN:          Yearly physical exam         RECOMMENDATIONS given include: Tish is doing well at this time.  Please see attached wellness recommendations.  We will refill her medications and arrange testing as noted.  She is in good health..  MIPS PHQ-9 Depression Screening: Completed form scanned and in chart; Total Score 0; Negative Depression Screen           Orders:          Depression screen negative  (In-House)         1101F  Pt screen for fall risk; document no falls in past year or only 1 fall w/o injury in past year (BEST)  (In-House)         3014F  Screening mammography results documented and reviewed (PV)1  (In-House)         3017F  Colorectal CA screen results documented and reviewed (PV)  (In-House)             Patient Education Handouts:       Physical Exam 60+ year, Female           Hypertension As above.           Prescriptions:       Refill of: Atenolol 50mg Tablet Take 1 tablet(s) by mouth daily  #90 (Ninety) tablet(s) Refills: 1          Acquired hypothyroidism     LABORATORY:  Labs ordered to be performed today include TSH.            Prescriptions:       Refill of: Synthroid (Levothyroxine Sodium) 0.088mg Tablet Take 1 tablet(s) by mouth daily  #90 (Ninety) tablet(s) Refills: 1           Orders:       [id number]  TSH - [facility] TSH  (Send-Out)            Trigeminal neuralgia     LABORATORY:  Labs ordered to be performed today include CBC W/O DIFF.            Prescriptions:       Refill of: Carbamazepine 100mg Capsules, Extended Release Take 1 capsule(s) by mouth bid  #180 (One [place] and Eighty) capsule(s) Refills: 1           Orders:       [id number]  TEG - [facility] Carbamazepine (Tegretol)  (Send-Out)         87516  BDCB2 - H CBC w/o diff  (Send-Out)            Hypercholesterolemia     LABORATORY:  Labs ordered to be performed today include HTN/Lipid Panel: CMP, Lipid.            Orders:       [id number]  HTNLP - [facility] CMP AND LIPID: [id number], [id number]  (Send-Out)               Other Prescriptions:       Refill of: Mobic (Meloxicam) 15mg Tablet Take 1 tablet(s) by mouth daily with food.  #90 (Ninety) tablet(s) Refills: 1       Refill of: Omeprazole 20mg Capsules, Extended Release Take 1 capsule(s) by mouth daily  #90 (Ninety) capsule(s) Refills: 1         CHARGE CAPTURE           **Please note: ICD descriptions below are intended for billing purposes only and may not represent clinical  diagnoses**        Primary Diagnosis:         V70.0 Yearly physical exam            Z00.01    Encounter for general adult medical examination with abnormal findings              Orders:          [id number]   Preventive medicine, established patient, age 65+ years  (In-House)                                           Subsequent Annual Well Visit Medicare (x1)              Depression screen negative  (In-House)             1101F   Pt screen for fall risk; document no falls in past year or only 1 fall w/o injury in past year (BEST)  (In-House)             3014F   Screening mammography results documented and reviewed (PV)1  (In-House)             3017F   Colorectal CA screen results documented and reviewed (PV)  (In-House)           401.1 Hypertension            I10    Essential (primary) hypertension              Orders:          34252 -25  Office/outpatient visit; established patient, level 4  (In-House)           244.8 Acquired hypothyroidism            E03.8    Other specified hypothyroidism    350.1 Trigeminal neuralgia            G50.0    Trigeminal neuralgia    272.0 Hypercholesterolemia            E78.4    Other hyperlipidemia

## 2021-05-18 NOTE — PROGRESS NOTES
Tish Urias 1945     Office/Outpatient Visit    Visit Date: Mon, Mar 26, 2018 10:29 am    Provider: Samir Coto MD     Location: [facility]        Electronically signed by Samir Coto MD on  03/27/2018 06:40:36 AM                             SUBJECTIVE:        CC: blood pressure, thyroid, trigeminal neuralgia, shoulder pain         HPI:         Ms. Urias presents with hypertension.  Her current cardiac medication regimen includes a beta-blocker ( Tenormin ).  She is tolerating the medication well without side effects.  Compliance with treatment has been good; she takes her medication as directed.          In regard to the hypercholesterolemia, current treatment includes a low cholesterol/low fat diet.  Compliance with treatment has been good.          Dx with acquired hypothyroidism; she is currently taking Synthroid, 88 mcg daily.  TSH was last checked more than 6 months ago.          Tish also trigimenal neuralgia for which she remains on Tegretol.  She has tried to cut back on this medication, but she has recurrent pain when she does.  She is without acute issue otherwise.         Tish has also been having R shoulder and arm pain since the middle portion of this month.  She says that she has been doing quite a bit and thinks that she flared things up.     ROS:     CONSTITUTIONAL:  Negative for chills and fever.      CARDIOVASCULAR:  Negative for chest pain and palpitations.      RESPIRATORY:  Negative for recent cough and dyspnea.      GASTROINTESTINAL:  Negative for abdominal pain, nausea and vomiting.      INTEGUMENTARY/BREAST:  Negative for atypical mole(s) and rash.          PMH/FMH/SH:     Last Reviewed on 3/26/2018 10:31 AM by Samir Coto    Past Medical History:                 PAST MEDICAL HISTORY         Hypertension: controlled;     Hypothyroidism: dx'd at age 61;     Iron Deficiency Anemia: CEASED AT MENOPAUSE;     Allergies: perennial;     NEAR SIGHTED          GYNECOLOGICAL HISTORY:     miscarriage 1    Menopause at age 57.  Hospitalizations: Never         CURRENT MEDICAL PROVIDERS:    GENERAL FAMILY  , IN [facility] , SHE WILL HAVE RECORDS SENT             ADVANCE DIRECTIVES: Living will on file         PREVENTIVE HEALTH MAINTENANCE             COLORECTAL CANCER SCREENING: Up to date (colonoscopy q10y; sigmoidoscopy q5y; Cologuard q3y) was last done 2012, Results are in chart; colonoscopy with normal results     MAMMOGRAM: Done within last 2 years and results in are chart was last done 2016 with normal results         Surgical History:         Positive for    Tonsillectomy + Adenoidectomy; at age AS CHILD;     Positive for    Varicose Vein Stripping ;     Positive for    CARPEL TUNNEL REPAIR , ;;     Negative for    Colonoscopy and    Treadmill stress test;         Family History:     Father: Cause of death was ACCIDENTAL DEATH     Mother: Healthy;  Hypertension     Brother(s): Healthy; 1 brother(s) total     Sister(s): Healthy; 2 sister(s) total     Paternal Grandfather: Alcoholism     Paternal Grandmother: Medical history unknown;      Maternal Grandfather: Healthy;   at age 82     Maternal Grandmother:  at age 60; Cause of death was anurysm         Social History:     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          Alcohol:  Does not drink alcohol and never has.          Substance Abuse History:     Last Reviewed on 3/26/2018 10:31 AM by Samir Coto        Mental Health History:     Last Reviewed on 3/26/2018 10:31 AM by Samir Coto        Communicable Diseases (eg STDs):     Last Reviewed on 3/26/2018 10:31 AM by Samir Coto            Current Problems:     Last Reviewed on  3/26/2018 10:31 AM by Samir Coto    Hypercholesterolemia     Use of high risk medications     Acquired hypothyroidism     Hypertension     Joint pain, shoulder region     Trigeminal neuralgia         Immunizations:     Zostavax (Zoster) 10/18/2012         Allergies:     Last Reviewed on 3/26/2018 10:31 AM by Samir Coto    Penicillins:        Current Medications:     Last Reviewed on 3/26/2018 10:31 AM by Samir Coto    Atenolol 50mg Tablet Take 1 tablet(s) by mouth daily     Synthroid 0.088mg Tablet Take 1 tablet(s) by mouth daily     Carbamazepine 100mg Capsules, Extended Release Take 1 capsule(s) by mouth bid     Mobic 15mg Tablet Take 1 tablet(s) by mouth daily with food.     Omeprazole 20mg Capsules, Extended Release Take 1 capsule(s) by mouth daily     Singulair  10mg Tablet Take 1 tablet(s) by mouth each evening         OBJECTIVE:        Vitals:         Current: 3/26/2018 10:46:02 AM    Ht:  5 ft, 2.25 in;  Wt: 156.4 lbs;  BMI: 28.4    T: 98.6 F (oral);  BP: 166/90 mm Hg (right arm, sitting);  P: 66 bpm (right arm (BP Cuff), sitting);  sCr: 1 mg/dL;  GFR: 52.11        Exams:     PHYSICAL EXAM:     GENERAL: vital signs recorded - well developed, well nourished;  no apparent distress;     EYES: extraocular movements intact; conjunctiva and cornea are normal; PERRLA;     E/N/T:  normal EACs, TMs, nasal/oral mucosa, teeth, gingiva, and oropharynx;     NECK: range of motion is normal; thyroid is non-palpable;     RESPIRATORY: normal respiratory rate and pattern with no distress; normal breath sounds with no rales, rhonchi, wheezes or rubs;     CARDIOVASCULAR: normal rate; rhythm is regular;  no systolic murmur; no edema;     GASTROINTESTINAL: nontender; normal bowel sounds; no organomegaly;     BREAST/INTEGUMENT: SKIN: no significant rashes or lesions; no suspicious moles;     MUSCULOSKELETAL: there is some decreased ROM in the R shoulder due to pain - no concerning finding;          ASSESSMENT           401.1   I10  Hypertension              DDx:     272.0   E78.4  Hypercholesterolemia              DDx:     244.8   E03.8  Acquired hypothyroidism              DDx:     350.1   G50.0  Trigeminal neuralgia              DDx:     719.41   M25.511  Joint pain, shoulder region              DDx:         ORDERS:         Meds Prescribed:       Refill of: Atenolol 50mg Tablet Take 1 tablet(s) by mouth daily  #90 (Ninety) tablet(s) Refills: 1       Refill of: Synthroid (Levothyroxine Sodium) 0.088mg Tablet Take 1 tablet(s) by mouth daily  #90 (Ninety) tablet(s) Refills: 1       Refill of: Carbamazepine 100mg Capsules, Extended Release Take 1 capsule(s) by mouth bid  #180 (One [employer] and Eighty) capsule(s) Refills: 1       Refill of: Mobic (Meloxicam) 15mg Tablet Take 1 tablet(s) by mouth daily with food.  #90 (Ninety) tablet(s) Refills: 1       Refill of: Omeprazole 20mg Capsules, Extended Release Take 1 capsule(s) by mouth daily  #90 (Ninety) capsule(s) Refills: 1       Medrol (Methylprednisolone) 4mg Dosepak Take as directed with food  #1 (One) dose pack Refills: 0         Lab Orders:       [id number]  BDCB2 - [facility] CBC w/o diff  (Send-Out)         10039  TEG - H Carbamazepine (Tegretol)  (Send-Out)         [id number]  TSH - H TSH  (Send-Out)         49159  COMP - H Comp. Metabolic Panel  (Send-Out)                   PLAN:          Hypertension         RECOMMENDATIONS given include: Today, we have reviewed her care.  I suspect she has some inflammation - possibly tendonitis - in the shoulder.  We will give her a steroid pack to see if that will help calm it down.  She should take this with food and NOT take ibuprofen while on it.  Otherwise, her usual medications are filled.  Labs to be checked today except for the very high cholesterol for which she declines medication.  Risk of heart blockage is discussed..  I also recommended she work on range of motion with the shoulder for the near term.            Prescriptions:       Refill of: Atenolol 50mg Tablet Take 1 tablet(s) by mouth daily  #90 (Ninety) tablet(s) Refills: 1           Patient Education Handouts:       [facility]           Hypercholesterolemia     LABORATORY:  Labs ordered to be performed today include Comprehensive metabolic panel.            Orders:       [id number]  COMP - [facility] Comp. Metabolic Panel  (Send-Out)            Acquired hypothyroidism     LABORATORY:  Labs ordered to be performed today include TSH.            Prescriptions:       Refill of: Synthroid (Levothyroxine Sodium) 0.088mg Tablet Take 1 tablet(s) by mouth daily  #90 (Ninety) tablet(s) Refills: 1           Orders:       [id number]  TSH - [facility] TSH  (Send-Out)            Trigeminal neuralgia     LABORATORY:  Labs ordered to be performed today include CBC W/O DIFF.            Prescriptions:       Refill of: Carbamazepine 100mg Capsules, Extended Release Take 1 capsule(s) by mouth bid  #180 (One [place] and Eighty) capsule(s) Refills: 1           Orders:       [id number]  BDCB2 - [facility] CBC w/o diff  (Send-Out)         89874  TEG - [facility] Carbamazepine (Tegretol)  (Send-Out)            Joint pain, shoulder region As above.           Prescriptions:       Medrol (Methylprednisolone) 4mg Dosepak Take as directed with food  #1 (One) dose pack Refills: 0             Other Prescriptions:       Refill of: Mobic (Meloxicam) 15mg Tablet Take 1 tablet(s) by mouth daily with food.  #90 (Ninety) tablet(s) Refills: 1       Refill of: Omeprazole 20mg Capsules, Extended Release Take 1 capsule(s) by mouth daily  #90 (Ninety) capsule(s) Refills: 1         CHARGE CAPTURE           **Please note: ICD descriptions below are intended for billing purposes only and may not represent clinical diagnoses**        Primary Diagnosis:         401.1 Hypertension            I10    Essential (primary) hypertension              Orders:          [id number]   Office/outpatient visit; established patient, level 4  (In-House)            272.0 Hypercholesterolemia            E78.4    Other hyperlipidemia    244.8 Acquired hypothyroidism            E03.8    Other specified hypothyroidism    350.1 Trigeminal neuralgia            G50.0    Trigeminal neuralgia    719.41 Joint pain, shoulder region            M25.511    Pain in right shoulder

## 2021-05-18 NOTE — PROGRESS NOTES
Tish Urias 1945     Office/Outpatient Visit    Visit Date: Wed, Apr 10, 2019 01:48 pm    Provider: Gwen Colón N.P. (Assistant: Alyssa Jennings MA)    Location: [facility]        Electronically signed by Gwen Colón N.P. on  04/10/2019 02:17:48 PM                             SUBJECTIVE:        CC:     Ms. Urias is a 73 year old White female.  She presents with started with allergy like sxs x 5 days ago but then started a cough last night and congestion. Denies body aches, chills..          HPI:         Patient to be evaluated for upper respiratory illness.  These have been present for the past 5-6 days.  The symptoms include dry cough, nasal congestion, watery nasal discharge and maxillary sinus pain and pressure.  She denies body aches or headache.  She has already tried to relieve the symptoms with steroid nasal spray.  Medical history is significant for perennial allergies.      ROS:     CONSTITUTIONAL:  Negative for fever.      E/N/T:  Positive for scratchy throat.      CARDIOVASCULAR:  Negative for chest pain, palpitations, tachycardia, orthopnea, and edema.      RESPIRATORY:  Negative for cough, dyspnea, and hemoptysis.      MUSCULOSKELETAL:  Positive for left rotator surgery upcoming 19.          [facility]SH:     Last Reviewed on 4/10/2019 02:09 PM by Gwen Colón    Past Medical History:                 PAST MEDICAL HISTORY         Hypertension: controlled;     Hypothyroidism: dx'd at age 61;     Iron Deficiency Anemia: CEASED AT MENOPAUSE;     Allergies: perennial;     NEAR SIGHTED         GYNECOLOGICAL HISTORY:     miscarriage 1    Menopause at age 57.  Hospitalizations: Never         CURRENT MEDICAL PROVIDERS:    GENERAL FAMILY  , IN [facility] , SHE WILL HAVE RECORDS SENT             ADVANCE DIRECTIVES: Living will on file         PREVENTIVE HEALTH MAINTENANCE             COLORECTAL CANCER SCREENING: Up to date (colonoscopy q10y; sigmoidoscopy q5y;  Cologuard q3y) was last done 2012, Results are in chart; colonoscopy with normal results     MAMMOGRAM: Done within last 2 years and results in are chart was last done 18 with normal results         Surgical History:         Positive for    Tonsillectomy + Adenoidectomy; at age AS CHILD;     Positive for    Varicose Vein Stripping ;     Positive for    CARPEL TUNNEL REPAIR , ;;     Negative for    Colonoscopy and    Treadmill stress test;     Cholecystectomy: laparoscopic; ;     Procedures:    EGD ( 2017 )         Family History:     Father: Cause of death was ACCIDENTAL DEATH     Mother: Healthy;  Hypertension     Brother(s): Healthy; 1 brother(s) total     Sister(s): Healthy; 2 sister(s) total     Paternal Grandfather: Alcoholism     Paternal Grandmother: Medical history unknown;      Maternal Grandfather: Healthy;   at age 82     Maternal Grandmother:  at age 60; Cause of death was anurysm         Social History:     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          Alcohol:  Does not drink alcohol and never has.          Substance Abuse History:     Last Reviewed on 3/25/2019 02:57 PM by Samir Coto        Mental Health History:     Last Reviewed on 3/25/2019 02:57 PM by Samir Coto        Communicable Diseases (eg STDs):     Last Reviewed on 3/25/2019 02:57 PM by Samir Coto            Immunizations:     Adacel (Tdap) 2019     Zostavax (Zoster live) 10/18/2012         Allergies:     Last Reviewed on 4/10/2019 01:54 PM by Alyssa Jennings    Penicillins:        Current Medications:     Last Reviewed on 4/10/2019 01:54 PM by Alyssa Jennings    Atenolol 50mg Tablet Take 1 tablet(s) by mouth daily     Carbamazepine 100mg  Capsules, Extended Release Take 1 capsule(s) by mouth bid     Mobic 15mg Tablet Take 1 tablet(s) by mouth daily with food.     Omeprazole 20mg Capsules, Extended Release Take 1 capsule(s) by mouth daily     Synthroid 0.088mg Tablet Take 1 tablet(s) by mouth daily     Singulair  10mg Tablet Take 1 tablet(s) by mouth each evening     Latanoprost 0.005% Ophthalmic Solution Instill 1 drop(s) into both eye(s) each  evening     Lotemax 0.5% Ophthalmic Gel QID PRN         OBJECTIVE:        Vitals:         Current: 4/10/2019 1:55:20 PM    Ht:  5 ft, 2.25 in;  Wt: 156.4 lbs;  BMI: 28.4    T: 99.4 F;  BP: 130/81 mm Hg (left arm, sitting);  P: 87 bpm (left arm (BP Cuff), sitting);  sCr: 1.08 mg/dL;  GFR: 47.58        Exams:     PHYSICAL EXAM:     GENERAL:  well developed and nourished; appropriately groomed; in no apparent distress;     E/N/T: EARS:  normal external auditory canals and tympanic membranes;  grossly normal hearing; NOSE: no sinus tenderness; OROPHARYNX:  normal mucosa, dentition, gingiva, and posterior pharynx;     RESPIRATORY: normal respiratory rate and pattern with no distress; normal breath sounds with no rales, rhonchi, wheezes or rubs;     CARDIOVASCULAR: normal rate; rhythm is regular;  no systolic murmur;     LYMPHATIC: no enlargement of cervical or facial nodes;         ASSESSMENT           465.8   R05  Upper respiratory illness              DDx:         ORDERS:         Meds Prescribed:       Medrol (Methylprednisolone) 4mg Dosepak Take as directed with food  #1 (One) dose pack Refills: 0       Benzonatate 200mg Capsules One Q 8 hours PRN cough  #30 (Thirty) capsule(s) Refills: 0                 PLAN:          Upper respiratory illness due to upcoming surgery, will let me know if not improving /hold mobic while on medrol dose pack         RECOMMENDATIONS given include: rest and increase oral fluid intake.      FOLLOW-UP: Advised to call if there is no improvement in 4 day(s).            Prescriptions:        Medrol (Methylprednisolone) 4mg Dosepak Take as directed with food  #1 (One) dose pack Refills: 0       Benzonatate 200mg Capsules One Q 8 hours PRN cough  #30 (Thirty) capsule(s) Refills: 0             CHARGE CAPTURE           **Please note: ICD descriptions below are intended for billing purposes only and may not represent clinical diagnoses**        Primary Diagnosis:         465.8 Upper respiratory illness            R05    Cough              Orders:          40513   Office/outpatient visit; established patient, level 3  (In-House)

## 2021-07-01 VITALS
DIASTOLIC BLOOD PRESSURE: 79 MMHG | HEIGHT: 62 IN | SYSTOLIC BLOOD PRESSURE: 140 MMHG | WEIGHT: 154.6 LBS | HEART RATE: 78 BPM | TEMPERATURE: 98.2 F | BODY MASS INDEX: 28.45 KG/M2

## 2021-07-01 VITALS
BODY MASS INDEX: 28.63 KG/M2 | HEART RATE: 70 BPM | TEMPERATURE: 98.9 F | DIASTOLIC BLOOD PRESSURE: 78 MMHG | HEIGHT: 62 IN | WEIGHT: 155.6 LBS | SYSTOLIC BLOOD PRESSURE: 127 MMHG

## 2021-07-01 VITALS
DIASTOLIC BLOOD PRESSURE: 81 MMHG | WEIGHT: 156.4 LBS | SYSTOLIC BLOOD PRESSURE: 130 MMHG | TEMPERATURE: 99.4 F | HEART RATE: 87 BPM | BODY MASS INDEX: 28.78 KG/M2 | HEIGHT: 62 IN

## 2021-07-01 VITALS
HEART RATE: 66 BPM | BODY MASS INDEX: 28.78 KG/M2 | WEIGHT: 156.4 LBS | DIASTOLIC BLOOD PRESSURE: 90 MMHG | SYSTOLIC BLOOD PRESSURE: 166 MMHG | HEIGHT: 62 IN | TEMPERATURE: 98.6 F

## 2021-07-01 VITALS
SYSTOLIC BLOOD PRESSURE: 134 MMHG | TEMPERATURE: 98 F | BODY MASS INDEX: 29.19 KG/M2 | WEIGHT: 158.6 LBS | HEART RATE: 68 BPM | HEIGHT: 62 IN | DIASTOLIC BLOOD PRESSURE: 80 MMHG

## 2021-07-01 VITALS
HEART RATE: 69 BPM | DIASTOLIC BLOOD PRESSURE: 82 MMHG | HEIGHT: 62 IN | WEIGHT: 157.8 LBS | TEMPERATURE: 98.3 F | BODY MASS INDEX: 29.04 KG/M2 | SYSTOLIC BLOOD PRESSURE: 138 MMHG

## 2021-07-12 RX ORDER — ATENOLOL 50 MG/1
TABLET ORAL
Qty: 90 TABLET | Refills: 0 | Status: SHIPPED | OUTPATIENT
Start: 2021-07-12 | End: 2021-08-10 | Stop reason: SDUPTHER

## 2021-07-12 RX ORDER — LEVOTHYROXINE SODIUM 88 UG/1
TABLET ORAL
Qty: 90 TABLET | Refills: 0 | Status: SHIPPED | OUTPATIENT
Start: 2021-07-12 | End: 2021-08-10 | Stop reason: SDUPTHER

## 2021-07-12 NOTE — TELEPHONE ENCOUNTER
I did send a single refill on these.  I would advise scheduling a follow-up appointment given her medications.  It would be wise to see her a couple times a year.  Thanks.

## 2021-08-10 ENCOUNTER — TRANSCRIBE ORDERS (OUTPATIENT)
Dept: ADMINISTRATIVE | Facility: HOSPITAL | Age: 76
End: 2021-08-10

## 2021-08-10 ENCOUNTER — LAB (OUTPATIENT)
Dept: LAB | Facility: HOSPITAL | Age: 76
End: 2021-08-10

## 2021-08-10 ENCOUNTER — OFFICE VISIT (OUTPATIENT)
Dept: FAMILY MEDICINE CLINIC | Age: 76
End: 2021-08-10

## 2021-08-10 VITALS
TEMPERATURE: 97.7 F | WEIGHT: 164.6 LBS | BODY MASS INDEX: 29.16 KG/M2 | HEIGHT: 63 IN | SYSTOLIC BLOOD PRESSURE: 134 MMHG | DIASTOLIC BLOOD PRESSURE: 82 MMHG | HEART RATE: 74 BPM

## 2021-08-10 DIAGNOSIS — R79.89 SERUM CREATININE RAISED: Primary | ICD-10-CM

## 2021-08-10 DIAGNOSIS — I10 ESSENTIAL HYPERTENSION: ICD-10-CM

## 2021-08-10 DIAGNOSIS — G50.0 TRIGEMINAL NEURALGIA: ICD-10-CM

## 2021-08-10 DIAGNOSIS — Z23 ENCOUNTER FOR IMMUNIZATION: ICD-10-CM

## 2021-08-10 DIAGNOSIS — R79.89 SERUM CREATININE RAISED: ICD-10-CM

## 2021-08-10 DIAGNOSIS — Z12.31 BREAST CANCER SCREENING BY MAMMOGRAM: ICD-10-CM

## 2021-08-10 DIAGNOSIS — E03.9 ACQUIRED HYPOTHYROIDISM: ICD-10-CM

## 2021-08-10 DIAGNOSIS — Z11.59 NEED FOR HEPATITIS C SCREENING TEST: ICD-10-CM

## 2021-08-10 DIAGNOSIS — Z00.00 PHYSICAL EXAM: Primary | ICD-10-CM

## 2021-08-10 DIAGNOSIS — J30.9 ALLERGIC RHINITIS, UNSPECIFIED SEASONALITY, UNSPECIFIED TRIGGER: ICD-10-CM

## 2021-08-10 LAB
ALBUMIN SERPL-MCNC: 4.5 G/DL (ref 3.5–5.2)
ALBUMIN/GLOB SERPL: 2 G/DL
ALP SERPL-CCNC: 95 U/L (ref 39–117)
ALT SERPL W P-5'-P-CCNC: 19 U/L (ref 1–33)
ANION GAP SERPL CALCULATED.3IONS-SCNC: 9.5 MMOL/L (ref 5–15)
AST SERPL-CCNC: 19 U/L (ref 1–32)
BACTERIA UR QL AUTO: ABNORMAL /HPF
BILIRUB SERPL-MCNC: 0.2 MG/DL (ref 0–1.2)
BILIRUB UR QL STRIP: NEGATIVE
BUN SERPL-MCNC: 17 MG/DL (ref 8–23)
BUN/CREAT SERPL: 16 (ref 7–25)
CALCIUM SPEC-SCNC: 9.2 MG/DL (ref 8.6–10.5)
CARBAMAZEPINE SERPL-MCNC: 3 MCG/ML (ref 4–12)
CHLORIDE SERPL-SCNC: 102 MMOL/L (ref 98–107)
CLARITY UR: CLEAR
CO2 SERPL-SCNC: 26.5 MMOL/L (ref 22–29)
COLOR UR: YELLOW
CREAT SERPL-MCNC: 1.06 MG/DL (ref 0.57–1)
DEPRECATED RDW RBC AUTO: 44.6 FL (ref 37–54)
ERYTHROCYTE [DISTWIDTH] IN BLOOD BY AUTOMATED COUNT: 13.6 % (ref 12.3–15.4)
GFR SERPL CREATININE-BSD FRML MDRD: 51 ML/MIN/1.73
GLOBULIN UR ELPH-MCNC: 2.2 GM/DL
GLUCOSE SERPL-MCNC: 92 MG/DL (ref 65–99)
GLUCOSE UR STRIP-MCNC: NEGATIVE MG/DL
HCT VFR BLD AUTO: 37.6 % (ref 34–46.6)
HCV AB SER DONR QL: NORMAL
HGB BLD-MCNC: 12.4 G/DL (ref 12–15.9)
HGB UR QL STRIP.AUTO: ABNORMAL
HYALINE CASTS UR QL AUTO: ABNORMAL /LPF
KETONES UR QL STRIP: NEGATIVE
LEUKOCYTE ESTERASE UR QL STRIP.AUTO: ABNORMAL
MCH RBC QN AUTO: 29.4 PG (ref 26.6–33)
MCHC RBC AUTO-ENTMCNC: 33 G/DL (ref 31.5–35.7)
MCV RBC AUTO: 89.1 FL (ref 79–97)
NITRITE UR QL STRIP: NEGATIVE
PH UR STRIP.AUTO: 6 [PH] (ref 5–8)
PHOSPHATE SERPL-MCNC: 3.9 MG/DL (ref 2.5–4.5)
PLATELET # BLD AUTO: 218 10*3/MM3 (ref 140–450)
PMV BLD AUTO: 8.4 FL (ref 6–12)
POTASSIUM SERPL-SCNC: 4.1 MMOL/L (ref 3.5–5.2)
PROT SERPL-MCNC: 6.7 G/DL (ref 6–8.5)
PROT UR QL STRIP: NEGATIVE
RBC # BLD AUTO: 4.22 10*6/MM3 (ref 3.77–5.28)
RBC # UR: ABNORMAL /HPF
REF LAB TEST METHOD: ABNORMAL
SODIUM SERPL-SCNC: 138 MMOL/L (ref 136–145)
SP GR UR STRIP: 1.01 (ref 1–1.03)
SQUAMOUS #/AREA URNS HPF: ABNORMAL /HPF
TSH SERPL DL<=0.05 MIU/L-ACNC: 1.86 UIU/ML (ref 0.27–4.2)
UROBILINOGEN UR QL STRIP: ABNORMAL
WBC # BLD AUTO: 6.75 10*3/MM3 (ref 3.4–10.8)
WBC UR QL AUTO: ABNORMAL /HPF

## 2021-08-10 PROCEDURE — 90732 PPSV23 VACC 2 YRS+ SUBQ/IM: CPT | Performed by: FAMILY MEDICINE

## 2021-08-10 PROCEDURE — 1170F FXNL STATUS ASSESSED: CPT | Performed by: FAMILY MEDICINE

## 2021-08-10 PROCEDURE — 80053 COMPREHEN METABOLIC PANEL: CPT

## 2021-08-10 PROCEDURE — 86803 HEPATITIS C AB TEST: CPT

## 2021-08-10 PROCEDURE — 81001 URINALYSIS AUTO W/SCOPE: CPT

## 2021-08-10 PROCEDURE — 96160 PT-FOCUSED HLTH RISK ASSMT: CPT | Performed by: FAMILY MEDICINE

## 2021-08-10 PROCEDURE — 80156 ASSAY CARBAMAZEPINE TOTAL: CPT

## 2021-08-10 PROCEDURE — 36415 COLL VENOUS BLD VENIPUNCTURE: CPT

## 2021-08-10 PROCEDURE — 85027 COMPLETE CBC AUTOMATED: CPT

## 2021-08-10 PROCEDURE — 99214 OFFICE O/P EST MOD 30 MIN: CPT | Performed by: FAMILY MEDICINE

## 2021-08-10 PROCEDURE — G0439 PPPS, SUBSEQ VISIT: HCPCS | Performed by: FAMILY MEDICINE

## 2021-08-10 PROCEDURE — G0009 ADMIN PNEUMOCOCCAL VACCINE: HCPCS | Performed by: FAMILY MEDICINE

## 2021-08-10 PROCEDURE — 1159F MED LIST DOCD IN RCRD: CPT | Performed by: FAMILY MEDICINE

## 2021-08-10 PROCEDURE — 84100 ASSAY OF PHOSPHORUS: CPT

## 2021-08-10 PROCEDURE — 84443 ASSAY THYROID STIM HORMONE: CPT

## 2021-08-10 RX ORDER — ATENOLOL 50 MG/1
50 TABLET ORAL DAILY
Qty: 90 TABLET | Refills: 1 | Status: SHIPPED | OUTPATIENT
Start: 2021-08-10 | End: 2021-10-07

## 2021-08-10 RX ORDER — MONTELUKAST SODIUM 10 MG/1
10 TABLET ORAL NIGHTLY
Qty: 90 TABLET | Refills: 3 | Status: SHIPPED | OUTPATIENT
Start: 2021-08-10 | End: 2022-06-30 | Stop reason: SDUPTHER

## 2021-08-10 RX ORDER — LEVOTHYROXINE SODIUM 88 UG/1
88 TABLET ORAL DAILY
Qty: 90 TABLET | Refills: 1 | Status: SHIPPED | OUTPATIENT
Start: 2021-08-10 | End: 2021-10-07

## 2021-08-10 RX ORDER — CARBAMAZEPINE 100 MG/1
100 CAPSULE, EXTENDED RELEASE ORAL NIGHTLY
Qty: 90 CAPSULE | Refills: 1 | Status: SHIPPED | OUTPATIENT
Start: 2021-08-10 | End: 2022-04-29 | Stop reason: SDUPTHER

## 2021-08-10 RX ORDER — OMEPRAZOLE 20 MG/1
20 CAPSULE, DELAYED RELEASE ORAL EVERY MORNING
Qty: 90 CAPSULE | Refills: 1 | Status: SHIPPED | OUTPATIENT
Start: 2021-08-10 | End: 2021-10-07

## 2021-08-10 NOTE — PROGRESS NOTES
Subsequent Medicare Wellness Visit  The ABC's of Medicare Wellness Visit    Chief Complaint:  Medicare wellness exam, follow up on thyroid, cholesterol, trigeminal neuralgia    Subjective   History of Present Illness      Tish Urias is a 75 y.o. female who presents   for a Subsequent Medicare Wellness Visit.    Does the patient have evidence of cognitive impairment?   No    Asprin use counseling:Does not need ASA (and currently is not on it)    Recent Hospitalizations:  No hospitalization(s) within the last year.    Advanced Care Planning:  ACP discussion was held with the patient during this visit. Patient has an advance directive in EMR which is still valid.     The following portions of the patient's history were reviewed   and updated as appropriate: allergies, current medications, past family history, past medical history, past social history, past surgical history and problem list.    Compared to one year ago, the patient feels her   physical health is the same.  Compared to one year ago, the patient feels her   mental health is the same.    Reviewed chart for potential of high risk medication in the elderly:  yes  Reviewed chart for potential of harmful drug interactions in the elderly:  yes    Patient's Body mass index is 29.63 kg/m². indicating that she is overweight (BMI 25-29.9). Obesity-related health conditions include the following: hypertension. Obesity is worsening. BMI is is above average; BMI management plan is completed. We discussed portion control and increasing exercise..       HEALTH RISK ASSESSMENT BEGIN  Fall Risk Screen:  STEADI Fall Risk Assessment was completed, and patient is at MODERATE risk for falls. Assessment completed on:8/10/2021    Depression Screen:   PHQ-2/PHQ-9 Depression Screening 8/10/2021   Little interest or pleasure in doing things 0   Feeling down, depressed, or hopeless 0   Total Score 0       Current Medical Providers:  Patient Care Team:  Samir Coto,  MD as PCP - General (Family Medicine)    Smoking Status:  Social History     Tobacco Use   Smoking Status Never Smoker   Smokeless Tobacco Never Used       Alcohol Consumption:  Social History     Substance and Sexual Activity   Alcohol Use No       Health Habits and Functional and Cognitive Screening:  Functional & Cognitive Status 8/10/2021   Do you have difficulty preparing food and eating? No   Do you have difficulty bathing yourself, getting dressed or grooming yourself? No   Do you have difficulty using the toilet? No   Do you have difficulty moving around from place to place? No   Do you have trouble with steps or getting out of a bed or a chair? No   Current Diet Well Balanced Diet   Dental Exam Up to date   Eye Exam Up to date   Exercise (times per week) 0 times per week   Current Exercises Include No Regular Exercise   Do you need help using the phone?  No   Are you deaf or do you have serious difficulty hearing?  No   Do you need help with transportation? No   Do you need help shopping? No   Do you need help preparing meals?  No   Do you need help with housework?  No   Do you need help with laundry? No   Do you need help taking your medications? No   Do you need help managing money? No   Do you ever drive or ride in a car without wearing a seat belt? No   Have you felt unusual stress, anger or loneliness in the last month? No   Who do you live with? Spouse   If you need help, do you have trouble finding someone available to you? No   Have you been bothered in the last four weeks by sexual problems? No   Do you have difficulty concentrating, remembering or making decisions? No       Age-appropriate Screening Schedule:  Refer to the list below for future screening recommendations based on patient's age, sex and/or medical conditions. Orders for these recommended tests are listed in the plan section. The patient has been provided with a written plan.    Health Maintenance   Topic Date Due   • TDAP/TD  VACCINES (1 - Tdap) Never done   • ZOSTER VACCINE (2 of 3) 12/13/2012   • INFLUENZA VACCINE  10/01/2021   • DXA SCAN  10/07/2021   • MAMMOGRAM  10/08/2021   • LIPID PANEL  02/23/2022     HEALTH RISK ASSESSMENT END    Outpatient Medications Prior to Visit   Medication Sig Dispense Refill   • b complex-C-folic acid 1 MG capsule Take 1 capsule by mouth Daily.     • cholecalciferol (VITAMIN D3) 1000 UNITS tablet Take 1,000 Units by mouth Daily.     • Ketotifen Fumarate (ALAWAY OP) Apply 1 drop to eye(s) as directed by provider 2 (Two) Times a Day As Needed (FOR ALLERGIES).     • latanoprost (XALATAN) 0.005 % ophthalmic solution Administer 1 drop to both eyes Every Night.     • loratadine (CLARITIN) 10 MG tablet Take 10 mg by mouth Daily.     • LOTEMAX 0.5 % gel ophthalmic gel Administer 1 drop to both eyes 4 (Four) Times a Day As Needed (ALLERIGIES).     • atenolol (TENORMIN) 50 MG tablet Take 1 tablet by mouth once daily 90 tablet 0   • carBAMazepine (CARBATROL) 100 MG 12 hr capsule Take 100 mg by mouth Daily.     • Euthyrox 88 MCG tablet Take 1 tablet by mouth once daily 90 tablet 0   • montelukast (SINGULAIR) 10 MG tablet Take 10 mg by mouth Every Night.     • omeprazole (priLOSEC) 20 MG capsule Take 20 mg by mouth Every Morning.     • HYDROcodone-acetaminophen (NORCO)  MG per tablet Take 1-2 tablets by mouth Every 4 to 6 Hours As Needed for pain 50 tablet 0   • meloxicam (MOBIC) 15 MG tablet Take 15 mg by mouth Daily.     • oxyCODONE-acetaminophen (PERCOCET)  MG per tablet Take 1-2 tablets by mouth Every 6 (Six) Hours As Needed for pain 50 tablet 0   • promethazine (PHENERGAN) 25 MG tablet Take 1 tablet by mouth Every 6 (Six) Hours As Needed for nausea or vomiting 40 tablet 0     No facility-administered medications prior to visit.       Patient Active Problem List   Diagnosis   • Gallstones   • Calculus of bile duct without cholecystitis and without obstruction   • Epigastric pain   • Irritable bowel  "syndrome without diarrhea   • S/P reverse total shoulder arthroplasty, left   • Hypothyroidism   • Hypertension   • Trigeminal neuralgia       In addition to the wellness exam, Tish is also here for follow-up on her usual problems.  She does have a history of trigeminal neuralgia for which she remains on Tegretol.  She did try to stop it at some point during the last year.  However she had recurrent trigeminal nerve pain when she did that.  She only takes a low-dose once daily now and that seems to be effective.    Tish also has hypothyroidism and remains on levothyroxine.  She is unaware of any problem related to the thyroid.  She has been on some kind of treatment for at least 10 years.    Tish also has a history of hypertension and currently takes atenolol for this.  She tolerates that well and denies any acute issue related to it.      Review of Systems:  Review of Systems   Constitutional: Negative for chills and fever.   Respiratory: Negative for cough and shortness of breath.    Cardiovascular: Negative for chest pain and palpitations.   Gastrointestinal: Negative for abdominal pain, nausea and vomiting.        Objective      Vitals:    08/10/21 1322   BP: 134/82   BP Location: Left arm   Patient Position: Sitting   Pulse: 74   Temp: 97.7 °F (36.5 °C)   TempSrc: Oral   Weight: 74.7 kg (164 lb 9.6 oz)   Height: 158.8 cm (62.5\")       Physical Exam:  Physical Exam  Vitals and nursing note reviewed.   Constitutional:       General: She is not in acute distress.     Appearance: She is not ill-appearing.   HENT:      Right Ear: Tympanic membrane and ear canal normal.      Left Ear: Tympanic membrane and ear canal normal.      Ears:      Comments: Hearing is normal bilaterally.     Mouth/Throat:      Mouth: Mucous membranes are moist.      Comments: Pharynx appears normal  Eyes:      Extraocular Movements: Extraocular movements intact.      Pupils: Pupils are equal, round, and reactive to light.      Comments: " Binocular vision is 20/20 with correction.   Neck:      Thyroid: No thyromegaly.   Cardiovascular:      Rate and Rhythm: Normal rate and regular rhythm.      Heart sounds: No murmur heard.     Pulmonary:      Effort: Pulmonary effort is normal.      Breath sounds: Normal breath sounds.   Abdominal:      General: There is no distension.      Palpations: Abdomen is soft. There is no mass.      Tenderness: There is no abdominal tenderness.   Musculoskeletal:      Cervical back: Normal range of motion.   Skin:     Findings: No lesion or rash.   Neurological:      General: No focal deficit present.      Mental Status: She is oriented to person, place, and time.      Cranial Nerves: No cranial nerve deficit.   Psychiatric:         Mood and Affect: Mood normal.           Result Review :           The data below has been reviewed by Samir Coto MD on 08/10/2021.  Lab Results   Component Value Date    WBC 7.38 02/23/2021    HGB 12.50 02/23/2021    HCT 38.3 02/23/2021    MCV 89.5 02/23/2021    .00 02/23/2021     Lab Results   Component Value Date    GLUCOSE 113 (H) 05/29/2019    BUN 20 02/23/2021    CREATININE 1.20 (H) 02/23/2021    EGFRIFNONA 68 05/29/2019    BCR 17 02/23/2021    K 4.7 02/23/2021    CO2 28 02/23/2021    CALCIUM 9.0 02/23/2021    ALBUMIN 4.1 02/23/2021    LABIL2 1.6 02/23/2021    AST 19 02/23/2021    ALT 12 02/23/2021     Lab Results   Component Value Date    TSH 2.030 02/23/2021     Lab Results   Component Value Date    CHLPL 267 (H) 02/23/2021    TRIG 213 (H) 02/23/2021    HDL 54 02/23/2021     (H) 02/23/2021     Lab Results   Component Value Date    CBMZ 2.8 (L) 02/23/2021                 Assessment/Plan   Assessment and Plan:   Tish seems well today.  Please see above and below for screening recommendations and other evaluations related to her wellness exam.  She continues to be in good overall health.  We have reviewed her other medical problems as well and will update  medications and labs as noted below.  I do not necessarily anticipate changes.  I did recommend cholesterol-lowering medication, but she has been resistant to this.           Diagnoses and all orders for this visit:    1. Physical exam (Primary)    2. Trigeminal neuralgia  -     carBAMazepine (CARBATROL) 100 MG 12 hr capsule; Take 1 capsule by mouth Every Night.  Dispense: 90 capsule; Refill: 1  -     CBC (No Diff); Future  -     Carbamazepine level, total; Future    3. Acquired hypothyroidism  -     levothyroxine (Euthyrox) 88 MCG tablet; Take 1 tablet by mouth Daily.  Dispense: 90 tablet; Refill: 1  -     TSH; Future    4. Essential hypertension  -     atenolol (TENORMIN) 50 MG tablet; Take 1 tablet by mouth Daily.  Dispense: 90 tablet; Refill: 1  -     Comprehensive Metabolic Panel; Future    5. Allergic rhinitis, unspecified seasonality, unspecified trigger  -     montelukast (Singulair) 10 MG tablet; Take 1 tablet by mouth Every Night.  Dispense: 90 tablet; Refill: 3    6. Need for hepatitis C screening test  -     Hepatitis C Antibody; Future    7. Encounter for immunization  -     Pneumococcal Polysaccharide Vaccine 23-Valent Greater Than or Equal To 3yo Subcutaneous / IM    8. Breast cancer screening by mammogram  -     Mammo Screening Digital Tomosynthesis Bilateral With CAD; Future    Other orders  -     omeprazole (priLOSEC) 20 MG capsule; Take 1 capsule by mouth Every Morning.  Dispense: 90 capsule; Refill: 1        Medicare Risks and Personalized Health Plan  CMS Preventative Services Quick Reference  Breast Cancer/Mammogram Screening  Cardiovascular risk  Colon Cancer Screening  Fall Risk  Osteoporosis Risk    The above risks/problems have been discussed with the patient.  Pertinent information has been shared with the patient in the   After Visit Summary. Follow up plans and orders are seen above   in the Assessment/Plan Section.        Follow Up    Return in about 3 months (around 11/10/2021).     An  After Visit Summary and PPPS were given to the patient.

## 2021-08-10 NOTE — PATIENT INSTRUCTIONS
Check on insurance coverage and cost for Shingrix (newest shingles vaccine) and get the immunization at your local pharmacy. It is more effective than the old Zostavax vaccine and is recommended even if you have had the Zostavax vaccine in the past. For more information, please look at the website below:  https://www.cdc.gov/vaccines/vpd/shingles/public/shingrix/index.html    Check on insurance coverage and cost for adacel Tdap(tetanus plus whooping cough vaccine) and get the immunization at your local pharmacy. (Once every 10 Years)

## 2021-09-08 ENCOUNTER — HOSPITAL ENCOUNTER (OUTPATIENT)
Dept: MAMMOGRAPHY | Facility: HOSPITAL | Age: 76
Discharge: HOME | End: 2021-09-08
Admitting: FAMILY MEDICINE

## 2021-09-08 DIAGNOSIS — Z12.31 BREAST CANCER SCREENING BY MAMMOGRAM: ICD-10-CM

## 2021-09-08 PROCEDURE — 77063 BREAST TOMOSYNTHESIS BI: CPT

## 2021-09-08 PROCEDURE — 77067 SCR MAMMO BI INCL CAD: CPT

## 2021-10-07 DIAGNOSIS — I10 ESSENTIAL HYPERTENSION: ICD-10-CM

## 2021-10-07 DIAGNOSIS — E03.9 ACQUIRED HYPOTHYROIDISM: ICD-10-CM

## 2021-10-07 RX ORDER — ATENOLOL 50 MG/1
TABLET ORAL
Qty: 90 TABLET | Refills: 0 | Status: SHIPPED | OUTPATIENT
Start: 2021-10-07 | End: 2022-01-03

## 2021-10-07 RX ORDER — OMEPRAZOLE 20 MG/1
CAPSULE, DELAYED RELEASE ORAL
Qty: 90 CAPSULE | Refills: 0 | Status: SHIPPED | OUTPATIENT
Start: 2021-10-07 | End: 2022-01-03

## 2021-10-07 RX ORDER — LEVOTHYROXINE SODIUM 88 UG/1
TABLET ORAL
Qty: 90 TABLET | Refills: 0 | Status: SHIPPED | OUTPATIENT
Start: 2021-10-07 | End: 2022-01-03

## 2021-10-21 ENCOUNTER — TELEPHONE (OUTPATIENT)
Dept: FAMILY MEDICINE CLINIC | Age: 76
End: 2021-10-21

## 2021-10-21 NOTE — TELEPHONE ENCOUNTER
Caller: Tish Urias    Relationship: Self      Medication requested (name and dosage): montelukast (Singulair) 10 MG tablet REQUESTING 90 DAY SUPPLY WITH REFILLS    Pharmacy where request should be sent:[facility] [address] [place] [address] [id number] [facility] [phone]  - [phone] FX    Additional details provided by patient: PATIENT STATED THAT THIS MEDICATION WAS DISCUSSED AT LAST APPOINTMENT AND SHE HAS 2 DAYS REMAILING    Best call back number: [phone]    Does the patient have less than a 3 day supply:  [x] Yes  [] No    Sadi PEREZ Rep   10/21/21 10:04 EDT

## 2022-01-03 DIAGNOSIS — E03.9 ACQUIRED HYPOTHYROIDISM: ICD-10-CM

## 2022-01-03 DIAGNOSIS — I10 ESSENTIAL HYPERTENSION: ICD-10-CM

## 2022-01-03 RX ORDER — LEVOTHYROXINE SODIUM 88 UG/1
TABLET ORAL
Qty: 90 TABLET | Refills: 0 | Status: SHIPPED | OUTPATIENT
Start: 2022-01-03 | End: 2022-04-05

## 2022-01-03 RX ORDER — ATENOLOL 50 MG/1
TABLET ORAL
Qty: 90 TABLET | Refills: 0 | Status: SHIPPED | OUTPATIENT
Start: 2022-01-03 | End: 2022-04-05

## 2022-01-03 RX ORDER — OMEPRAZOLE 20 MG/1
CAPSULE, DELAYED RELEASE ORAL
Qty: 90 CAPSULE | Refills: 0 | Status: SHIPPED | OUTPATIENT
Start: 2022-01-03 | End: 2022-04-05

## 2022-04-05 DIAGNOSIS — E03.9 ACQUIRED HYPOTHYROIDISM: ICD-10-CM

## 2022-04-05 DIAGNOSIS — I10 ESSENTIAL HYPERTENSION: ICD-10-CM

## 2022-04-05 DIAGNOSIS — K21.9 GASTROESOPHAGEAL REFLUX DISEASE, UNSPECIFIED WHETHER ESOPHAGITIS PRESENT: Primary | ICD-10-CM

## 2022-04-05 RX ORDER — ATENOLOL 50 MG/1
TABLET ORAL
Qty: 90 TABLET | Refills: 0 | Status: SHIPPED | OUTPATIENT
Start: 2022-04-05 | End: 2022-04-29 | Stop reason: SDUPTHER

## 2022-04-05 RX ORDER — LEVOTHYROXINE SODIUM 88 UG/1
TABLET ORAL
Qty: 90 TABLET | Refills: 0 | Status: SHIPPED | OUTPATIENT
Start: 2022-04-05 | End: 2022-04-29 | Stop reason: SDUPTHER

## 2022-04-05 RX ORDER — OMEPRAZOLE 20 MG/1
CAPSULE, DELAYED RELEASE ORAL
Qty: 90 CAPSULE | Refills: 0 | Status: SHIPPED | OUTPATIENT
Start: 2022-04-05 | End: 2022-04-29 | Stop reason: SDUPTHER

## 2022-04-05 NOTE — TELEPHONE ENCOUNTER
I did authorize a single refill on these medications.  Please arrange follow-up visit at her convenience.  She will be due for blood work and additional evaluation.  Thanks.

## 2022-04-29 ENCOUNTER — LAB (OUTPATIENT)
Dept: LAB | Facility: HOSPITAL | Age: 77
End: 2022-04-29

## 2022-04-29 ENCOUNTER — OFFICE VISIT (OUTPATIENT)
Dept: FAMILY MEDICINE CLINIC | Age: 77
End: 2022-04-29

## 2022-04-29 VITALS
HEART RATE: 67 BPM | SYSTOLIC BLOOD PRESSURE: 165 MMHG | WEIGHT: 166.6 LBS | BODY MASS INDEX: 29.52 KG/M2 | TEMPERATURE: 98.4 F | HEIGHT: 63 IN | DIASTOLIC BLOOD PRESSURE: 91 MMHG

## 2022-04-29 DIAGNOSIS — G50.0 TRIGEMINAL NEURALGIA: ICD-10-CM

## 2022-04-29 DIAGNOSIS — I10 ESSENTIAL HYPERTENSION: ICD-10-CM

## 2022-04-29 DIAGNOSIS — Z79.899 OTHER LONG TERM (CURRENT) DRUG THERAPY: ICD-10-CM

## 2022-04-29 DIAGNOSIS — E03.9 ACQUIRED HYPOTHYROIDISM: ICD-10-CM

## 2022-04-29 DIAGNOSIS — G50.0 TRIGEMINAL NEURALGIA: Primary | ICD-10-CM

## 2022-04-29 DIAGNOSIS — K21.9 GASTROESOPHAGEAL REFLUX DISEASE, UNSPECIFIED WHETHER ESOPHAGITIS PRESENT: ICD-10-CM

## 2022-04-29 LAB
ALBUMIN SERPL-MCNC: 4.2 G/DL (ref 3.5–5.2)
ALBUMIN/GLOB SERPL: 1.5 G/DL
ALP SERPL-CCNC: 96 U/L (ref 39–117)
ALT SERPL W P-5'-P-CCNC: 14 U/L (ref 1–33)
ANION GAP SERPL CALCULATED.3IONS-SCNC: 9.7 MMOL/L (ref 5–15)
AST SERPL-CCNC: 21 U/L (ref 1–32)
BILIRUB SERPL-MCNC: 0.3 MG/DL (ref 0–1.2)
BUN SERPL-MCNC: 21 MG/DL (ref 8–23)
BUN/CREAT SERPL: 19.4 (ref 7–25)
CALCIUM SPEC-SCNC: 9.7 MG/DL (ref 8.6–10.5)
CHLORIDE SERPL-SCNC: 103 MMOL/L (ref 98–107)
CO2 SERPL-SCNC: 26.3 MMOL/L (ref 22–29)
CREAT SERPL-MCNC: 1.08 MG/DL (ref 0.57–1)
DEPRECATED RDW RBC AUTO: 44.9 FL (ref 37–54)
EGFRCR SERPLBLD CKD-EPI 2021: 54 ML/MIN/1.73
ERYTHROCYTE [DISTWIDTH] IN BLOOD BY AUTOMATED COUNT: 13.3 % (ref 12.3–15.4)
GLOBULIN UR ELPH-MCNC: 2.8 GM/DL
GLUCOSE SERPL-MCNC: 87 MG/DL (ref 65–99)
HCT VFR BLD AUTO: 40.3 % (ref 34–46.6)
HGB BLD-MCNC: 13 G/DL (ref 12–15.9)
MCH RBC QN AUTO: 29.2 PG (ref 26.6–33)
MCHC RBC AUTO-ENTMCNC: 32.3 G/DL (ref 31.5–35.7)
MCV RBC AUTO: 90.6 FL (ref 79–97)
PLATELET # BLD AUTO: 225 10*3/MM3 (ref 140–450)
PMV BLD AUTO: 8.6 FL (ref 6–12)
POTASSIUM SERPL-SCNC: 5 MMOL/L (ref 3.5–5.2)
PROT SERPL-MCNC: 7 G/DL (ref 6–8.5)
RBC # BLD AUTO: 4.45 10*6/MM3 (ref 3.77–5.28)
SODIUM SERPL-SCNC: 139 MMOL/L (ref 136–145)
T4 FREE SERPL-MCNC: 1.58 NG/DL (ref 0.93–1.7)
TSH SERPL DL<=0.05 MIU/L-ACNC: 2.36 UIU/ML (ref 0.27–4.2)
WBC NRBC COR # BLD: 6.86 10*3/MM3 (ref 3.4–10.8)

## 2022-04-29 PROCEDURE — 84439 ASSAY OF FREE THYROXINE: CPT

## 2022-04-29 PROCEDURE — 36415 COLL VENOUS BLD VENIPUNCTURE: CPT

## 2022-04-29 PROCEDURE — 84443 ASSAY THYROID STIM HORMONE: CPT

## 2022-04-29 PROCEDURE — 80053 COMPREHEN METABOLIC PANEL: CPT

## 2022-04-29 PROCEDURE — 85027 COMPLETE CBC AUTOMATED: CPT

## 2022-04-29 PROCEDURE — 99214 OFFICE O/P EST MOD 30 MIN: CPT | Performed by: FAMILY MEDICINE

## 2022-04-29 RX ORDER — OMEPRAZOLE 20 MG/1
20 CAPSULE, DELAYED RELEASE ORAL DAILY
Qty: 90 CAPSULE | Refills: 1 | Status: SHIPPED | OUTPATIENT
Start: 2022-04-29 | End: 2022-11-14 | Stop reason: SDUPTHER

## 2022-04-29 RX ORDER — LEVOTHYROXINE SODIUM 88 UG/1
88 TABLET ORAL DAILY
Qty: 90 TABLET | Refills: 1 | Status: SHIPPED | OUTPATIENT
Start: 2022-04-29 | End: 2022-11-14 | Stop reason: SDUPTHER

## 2022-04-29 RX ORDER — ATENOLOL 50 MG/1
50 TABLET ORAL DAILY
Qty: 90 TABLET | Refills: 1 | Status: SHIPPED | OUTPATIENT
Start: 2022-04-29 | End: 2022-11-14 | Stop reason: SDUPTHER

## 2022-04-29 RX ORDER — CARBAMAZEPINE 100 MG/1
100 CAPSULE, EXTENDED RELEASE ORAL NIGHTLY
Qty: 90 CAPSULE | Refills: 1 | Status: SHIPPED | OUTPATIENT
Start: 2022-04-29 | End: 2022-11-14 | Stop reason: SDUPTHER

## 2022-04-29 NOTE — PROGRESS NOTES
"Tish Urias presents to [facility].    Chief Complaint:  Trigeminal neuralgia, thyroid, blood pressure    Subjective       History of Present Illness:  Tish is in today for follow-up on her usual problems.  She has trigeminal neuralgia for which she remains on Tegretol.  She did try to stop it at some point during the last year.  However she had recurrent trigeminal nerve pain when she did that.  She only takes a low-dose once daily now and that seems to be effective.     Tish also has hypothyroidism and remains on levothyroxine.  She is unaware of any problem related to the thyroid.  She has been on some kind of treatment for at least 10 years.     Tish has hypertension and currently takes atenolol for this.  She tolerates that well and denies any acute issue related to it.      Review of Systems:  Review of Systems   Constitutional: Negative for chills and fever.   Respiratory: Negative for cough and shortness of breath.    Cardiovascular: Negative for chest pain and palpitations.   Gastrointestinal: Negative for abdominal pain, nausea and vomiting.        Objective   Medical History:  Past Medical History:   • Allergic rhinitis   • Anesthesia complication    WITH CARPAL TUNNEL RELEASE, \"RELAXING MEDICINE\" HAD ISSUES WITH ITCHING\"   • Arthritis   • GERD (gastroesophageal reflux disease)   • History of anemia   • History of glaucoma   • History of osteoporosis   • History of varicose veins    WITH TORI LEG VEIN STRIPPING   • Hyperlipidemia   • Hypertension   • Hypothyroidism   • Left shoulder pain   • Rotator cuff disorder, left   • Sinus infection    WITH COUGH. RECOVERING FROM. WAS TREATED WITH ZPAC   • Trigeminal neuralgia   • Yeast infection    SCALP. BEEN TREATED. RESOLVED     Past Surgical History:   • CARPAL TUNNEL RELEASE   • CATARACT EXTRACTION    LEFT AND RIGHT   • CHOLECYSTECTOMY WITH INTRAOPERATIVE CHOLANGIOGRAM    Procedure: CHOLECYSTECTOMY LAPAROSCOPIC INTRAOPERATIVE " CHOLANGIOGRAM;  Surgeon: Chaitanya Elizabeth MD;  Location: [facility];  Service:    • COLONOSCOPY    done at [facility]    • ENDOSCOPY    Procedure: ESOPHAGOGASTRODUODENOSCOPY WITH COLD BIOPSIES;  Surgeon: Shaheen Andre MD;  Location: [facility] ENDOSCOPY;  Service: gastritis   • ERCP    Jes CHISHOLM   Sphincterotopy performed, biliary tree swept, choledocholithiasis   • MS ERCP DX COLLECTION SPECIMEN BRUSHING/WASHING    Procedure: ENDOSCOPIC RETROGRADE CHOLANGIOPANCREATOGRAPHY WITH SPHINCTEROTOMY AND BALLOON SWEEP AND STONE EXTRACTION;  Surgeon: Shaheen Andre MD;  Location: [facility] ENDOSCOPY;  Service: Gastroenterology   • SHOULDER ARTHROSCOPY W/ ROTATOR CUFF REPAIR    Procedure: LEFT SHOULDER ARTHROSCOPY, BICEPS TENOTOMY, DEBRIDEMENT OF ROTATOR CUFF AND LABRUM;  Surgeon: Yoseph Galvan MD;  Location: [facility];  Service: Orthopedics   • TONSILLECTOMY   • TOTAL SHOULDER ARTHROPLASTY W/ DISTAL CLAVICLE EXCISION    Procedure: LEFT TOTAL SHOULDER REVERSE ARTHROPLASTY;  Surgeon: Yoseph Galvan MD;  Location: [facility];  Service: Orthopedics   • VARICOSE VEIN SURGERY    LEFT AND RIGHT      Family History   Problem Relation Age of Onset   • Malig Hyperthermia Neg Hx      Social History     Tobacco Use   • Smoking status: Never Smoker   • Smokeless tobacco: Never Used   Substance Use Topics   • Alcohol use: No       Health Maintenance Due   Topic Date Due   • TDAP/TD VACCINES (1 - Tdap) Never done   • DXA SCAN  10/07/2021   • ZOSTER VACCINE (3 of 3) 11/15/2021   • LIPID PANEL  02/23/2022        Immunization History   Administered Date(s) Administered   • COVID-19 (MODERNA) 1st, 2nd, 3rd Dose Only 02/23/2021, 03/26/2021, 12/28/2021   • Pneumococcal Polysaccharide (PPSV23) 08/10/2021   • Shingrix 09/20/2021   • Zostavax 10/18/2012       Allergies   Allergen Reactions   • Penicillins Rash        Medications:  Current Outpatient Medications on File Prior to Visit   Medication Sig   • b  "complex-C-folic acid 1 MG capsule Take 1 capsule by mouth Daily.   • cholecalciferol (VITAMIN D3) 1000 UNITS tablet Take 1,000 Units by mouth Daily.   • Ketotifen Fumarate (ALAWAY OP) Apply 1 drop to eye(s) as directed by provider 2 (Two) Times a Day As Needed (FOR ALLERGIES).   • latanoprost (XALATAN) 0.005 % ophthalmic solution Administer 1 drop to both eyes Every Night.   • loratadine (CLARITIN) 10 MG tablet Take 10 mg by mouth Daily.   • LOTEMAX 0.5 % gel ophthalmic gel Administer 1 drop to both eyes 4 (Four) Times a Day As Needed (ALLERIGIES).   • montelukast (Singulair) 10 MG tablet Take 1 tablet by mouth Every Night.   • [DISCONTINUED] atenolol (TENORMIN) 50 MG tablet Take 1 tablet by mouth once daily   • [DISCONTINUED] carBAMazepine (CARBATROL) 100 MG 12 hr capsule Take 1 capsule by mouth Every Night.   • [DISCONTINUED] Euthyrox 88 MCG tablet Take 1 tablet by mouth once daily   • [DISCONTINUED] omeprazole (priLOSEC) 20 MG capsule Take 1 capsule by mouth once daily     No current facility-administered medications on file prior to visit.       Vital Signs:   /77 (BP Location: Left arm, Patient Position: Sitting)   Pulse 64   Temp 98.4 °F (36.9 °C) (Oral)   Ht 158.8 cm (62.52\")   Wt 75.6 kg (166 lb 9.6 oz)   BMI 29.97 kg/m²       Physical Exam:  Physical Exam  Vitals reviewed.   Constitutional:       General: She is not in acute distress.     Appearance: She is not ill-appearing.   Eyes:      Pupils: Pupils are equal, round, and reactive to light.   Neck:      Comments: No thyromegaly  Cardiovascular:      Rate and Rhythm: Normal rate and regular rhythm.   Pulmonary:      Effort: Pulmonary effort is normal.      Breath sounds: Normal breath sounds.   Abdominal:      General: There is no distension.      Palpations: Abdomen is soft.      Tenderness: There is no abdominal tenderness.   Musculoskeletal:      Cervical back: Neck supple.   Lymphadenopathy:      Cervical: No cervical adenopathy.   Skin:    "  Findings: No lesion or rash.   Neurological:      Mental Status: She is alert.         Result Review      The following data was reviewed by Samir Coto MD on 04/29/2022.  Lab Results   Component Value Date    WBC 6.75 08/10/2021    HGB 12.4 08/10/2021    HCT 37.6 08/10/2021    MCV 89.1 08/10/2021     08/10/2021     Lab Results   Component Value Date    GLUCOSE 92 08/10/2021    BUN 17 08/10/2021    CREATININE 1.06 (H) 08/10/2021     08/10/2021    K 4.1 08/10/2021     08/10/2021    CO2 26.5 08/10/2021    CALCIUM 9.2 08/10/2021    PROTEINTOT 6.7 08/10/2021    ALBUMIN 4.50 08/10/2021    ALT 19 08/10/2021    AST 19 08/10/2021    ALKPHOS 95 08/10/2021    BILITOT 0.2 08/10/2021    EGFRIFNONA 51 (L) 08/10/2021    GLOB 2.2 08/10/2021    AGRATIO 2.0 08/10/2021    BCR 16.0 08/10/2021    ANIONGAP 9.5 08/10/2021      Lab Results   Component Value Date    CHLPL 267 (H) 02/23/2021    TRIG 213 (H) 02/23/2021    HDL 54 02/23/2021     (H) 02/23/2021     Lab Results   Component Value Date    TSH 1.860 08/10/2021     No results found for: HGBA1C         Assessment and Plan:   Today, we have reviewed Tish's care.  She is well.  We will refill needed medications for her and update labs.  We will plan to see her back in about 6 months.  No short-term change is anticipated.  We will repeat her blood pressure before she leaves.       Diagnoses and all orders for this visit:    1. Trigeminal neuralgia (Primary)  -     carBAMazepine (CARBATROL) 100 MG 12 hr capsule; Take 1 capsule by mouth Every Night.  Dispense: 90 capsule; Refill: 1  -     CBC (No Diff); Future    2. Essential hypertension  -     atenolol (TENORMIN) 50 MG tablet; Take 1 tablet by mouth Daily.  Dispense: 90 tablet; Refill: 1  -     Comprehensive Metabolic Panel; Future    3. Acquired hypothyroidism  -     levothyroxine (Euthyrox) 88 MCG tablet; Take 1 tablet by mouth Daily.  Dispense: 90 tablet; Refill: 1  -     TSH+Free T4;  Future    4. Gastroesophageal reflux disease, unspecified whether esophagitis present  -     omeprazole (priLOSEC) 20 MG capsule; Take 1 capsule by mouth Daily.  Dispense: 90 capsule; Refill: 1    5. Other long term (current) drug therapy  -     CBC (No Diff); Future          Follow Up   Return in about 6 months (around 10/29/2022) for Recheck, Medicare Wellness.  Patient was given instructions and counseling regarding her condition or for health maintenance advice. Please see specific information pulled into the AVS if appropriate.

## 2022-05-28 ENCOUNTER — TELEPHONE (OUTPATIENT)
Dept: FAMILY MEDICINE CLINIC | Age: 77
End: 2022-05-28

## 2022-05-28 NOTE — TELEPHONE ENCOUNTER
I had a reminder to check on Tish's blood pressure.  Her blood pressure was moderately elevated when she was here.  Please reach out to her and see if she would be willing to stop into our allergy room to have a blood pressure check.  Let her know that we do not charge for these.  Also, I do not see that she reviewed her lab results.  Please see her most recent labs and forward her a copy of those if she would like.  Let me know if she has other concerns.  Thanks.

## 2022-06-03 ENCOUNTER — CLINICAL SUPPORT (OUTPATIENT)
Dept: FAMILY MEDICINE CLINIC | Age: 77
End: 2022-06-03

## 2022-06-03 VITALS — HEART RATE: 69 BPM | SYSTOLIC BLOOD PRESSURE: 143 MMHG | DIASTOLIC BLOOD PRESSURE: 81 MMHG

## 2022-06-03 DIAGNOSIS — I10 ESSENTIAL HYPERTENSION: Primary | ICD-10-CM

## 2022-06-04 NOTE — PROGRESS NOTES
Vitals:    06/03/22 1012   BP: 143/81   BP Location: Left arm   Patient Position: Sitting   Pulse: 69     Please let Tish know that her blood pressure is continuing to run a bit above normal.  Confirm if she is checking her blood pressure at home any, and if so, what her readings are.  I am leaning toward adding medication for her blood pressure.  We try to get the pressure in the 120s or low 130s most of the time in order to help minimize the risk of heart and stroke issues.  Let me know if she has other concerns.  Thanks.

## 2022-06-09 RX ORDER — LOSARTAN POTASSIUM 25 MG/1
25 TABLET ORAL DAILY
Qty: 90 TABLET | Refills: 1 | Status: SHIPPED | OUTPATIENT
Start: 2022-06-09 | End: 2022-11-14 | Stop reason: SDUPTHER

## 2022-06-09 NOTE — PROGRESS NOTES
Please let Tish know that I would recommend moving ahead with additional blood pressure medication to try to help protect her kidneys and potentially decrease the risk of stroke and heart attack over time.  I would like her to continue atenolol at its current level.  However, I have sent a prescription for a low-dose of losartan to [facility] for her.  It can take a few weeks to see the full impact of this medication.  I would recommend she stop back in the allergy room again in about a month for a blood pressure check.  Please TICKLE to remind her to do this.  Also, there is some potential for the medication to impact her kidney numbers.  Please TICKLE for basic metabolic panel in about a month as well.  Let me know if she has other concerns.  Thanks.   Discharged

## 2022-06-30 DIAGNOSIS — J30.9 ALLERGIC RHINITIS, UNSPECIFIED SEASONALITY, UNSPECIFIED TRIGGER: ICD-10-CM

## 2022-06-30 RX ORDER — MONTELUKAST SODIUM 10 MG/1
10 TABLET ORAL NIGHTLY
Qty: 90 TABLET | Refills: 0 | Status: SHIPPED | OUTPATIENT
Start: 2022-06-30 | End: 2022-09-29

## 2022-06-30 NOTE — TELEPHONE ENCOUNTER
Caller: Adonay Tish TRONCOSO    Relationship: Self    Best call back number: [phone]  Requested Prescriptions:   Requested Prescriptions     Pending Prescriptions Disp Refills   • montelukast (Singulair) 10 MG tablet 90 tablet 3     Sig: Take 1 tablet by mouth Every Night.        Pharmacy where request should be sent: [facility] [place], [address] [phone]  - [phone] FX     Additional details provided by patient: PATIENT STATED REQUEST FOR 90 DAY PRESCRIPTION.  SHE IS REQUESTING A CALL TO SEE IF THE OTHER MEDICATIONS THAT WERE RECENTLY SENT TO THIS NEW PHARMACY WERE 90 DAY REFILLS.   Does the patient have less than a 3 day supply:  [] Yes  [x] No    Sadi PEREZ Rep   06/30/22 10:10 EDT

## 2022-07-04 RX ORDER — DOXYCYCLINE 100 MG/1
100 TABLET ORAL 2 TIMES DAILY
Qty: 20 TABLET | Refills: 0 | Status: SHIPPED | OUTPATIENT
Start: 2022-07-04 | End: 2022-07-26

## 2022-07-11 ENCOUNTER — TELEPHONE (OUTPATIENT)
Dept: FAMILY MEDICINE CLINIC | Age: 77
End: 2022-07-11

## 2022-07-11 DIAGNOSIS — I10 ESSENTIAL HYPERTENSION: Primary | ICD-10-CM

## 2022-07-11 NOTE — TELEPHONE ENCOUNTER
----- Message from Nereida Jean LPN sent at 6/9/2022  9:54 AM EDT -----  TICKLE to remind her to do this.  Also, there is some potential for the medication to impact her kidney numbers.  Please TICKLE for basic metabolic panel in about a month as well.

## 2022-07-19 ENCOUNTER — TELEPHONE (OUTPATIENT)
Dept: FAMILY MEDICINE CLINIC | Age: 77
End: 2022-07-19

## 2022-07-19 NOTE — TELEPHONE ENCOUNTER
----- Message from Luciana Barriga LPN sent at 7/18/2022  8:16 AM EDT -----      ----- Message -----  From: SYSTEM  Sent: 7/16/2022   1:16 AM EDT  To: [facility] [place]

## 2022-07-26 ENCOUNTER — LAB (OUTPATIENT)
Dept: LAB | Facility: HOSPITAL | Age: 77
End: 2022-07-26

## 2022-07-26 ENCOUNTER — OFFICE VISIT (OUTPATIENT)
Dept: FAMILY MEDICINE CLINIC | Age: 77
End: 2022-07-26

## 2022-07-26 VITALS
WEIGHT: 164 LBS | HEIGHT: 63 IN | TEMPERATURE: 98.6 F | BODY MASS INDEX: 29.06 KG/M2 | DIASTOLIC BLOOD PRESSURE: 81 MMHG | SYSTOLIC BLOOD PRESSURE: 145 MMHG | HEART RATE: 74 BPM

## 2022-07-26 DIAGNOSIS — I10 ESSENTIAL HYPERTENSION: ICD-10-CM

## 2022-07-26 DIAGNOSIS — R21 RASH AND NONSPECIFIC SKIN ERUPTION: Primary | ICD-10-CM

## 2022-07-26 PROCEDURE — 99213 OFFICE O/P EST LOW 20 MIN: CPT

## 2022-07-26 RX ORDER — CLOTRIMAZOLE AND BETAMETHASONE DIPROPIONATE 10; .64 MG/G; MG/G
1 CREAM TOPICAL 2 TIMES DAILY
Qty: 28 G | Refills: 0 | Status: SHIPPED | OUTPATIENT
Start: 2022-07-26 | End: 2022-08-09

## 2022-07-26 NOTE — PROGRESS NOTES
"Chief Complaint  Rash (Under right breast)    Subjective        Tish Urias presents to [facility]  History of Present Illness    Patient presents today with a rash under her right breast.  She states the last few days has become itchy.  It has not worsened, she denies any drainage, bleeding, pain.  she changed her detergent recently which she thinks sparked the rash however after she realized that and she went back to her old detergent and washed all of her clothing, bedding.    She also notes a recent staph infection to her face diagnosed per Dr. Coto.  She completed a course of doxycycline and her staph infection has resolved.  She does not feel that the rash under her breast and her face are related.    Patient reports she has tried antibiotic ointment, Desitin, Benadryl cream and hydrocortisone for the rash under her right breast with no improvement.  She denies any fever or chills.    Blood pressure is elevated in clinic today.  Patient is prescribed atenolol and losartan.  She is not taking the losartan because she does not think her blood pressure is that high. She is monitoring blood pressure at home, she showed me a few of her values and there were a few systolic blood pressures 140-150 but nothing higher than 150 systolic.  She denies any chest pain, shortness of breath, headache, visual changes.    Objective   Vital Signs:  /81 (BP Location: Right arm, Patient Position: Sitting)   Pulse 74   Temp 98.6 °F (37 °C) (Oral)   Ht 158.8 cm (62.52\")   Wt 74.4 kg (164 lb)   BMI 29.50 kg/m²   Estimated body mass index is 29.5 kg/m² as calculated from the following:    Height as of this encounter: 158.8 cm (62.52\").    Weight as of this encounter: 74.4 kg (164 lb).      Physical Exam  Vitals and nursing note reviewed.   Constitutional:       General: She is not in acute distress.     Appearance: Normal appearance. She is not ill-appearing.   HENT:      Head: " Normocephalic and atraumatic.   Cardiovascular:      Rate and Rhythm: Normal rate and regular rhythm.      Pulses: Normal pulses.      Heart sounds: Normal heart sounds.   Pulmonary:      Effort: Pulmonary effort is normal.      Breath sounds: Normal breath sounds.   Skin:         Neurological:      Mental Status: She is alert.                Assessment and Plan   Diagnoses and all orders for this visit:    1. Rash and nonspecific skin eruption (Primary)  Assessment & Plan:  Lotrisone as prescribed. Keep area clean and dry. Follow up in clinic if area worsens or does not improve.     Orders:  -     clotrimazole-betamethasone (Lotrisone) 1-0.05 % cream; Apply 1 application topically to the appropriate area as directed 2 (Two) Times a Day for 14 days.  Dispense: 28 g; Refill: 0    2. Essential hypertension  Assessment & Plan:  Patient states that she will not take losartan. She was advised that her BP is higher than recommended as Dr. Coto would like a goal SBP for her of 120-130.  She is monitoring her blood pressure at home, I advised her to continue doing this, encouraged/educated her to take the losartan as prescribed to help prevent future complications and made her aware of complications of hypertension.    She is to follow-up with her PCP regarding her losartan and ambulatory monitoring values. She was instructed to go to ER for any chest pain, shortness of breath, persistent headache or strokelike symptoms.    Patient voiced understanding of all instructions and had no further questions at this time.       Follow Up   Return if symptoms worsen or fail to improve, for Next scheduled follow up.  Patient was given instructions and counseling regarding her condition or for health maintenance advice. Please see specific information pulled into the AVS if appropriate.

## 2022-07-26 NOTE — ASSESSMENT & PLAN NOTE
Patient states that she will not take losartan. She was advised that her BP is higher than recommended as Dr. Coto would like a goal SBP for her of 120-130.  She is monitoring her blood pressure at home, I advised her to continue doing this, encouraged/educated her to take the losartan as prescribed to help prevent future complications and made her aware of complications of hypertension.    She is to follow-up with her PCP regarding her losartan and ambulatory monitoring values. She was instructed to go to ER for any chest pain, shortness of breath, persistent headache or strokelike symptoms.

## 2022-07-27 ENCOUNTER — LAB (OUTPATIENT)
Dept: LAB | Facility: HOSPITAL | Age: 77
End: 2022-07-27

## 2022-07-27 DIAGNOSIS — I10 ESSENTIAL HYPERTENSION: ICD-10-CM

## 2022-07-27 PROBLEM — R21 RASH AND NONSPECIFIC SKIN ERUPTION: Status: ACTIVE | Noted: 2022-07-27

## 2022-07-27 LAB
ANION GAP SERPL CALCULATED.3IONS-SCNC: 10.2 MMOL/L (ref 5–15)
BUN SERPL-MCNC: 20 MG/DL (ref 8–23)
BUN/CREAT SERPL: 18.9 (ref 7–25)
CALCIUM SPEC-SCNC: 9 MG/DL (ref 8.6–10.5)
CHLORIDE SERPL-SCNC: 103 MMOL/L (ref 98–107)
CO2 SERPL-SCNC: 27.8 MMOL/L (ref 22–29)
CREAT SERPL-MCNC: 1.06 MG/DL (ref 0.57–1)
EGFRCR SERPLBLD CKD-EPI 2021: 55.2 ML/MIN/1.73
GLUCOSE SERPL-MCNC: 87 MG/DL (ref 65–99)
POTASSIUM SERPL-SCNC: 4.3 MMOL/L (ref 3.5–5.2)
SODIUM SERPL-SCNC: 141 MMOL/L (ref 136–145)

## 2022-07-27 PROCEDURE — 36415 COLL VENOUS BLD VENIPUNCTURE: CPT

## 2022-07-27 PROCEDURE — 80048 BASIC METABOLIC PNL TOTAL CA: CPT

## 2022-07-27 NOTE — ASSESSMENT & PLAN NOTE
Lotrisone as prescribed. Keep area clean and dry. Follow up in clinic if area worsens or does not improve.

## 2022-09-29 DIAGNOSIS — J30.9 ALLERGIC RHINITIS, UNSPECIFIED SEASONALITY, UNSPECIFIED TRIGGER: ICD-10-CM

## 2022-09-29 RX ORDER — MONTELUKAST SODIUM 10 MG/1
TABLET ORAL
Qty: 90 TABLET | Refills: 0 | Status: SHIPPED | OUTPATIENT
Start: 2022-09-29 | End: 2022-11-14 | Stop reason: SDUPTHER

## 2022-11-14 ENCOUNTER — OFFICE VISIT (OUTPATIENT)
Dept: FAMILY MEDICINE CLINIC | Age: 77
End: 2022-11-14

## 2022-11-14 ENCOUNTER — HOSPITAL ENCOUNTER (OUTPATIENT)
Dept: GENERAL RADIOLOGY | Facility: HOSPITAL | Age: 77
Discharge: HOME | End: 2022-11-14

## 2022-11-14 ENCOUNTER — LAB (OUTPATIENT)
Dept: LAB | Facility: HOSPITAL | Age: 77
End: 2022-11-14

## 2022-11-14 VITALS
HEART RATE: 70 BPM | HEIGHT: 63 IN | BODY MASS INDEX: 29.45 KG/M2 | WEIGHT: 166.2 LBS | DIASTOLIC BLOOD PRESSURE: 80 MMHG | SYSTOLIC BLOOD PRESSURE: 135 MMHG

## 2022-11-14 DIAGNOSIS — R20.2 ARM PARESTHESIA, LEFT: Primary | ICD-10-CM

## 2022-11-14 DIAGNOSIS — J30.9 ALLERGIC RHINITIS, UNSPECIFIED SEASONALITY, UNSPECIFIED TRIGGER: ICD-10-CM

## 2022-11-14 DIAGNOSIS — G50.0 TRIGEMINAL NEURALGIA: ICD-10-CM

## 2022-11-14 DIAGNOSIS — R20.2 ARM PARESTHESIA, LEFT: ICD-10-CM

## 2022-11-14 DIAGNOSIS — E03.9 ACQUIRED HYPOTHYROIDISM: ICD-10-CM

## 2022-11-14 DIAGNOSIS — M54.2 NECK PAIN: ICD-10-CM

## 2022-11-14 DIAGNOSIS — M43.12 ANTEROLISTHESIS OF CERVICAL SPINE: ICD-10-CM

## 2022-11-14 DIAGNOSIS — Z12.31 BREAST CANCER SCREENING BY MAMMOGRAM: ICD-10-CM

## 2022-11-14 DIAGNOSIS — Z79.899 OTHER LONG TERM (CURRENT) DRUG THERAPY: ICD-10-CM

## 2022-11-14 DIAGNOSIS — R29.2: ICD-10-CM

## 2022-11-14 DIAGNOSIS — E78.2 MIXED HYPERLIPIDEMIA: ICD-10-CM

## 2022-11-14 DIAGNOSIS — I10 ESSENTIAL HYPERTENSION: ICD-10-CM

## 2022-11-14 DIAGNOSIS — M50.30 DEGENERATIVE DISC DISEASE, CERVICAL: ICD-10-CM

## 2022-11-14 DIAGNOSIS — K21.9 GASTROESOPHAGEAL REFLUX DISEASE, UNSPECIFIED WHETHER ESOPHAGITIS PRESENT: ICD-10-CM

## 2022-11-14 DIAGNOSIS — Z78.0 ASYMPTOMATIC POSTMENOPAUSAL STATE: ICD-10-CM

## 2022-11-14 LAB
ALBUMIN SERPL-MCNC: 4.1 G/DL (ref 3.5–5.2)
ALBUMIN/GLOB SERPL: 1.6 G/DL
ALP SERPL-CCNC: 108 U/L (ref 39–117)
ALT SERPL W P-5'-P-CCNC: 11 U/L (ref 1–33)
ANION GAP SERPL CALCULATED.3IONS-SCNC: 6.9 MMOL/L (ref 5–15)
AST SERPL-CCNC: 20 U/L (ref 1–32)
BILIRUB SERPL-MCNC: 0.3 MG/DL (ref 0–1.2)
BUN SERPL-MCNC: 18 MG/DL (ref 8–23)
BUN/CREAT SERPL: 16.1 (ref 7–25)
CALCIUM SPEC-SCNC: 9.1 MG/DL (ref 8.6–10.5)
CARBAMAZEPINE SERPL-MCNC: 3.3 MCG/ML (ref 4–12)
CHLORIDE SERPL-SCNC: 102 MMOL/L (ref 98–107)
CHOLEST SERPL-MCNC: 239 MG/DL (ref 0–200)
CO2 SERPL-SCNC: 28.1 MMOL/L (ref 22–29)
CREAT SERPL-MCNC: 1.12 MG/DL (ref 0.57–1)
DEPRECATED RDW RBC AUTO: 44.8 FL (ref 37–54)
EGFRCR SERPLBLD CKD-EPI 2021: 51.7 ML/MIN/1.73
ERYTHROCYTE [DISTWIDTH] IN BLOOD BY AUTOMATED COUNT: 13.3 % (ref 12.3–15.4)
ERYTHROCYTE [SEDIMENTATION RATE] IN BLOOD: 12 MM/HR (ref 0–30)
GLOBULIN UR ELPH-MCNC: 2.5 GM/DL
GLUCOSE SERPL-MCNC: 86 MG/DL (ref 65–99)
HCT VFR BLD AUTO: 38.3 % (ref 34–46.6)
HDLC SERPL-MCNC: 45 MG/DL (ref 40–60)
HGB BLD-MCNC: 12.4 G/DL (ref 12–15.9)
LDLC SERPL CALC-MCNC: 139 MG/DL (ref 0–100)
LDLC/HDLC SERPL: 2.96 {RATIO}
MAGNESIUM SERPL-MCNC: 1.9 MG/DL (ref 1.6–2.4)
MCH RBC QN AUTO: 29.4 PG (ref 26.6–33)
MCHC RBC AUTO-ENTMCNC: 32.4 G/DL (ref 31.5–35.7)
MCV RBC AUTO: 90.8 FL (ref 79–97)
PLATELET # BLD AUTO: 210 10*3/MM3 (ref 140–450)
PMV BLD AUTO: 8.5 FL (ref 6–12)
POTASSIUM SERPL-SCNC: 4.7 MMOL/L (ref 3.5–5.2)
PROT SERPL-MCNC: 6.6 G/DL (ref 6–8.5)
RBC # BLD AUTO: 4.22 10*6/MM3 (ref 3.77–5.28)
SODIUM SERPL-SCNC: 137 MMOL/L (ref 136–145)
T4 FREE SERPL-MCNC: 1.3 NG/DL (ref 0.93–1.7)
TRIGL SERPL-MCNC: 305 MG/DL (ref 0–150)
TSH SERPL DL<=0.05 MIU/L-ACNC: 3.48 UIU/ML (ref 0.27–4.2)
VLDLC SERPL-MCNC: 55 MG/DL (ref 5–40)
WBC NRBC COR # BLD: 6.5 10*3/MM3 (ref 3.4–10.8)

## 2022-11-14 PROCEDURE — 80061 LIPID PANEL: CPT

## 2022-11-14 PROCEDURE — 80053 COMPREHEN METABOLIC PANEL: CPT

## 2022-11-14 PROCEDURE — 72040 X-RAY EXAM NECK SPINE 2-3 VW: CPT

## 2022-11-14 PROCEDURE — 84439 ASSAY OF FREE THYROXINE: CPT

## 2022-11-14 PROCEDURE — 85027 COMPLETE CBC AUTOMATED: CPT

## 2022-11-14 PROCEDURE — 80156 ASSAY CARBAMAZEPINE TOTAL: CPT

## 2022-11-14 PROCEDURE — 85652 RBC SED RATE AUTOMATED: CPT

## 2022-11-14 PROCEDURE — 36415 COLL VENOUS BLD VENIPUNCTURE: CPT

## 2022-11-14 PROCEDURE — 84443 ASSAY THYROID STIM HORMONE: CPT

## 2022-11-14 PROCEDURE — 99214 OFFICE O/P EST MOD 30 MIN: CPT | Performed by: FAMILY MEDICINE

## 2022-11-14 PROCEDURE — 83735 ASSAY OF MAGNESIUM: CPT

## 2022-11-14 RX ORDER — LEVOTHYROXINE SODIUM 88 UG/1
88 TABLET ORAL DAILY
Qty: 90 TABLET | Refills: 1 | Status: SHIPPED | OUTPATIENT
Start: 2022-11-14

## 2022-11-14 RX ORDER — ZOSTER VACCINE RECOMBINANT, ADJUVANTED 50 MCG/0.5
KIT INTRAMUSCULAR
COMMUNITY
Start: 2022-08-24 | End: 2022-11-14

## 2022-11-14 RX ORDER — LOSARTAN POTASSIUM 25 MG/1
25 TABLET ORAL DAILY
Qty: 90 TABLET | Refills: 1 | Status: SHIPPED | OUTPATIENT
Start: 2022-11-14

## 2022-11-14 RX ORDER — OMEPRAZOLE 20 MG/1
20 CAPSULE, DELAYED RELEASE ORAL DAILY
Qty: 90 CAPSULE | Refills: 1 | Status: SHIPPED | OUTPATIENT
Start: 2022-11-14

## 2022-11-14 RX ORDER — CARBAMAZEPINE 100 MG/1
100 CAPSULE, EXTENDED RELEASE ORAL NIGHTLY
Qty: 90 CAPSULE | Refills: 1 | Status: SHIPPED | OUTPATIENT
Start: 2022-11-14

## 2022-11-14 RX ORDER — MONTELUKAST SODIUM 10 MG/1
10 TABLET ORAL NIGHTLY
Qty: 90 TABLET | Refills: 1 | Status: SHIPPED | OUTPATIENT
Start: 2022-11-14

## 2022-11-14 RX ORDER — ATENOLOL 50 MG/1
50 TABLET ORAL DAILY
Qty: 90 TABLET | Refills: 1 | Status: SHIPPED | OUTPATIENT
Start: 2022-11-14

## 2022-11-14 NOTE — PROGRESS NOTES
"Tish Urias presents to [facility].    Chief Complaint:  Arms tingling    Subjective       History of Present Illness:  Tish is in today for follow-up on her care.  She has noted worsening tingling in both arms over the last 6 weeks or so.  She has had previous carpal tunnel surgery of the right hand.  This tingling is bothering her especially at night and making it difficult for her to sleep.  She says the whole left arm tingles for her.  She will occasionally have an issue with the right arm, but is more significant on the left.  She has not previously had surgery on the cervical spine.  She did have previous surgery of the left shoulder though.  She has had some issues with neck pain in the past though and has some mild limitation with range of motion.    Review of Systems:  Review of Systems   Constitutional: Negative for chills and fever.   Respiratory: Negative for cough and shortness of breath.    Cardiovascular: Negative for chest pain and palpitations.   Gastrointestinal: Negative for abdominal pain, nausea and vomiting.   Neurological: Negative for speech difficulty, weakness and headache.        Objective   Medical History:  Past Medical History:   • Allergic rhinitis   • Anemia   • Anesthesia complication    WITH CARPAL TUNNEL RELEASE, \"RELAXING MEDICINE\" HAD ISSUES WITH ITCHING\"   • Arthritis   • Cholelithiasis   • GERD (gastroesophageal reflux disease)   • History of anemia   • History of glaucoma   • History of osteoporosis   • History of varicose veins    WITH TORI LEG VEIN STRIPPING   • Hyperlipidemia   • Hypertension   • Hypothyroidism   • Left shoulder pain   • Rotator cuff disorder, left   • Sinus infection    WITH COUGH. RECOVERING FROM. WAS TREATED WITH ZPAC   • Trigeminal neuralgia   • Yeast infection    SCALP. BEEN TREATED. RESOLVED     Past Surgical History:   • CARPAL TUNNEL RELEASE   • CATARACT EXTRACTION    LEFT AND RIGHT   • CHOLECYSTECTOMY   • " CHOLECYSTECTOMY WITH INTRAOPERATIVE CHOLANGIOGRAM    Procedure: CHOLECYSTECTOMY LAPAROSCOPIC INTRAOPERATIVE CHOLANGIOGRAM;  Surgeon: Chaitanya Elizabeth MD;  Location: [facility] [place];  Service:    • [facility]ONOSCOPY    [facility]    • ENDOSCOPY    Procedure: ESOPHAGOGASTRODUODENOSCOPY WITH COLD BIOPSIES;  Surgeon: Shaheen Andre MD;  Location: [facility] ENDOSCOPY;  Service: gastritis   • ERCP    Jes CHISHOLM   Sphincterotopy performed, biliary tree swept, choledocholithiasis   • GA ERCP DX COLLECTION SPECIMEN BRUSHING/WASHING    Procedure: ENDOSCOPIC RETROGRADE CHOLANGIOPANCREATOGRAPHY WITH SPHINCTEROTOMY AND BALLOON SWEEP AND STONE EXTRACTION;  Surgeon: Shaheen Andre MD;  Location: [facility] ENDOSCOPY;  Service: Gastroenterology   • SHOULDER ARTHROSCOPY W/ ROTATOR CUFF REPAIR    Procedure: LEFT SHOULDER ARTHROSCOPY, BICEPS TENOTOMY, DEBRIDEMENT OF ROTATOR CUFF AND LABRUM;  Surgeon: Yoseph Galvan MD;  Location: [facility];  Service: Orthopedics   • TONSILLECTOMY   • TOTAL SHOULDER ARTHROPLASTY W/ DISTAL CLAVICLE EXCISION    Procedure: LEFT TOTAL SHOULDER REVERSE ARTHROPLASTY;  Surgeon: Yoseph Galvan MD;  Location: [facility] OR [facility];  Service: Orthopedics   • VARICOSE VEIN SURGERY    LEFT AND RIGHT      Family History   Problem Relation Age of Onset   • Malig Hyperthermia Neg Hx      Social History     Tobacco Use   • Smoking status: Never   • Smokeless tobacco: Never   Substance Use Topics   • Alcohol use: No       Health Maintenance Due   Topic Date Due   • TDAP/TD VACCINES (1 - Tdap) Never done   • DXA SCAN  10/07/2021   • LIPID PANEL  02/23/2022   • Pneumococcal Vaccine 65+ (2 - PCV) 08/10/2022        Immunization History   Administered Date(s) Administered   • COVID-19 (MODERNA) 1st, 2nd, 3rd Dose Only 02/23/2021, 03/26/2021, 12/28/2021   • Pneumococcal Polysaccharide (PPSV23) 08/10/2021   • Shingrix 09/20/2021, 08/24/2022   • Zostavax 10/18/2012       Allergies   Allergen Reactions   •  "Penicillins Rash        Medications:  Current Outpatient Medications on File Prior to Visit   Medication Sig   • b complex-C-folic acid 1 MG capsule Take 1 capsule by mouth Daily.   • cholecalciferol (VITAMIN D3) 1000 UNITS tablet Take 1,000 Units by mouth Daily.   • Ketotifen Fumarate (ALAWAY OP) Apply 1 drop to eye(s) as directed by provider 2 (Two) Times a Day As Needed (FOR ALLERGIES).   • latanoprost (XALATAN) 0.005 % ophthalmic solution Administer 1 drop to both eyes Every Night.   • loratadine (CLARITIN) 10 MG tablet Take 10 mg by mouth Daily.   • [DISCONTINUED] atenolol (TENORMIN) 50 MG tablet Take 1 tablet by mouth Daily.   • [DISCONTINUED] carBAMazepine (CARBATROL) 100 MG 12 hr capsule Take 1 capsule by mouth Every Night.   • [DISCONTINUED] levothyroxine (Euthyrox) 88 MCG tablet Take 1 tablet by mouth Daily.   • [DISCONTINUED] losartan (COZAAR) 25 MG tablet Take 1 tablet by mouth Daily.   • [DISCONTINUED] omeprazole (priLOSEC) 20 MG capsule Take 1 capsule by mouth Daily.   • LOTEMAX 0.5 % gel ophthalmic gel Administer 1 drop to both eyes 4 (Four) Times a Day As Needed (ALLERIGIES).   • mupirocin (BACTROBAN) 2 % nasal ointment into the nostril(s) as directed by provider 2 (Two) Times a Day.   • [DISCONTINUED] montelukast (SINGULAIR) 10 MG tablet TAKE ONE (1) TABLET BY MOUTH EVERY NIGHT.   • [DISCONTINUED] Shingrix 50 MCG/0.5ML reconstituted suspension      No current facility-administered medications on file prior to visit.       Vital Signs:   /80 (BP Location: Right arm, Patient Position: Sitting)   Pulse 70   Ht 158.8 cm (62.52\")   Wt 75.4 kg (166 lb 3.2 oz)   BMI 29.89 kg/m²       Physical Exam:  Physical Exam  Vitals and nursing note reviewed.   Constitutional:       General: She is not in acute distress.     Appearance: She is not ill-appearing.   HENT:      Right Ear: Tympanic membrane and ear canal normal.      Left Ear: Tympanic membrane and ear canal normal.      Mouth/Throat:      " Mouth: Mucous membranes are moist.      Comments: Pharynx appears normal  Eyes:      Extraocular Movements: Extraocular movements intact.      Pupils: Pupils are equal, round, and reactive to light.   Neck:      Thyroid: No thyromegaly.      Comments: There is mildly diminished range of motion with the neck at the extremes with rotation.  This is more prominent toward the right.  Cardiovascular:      Rate and Rhythm: Normal rate and regular rhythm.      Heart sounds: No murmur heard.  Pulmonary:      Effort: Pulmonary effort is normal.      Breath sounds: Normal breath sounds.   Abdominal:      General: There is no distension.      Palpations: Abdomen is soft. There is no mass.      Tenderness: There is no abdominal tenderness.   Skin:     Findings: No lesion or rash.   Neurological:      General: No focal deficit present.      Mental Status: She is oriented to person, place, and time.      Cranial Nerves: No cranial nerve deficit.      Motor: No weakness ( strength seems relatively preserved).      Deep Tendon Reflexes: Reflexes abnormal (There is some diminished biceps reflex on the left compared to the right.).   Psychiatric:         Mood and Affect: Mood normal.         Result Review      The following data was reviewed by Samir Coto MD on 11/14/2022.  Lab Results   Component Value Date    WBC 6.86 04/29/2022    HGB 13.0 04/29/2022    HCT 40.3 04/29/2022    MCV 90.6 04/29/2022     04/29/2022     Lab Results   Component Value Date    GLUCOSE 87 07/27/2022    BUN 20 07/27/2022    CREATININE 1.06 (H) 07/27/2022     07/27/2022    K 4.3 07/27/2022     07/27/2022    CO2 27.8 07/27/2022    CALCIUM 9.0 07/27/2022    PROTEINTOT 7.0 04/29/2022    ALBUMIN 4.20 04/29/2022    ALT 14 04/29/2022    AST 21 04/29/2022    ALKPHOS 96 04/29/2022    BILITOT 0.3 04/29/2022    EGFRIFNONA 51 (L) 08/10/2021    GLOB 2.8 04/29/2022    AGRATIO 1.5 04/29/2022    BCR 18.9 07/27/2022    ANIONGAP 10.2  07/27/2022      Lab Results   Component Value Date    CHLPL 267 (H) 02/23/2021    TRIG 213 (H) 02/23/2021    HDL 54 02/23/2021     (H) 02/23/2021     Lab Results   Component Value Date    TSH 2.360 04/29/2022     No results found for: HGBA1C         Assessment and Plan:   Today, we have reviewed Tish's care.  Overall, she is doing well, but this tingling issue is really giving her difficulty.  There is no evidence to suggest a stroke or more sinister problem.  We will evaluate her as noted below and then consider whether to move ahead with an MRI of the cervical spine or possibly an EMG.  We will see what the testing shows and then reach back out to her.  Her usual medications have been refilled as noted.  She declines most vaccines today.       Diagnoses and all orders for this visit:    1. Arm paresthesia, left (Primary)  -     XR Spine Cervical 2 or 3 View; Future  -     Magnesium; Future  -     Sedimentation Rate; Future    2. Neck pain  -     XR Spine Cervical 2 or 3 View; Future  -     Sedimentation Rate; Future    3. Essential hypertension  -     Comprehensive Metabolic Panel; Future  -     atenolol (TENORMIN) 50 MG tablet; Take 1 tablet by mouth Daily.  Dispense: 90 tablet; Refill: 1  -     losartan (COZAAR) 25 MG tablet; Take 1 tablet by mouth Daily.  Dispense: 90 tablet; Refill: 1  -     Magnesium; Future    4. Acquired hypothyroidism  -     TSH+Free T4; Future  -     levothyroxine (Euthyrox) 88 MCG tablet; Take 1 tablet by mouth Daily.  Dispense: 90 tablet; Refill: 1    5. Mixed hyperlipidemia  -     Comprehensive Metabolic Panel; Future  -     Lipid Panel; Future    6. Other long term (current) drug therapy  -     CBC (No Diff); Future  -     Carbamazepine level, total; Future    7. Trigeminal neuralgia  -     Carbamazepine level, total; Future  -     carBAMazepine (CARBATROL) 100 MG 12 hr capsule; Take 1 capsule by mouth Every Night.  Dispense: 90 capsule; Refill: 1    8. Gastroesophageal reflux  disease, unspecified whether esophagitis present  -     omeprazole (priLOSEC) 20 MG capsule; Take 1 capsule by mouth Daily.  Dispense: 90 capsule; Refill: 1    9. Allergic rhinitis, unspecified seasonality, unspecified trigger  -     montelukast (SINGULAIR) 10 MG tablet; Take 1 tablet by mouth Every Night.  Dispense: 90 tablet; Refill: 1    10. Breast cancer screening by mammogram  -     Mammo Screening Digital Tomosynthesis Bilateral With CAD; Future    11. Asymptomatic postmenopausal state  -     DEXA Bone Density Axial; Future        Follow Up   Return if symptoms worsen or fail to improve.  Patient was given instructions and counseling regarding her condition or for health maintenance advice. Please see specific information pulled into the AVS if appropriate.

## 2022-11-15 RX ORDER — PREDNISONE 20 MG/1
TABLET ORAL
Qty: 11 TABLET | Refills: 0 | Status: SHIPPED | OUTPATIENT
Start: 2022-11-15 | End: 2023-02-20

## 2022-12-05 ENCOUNTER — HOSPITAL ENCOUNTER (OUTPATIENT)
Dept: MRI IMAGING | Facility: HOSPITAL | Age: 77
Discharge: HOME | End: 2022-12-05
Admitting: FAMILY MEDICINE

## 2022-12-05 DIAGNOSIS — M50.30 DEGENERATIVE DISC DISEASE, CERVICAL: ICD-10-CM

## 2022-12-05 DIAGNOSIS — M54.2 NECK PAIN: ICD-10-CM

## 2022-12-05 DIAGNOSIS — R29.2: ICD-10-CM

## 2022-12-05 DIAGNOSIS — M43.12 ANTEROLISTHESIS OF CERVICAL SPINE: ICD-10-CM

## 2022-12-05 DIAGNOSIS — R20.2 ARM PARESTHESIA, LEFT: ICD-10-CM

## 2022-12-05 PROCEDURE — 72141 MRI NECK SPINE W/O DYE: CPT

## 2023-01-18 ENCOUNTER — HOSPITAL ENCOUNTER (OUTPATIENT)
Dept: MAMMOGRAPHY | Facility: HOSPITAL | Age: 78
Discharge: HOME | End: 2023-01-18
Payer: MEDICARE

## 2023-01-18 ENCOUNTER — HOSPITAL ENCOUNTER (OUTPATIENT)
Dept: BONE DENSITY | Facility: HOSPITAL | Age: 78
Discharge: HOME | End: 2023-01-18
Payer: MEDICARE

## 2023-01-18 DIAGNOSIS — Z78.0 ASYMPTOMATIC POSTMENOPAUSAL STATE: ICD-10-CM

## 2023-01-18 DIAGNOSIS — Z12.31 BREAST CANCER SCREENING BY MAMMOGRAM: ICD-10-CM

## 2023-01-18 PROCEDURE — 77063 BREAST TOMOSYNTHESIS BI: CPT

## 2023-01-18 PROCEDURE — 77067 SCR MAMMO BI INCL CAD: CPT

## 2023-01-18 PROCEDURE — 77080 DXA BONE DENSITY AXIAL: CPT

## 2023-02-20 ENCOUNTER — OFFICE VISIT (OUTPATIENT)
Dept: FAMILY MEDICINE CLINIC | Age: 78
End: 2023-02-20
Payer: MEDICARE

## 2023-02-20 VITALS
TEMPERATURE: 98.1 F | OXYGEN SATURATION: 97 % | HEART RATE: 82 BPM | BODY MASS INDEX: 29.7 KG/M2 | HEIGHT: 63 IN | SYSTOLIC BLOOD PRESSURE: 128 MMHG | WEIGHT: 167.6 LBS | DIASTOLIC BLOOD PRESSURE: 68 MMHG

## 2023-02-20 DIAGNOSIS — G89.29 CHRONIC RIGHT SHOULDER PAIN: Primary | ICD-10-CM

## 2023-02-20 DIAGNOSIS — M54.2 NECK PAIN: ICD-10-CM

## 2023-02-20 DIAGNOSIS — M25.511 CHRONIC RIGHT SHOULDER PAIN: Primary | ICD-10-CM

## 2023-02-20 PROCEDURE — 99213 OFFICE O/P EST LOW 20 MIN: CPT | Performed by: NURSE PRACTITIONER

## 2023-02-20 NOTE — PROGRESS NOTES
"Chief Complaint  Tish Urias presents to [facility] for Shoulder Pain (States its been ongoing, pt has massage therapist, but has not helped.  Hurting more than usual. )      Subjective     History of Present Illness  Right shoulder has been causing pain/ stiffness. Started several months ago.  She is under the care of a therapist- message PRN but has not been effective.  She has been taking ibuprofen 200-400 occasionally and tylenol PRN.  The pain is worst when rotating her head to the affected shoulder and when lying on the affected side.  She has had a shoulder replacement on her left .  No new numbness or tingling.  No limits in ROM.  Her previous orthopedic is Dr Galvan in [place].  She does not wish to go through PT unless ortho recommends        Assessment and Plan       Diagnoses and all orders for this visit:    1. Chronic right shoulder pain (Primary)  Comments:  suspect that PT will improve- however, she would like to refer to ortho Dr. Galvan for further recommedations based on her previous left shoulder treatment  Orders:  -     Ambulatory Referral to Orthopedic Surgery    2. Neck pain  Comments:  recommend PT- declines until seen by ortho- continue tylenol PRN declines muscle relaxer         Follow Up   Return in about 8 weeks (around 4/17/2023), or if symptoms worsen or fail to improve.      No orders of the defined types were placed in this encounter.      There are no discontinued medications.         Review of Systems    Objective     Vitals:    02/20/23 1507   BP: 128/68   BP Location: Left arm   Patient Position: Sitting   Cuff Size: Adult   Pulse: 82   Temp: 98.1 °F (36.7 °C)   TempSrc: Oral   SpO2: 97%   Weight: 76 kg (167 lb 9.6 oz)   Height: 158.8 cm (62.52\")     Body mass index is 30.15 kg/m².     Physical Exam  Constitutional:       General: She is not in acute distress.     Appearance: Normal appearance.   HENT:      Head: Normocephalic. " "  Cardiovascular:      Rate and Rhythm: Normal rate and regular rhythm.   Pulmonary:      Effort: Pulmonary effort is normal.      Breath sounds: Normal breath sounds.   Musculoskeletal:      Cervical back: Pain with movement (with rotation to the right) present. Decreased range of motion.   Neurological:      General: No focal deficit present.      Mental Status: She is alert and oriented to person, place, and time.   Psychiatric:         Mood and Affect: Mood normal.         Behavior: Behavior normal.            Result Review                       Allergies   Allergen Reactions   • Penicillins Rash      Past Medical History:   Diagnosis Date   • Allergic rhinitis    • Anemia    • Anesthesia complication     WITH CARPAL TUNNEL RELEASE, \"RELAXING MEDICINE\" HAD ISSUES WITH ITCHING\"   • Arthritis    • Cholelithiasis    • GERD (gastroesophageal reflux disease)    • History of anemia    • History of glaucoma    • History of osteoporosis    • History of varicose veins     WITH TORI LEG VEIN STRIPPING   • Hyperlipidemia    • Hypertension    • Hypothyroidism    • Left shoulder pain    • Rotator cuff disorder, left    • Sinus infection     WITH COUGH. RECOVERING FROM. WAS TREATED WITH ZPAC   • Trigeminal neuralgia    • Yeast infection     SCALP. BEEN TREATED. RESOLVED     Current Outpatient Medications   Medication Sig Dispense Refill   • atenolol (TENORMIN) 50 MG tablet Take 1 tablet by mouth Daily. 90 tablet 1   • b complex-C-folic acid 1 MG capsule Take 1 capsule by mouth Daily.     • carBAMazepine (CARBATROL) 100 MG 12 hr capsule Take 1 capsule by mouth Every Night. 90 capsule 1   • cholecalciferol (VITAMIN D3) 1000 UNITS tablet Take 1,000 Units by mouth Daily.     • Ketotifen Fumarate (ALAWAY OP) Apply 1 drop to eye(s) as directed by provider 2 (Two) Times a Day As Needed (FOR ALLERGIES).     • latanoprost (XALATAN) 0.005 % ophthalmic solution Administer 1 drop to both eyes Every Night.     • levothyroxine (Euthyrox) 88 " MCG tablet Take 1 tablet by mouth Daily. 90 tablet 1   • loratadine (CLARITIN) 10 MG tablet Take 10 mg by mouth Daily.     • losartan (COZAAR) 25 MG tablet Take 1 tablet by mouth Daily. 90 tablet 1   • LOTEMAX 0.5 % gel ophthalmic gel Administer 1 drop to both eyes 4 (Four) Times a Day As Needed (ALLERIGIES).     • montelukast (SINGULAIR) 10 MG tablet Take 1 tablet by mouth Every Night. 90 tablet 1   • omeprazole (priLOSEC) 20 MG capsule Take 1 capsule by mouth Daily. 90 capsule 1   • mupirocin (BACTROBAN) 2 % nasal ointment into the nostril(s) as directed by provider 2 (Two) Times a Day. (Patient taking differently: into the nostril(s) as directed by provider As Needed.) 1 g 1   • predniSONE (DELTASONE) 20 MG tablet Take 2 tablets daily x3 days; then, take 1 tablet daily x3 days; then take 1/2 tablet daily 11 tablet 0     No current facility-administered medications for this visit.     Past Surgical History:   Procedure Laterality Date   • CARPAL TUNNEL RELEASE Right 2009   • CATARACT EXTRACTION      LEFT AND RIGHT   • CHOLECYSTECTOMY     • CHOLECYSTECTOMY WITH INTRAOPERATIVE CHOLANGIOGRAM N/A 12/16/2016    Procedure: CHOLECYSTECTOMY LAPAROSCOPIC INTRAOPERATIVE CHOLANGIOGRAM;  Surgeon: Chaitanya Elizabeth MD;  Location: [facility];  Service:    • COLONOSCOPY N/A 2014    done at [facility]    • ENDOSCOPY N/A 04/17/2017    Procedure: ESOPHAGOGASTRODUODENOSCOPY WITH COLD BIOPSIES;  Surgeon: Shaheen Andre MD;  Location: [facility] ENDOSCOPY;  Service: gastritis   • ERCP  12/17/2016    Jes CHISHOLM   Sphincterotopy performed, biliary tree swept, choledocholithiasis   • DE ERCP DX COLLECTION SPECIMEN BRUSHING/WASHING N/A 12/17/2016    Procedure: ENDOSCOPIC RETROGRADE CHOLANGIOPANCREATOGRAPHY WITH SPHINCTEROTOMY AND BALLOON SWEEP AND STONE EXTRACTION;  Surgeon: Shaheen Andre MD;  Location: [facility] ENDOSCOPY;  Service: Gastroenterology   • SHOULDER ARTHROSCOPY W/ ROTATOR CUFF REPAIR Left 04/30/2019     Procedure: LEFT SHOULDER ARTHROSCOPY, BICEPS TENOTOMY, DEBRIDEMENT OF ROTATOR CUFF AND LABRUM;  Surgeon: Yoseph Galvan MD;  Location: [facility];  Service: Orthopedics   • TONSILLECTOMY     • TOTAL SHOULDER ARTHROPLASTY W/ DISTAL CLAVICLE EXCISION Left 05/28/2019    Procedure: LEFT TOTAL SHOULDER REVERSE ARTHROPLASTY;  Surgeon: Yoseph Galvan MD;  Location: [facility];  Service: Orthopedics   • VARICOSE VEIN SURGERY      LEFT AND RIGHT      Health Maintenance Due   Topic Date Due   • TDAP/TD VACCINES (1 - Tdap) Never done   • Pneumococcal Vaccine 65+ (2 - PCV) 08/10/2022   • COLORECTAL CANCER SCREENING  11/26/2022      Immunization History   Administered Date(s) Administered   • COVID-19 (MODERNA) 1st, 2nd, 3rd Dose Only 02/23/2021, 03/26/2021, 12/28/2021   • Pneumococcal Polysaccharide (PPSV23) 08/10/2021   • Shingrix 09/20/2021, 08/24/2022   • Zostavax 10/18/2012

## 2023-04-08 NOTE — PROGRESS NOTES
I saw the patient's  at a social gathering last night.  He indicated that Tish was having some difficulty with a rash on her face.  I ended up going with him to their home and saw her there.  She has some redness of the nose and a few crusting lesions on the left chin and a single lesion on the right ear.  These findings may be consistent with a staph infection.  For this reason, I have sent doxycycline this morning to [facility].  I will reach out to Tihs and make her aware.  If she does not see improvement over the next few days, I would recommend follow-up.  Thanks.   wheelchair

## 2023-08-05 DIAGNOSIS — I10 ESSENTIAL HYPERTENSION: ICD-10-CM

## 2023-08-07 RX ORDER — LOSARTAN POTASSIUM 25 MG/1
25 TABLET ORAL DAILY
Qty: 90 TABLET | Refills: 1 | Status: SHIPPED | OUTPATIENT
Start: 2023-08-07

## 2023-08-07 NOTE — TELEPHONE ENCOUNTER
I have sent a couple of refills on the medication.  Please schedule follow-up visit in mid November.  Thanks.

## 2023-10-03 DIAGNOSIS — I10 ESSENTIAL HYPERTENSION: ICD-10-CM

## 2023-10-03 DIAGNOSIS — E03.9 ACQUIRED HYPOTHYROIDISM: ICD-10-CM

## 2023-10-03 DIAGNOSIS — J30.9 ALLERGIC RHINITIS, UNSPECIFIED SEASONALITY, UNSPECIFIED TRIGGER: ICD-10-CM

## 2023-10-03 RX ORDER — MONTELUKAST SODIUM 10 MG/1
10 TABLET ORAL DAILY
Qty: 90 TABLET | Refills: 0 | Status: SHIPPED | OUTPATIENT
Start: 2023-10-03

## 2023-10-06 RX ORDER — LEVOTHYROXINE SODIUM 88 UG/1
88 TABLET ORAL DAILY
Qty: 90 TABLET | Refills: 0 | Status: SHIPPED | OUTPATIENT
Start: 2023-10-06

## 2023-10-06 RX ORDER — ATENOLOL 50 MG/1
50 TABLET ORAL DAILY
Qty: 90 TABLET | Refills: 0 | Status: SHIPPED | OUTPATIENT
Start: 2023-10-06

## 2023-10-06 NOTE — TELEPHONE ENCOUNTER
Caller: Tish Urias    Relationship: Self    Best call back number: [phone]     Requested Prescriptions:   Requested Prescriptions     Pending Prescriptions Disp Refills    atenolol (TENORMIN) 50 MG tablet [Pharmacy Med Name: ATENOLOL 50MG TABLET] 90 tablet 3     Sig: TAKE ONE (1) TABLET BY MOUTH DAILY.    levothyroxine (SYNTHROID, LEVOTHROID) 88 MCG tablet [Pharmacy Med Name: LEVOTHYROXINE SODIUM 88MCG TABLET] 90 tablet 3     Sig: TAKE ONE (1) TABLET BY MOUTH DAILY.        Pharmacy where request should be sent[facility] [place] [facility] [address] - [phone]  - [phone] FX     Last office visit with prescribing clinician: 11/14/2022   Last telemedicine visit with prescribing clinician: Visit date not found   Next office visit with prescribing clinician: 11/9/2023     Additional details provided by patient: FOUR DAYS LEFT ON HAND     Does the patient have less than a 3 day supply:  [] Yes  [x] No    Would you like a call back once the refill request has been completed: [x] Yes [] No    If the office needs to give you a call back, can they leave a voicemail: [x] Yes [] No    Sadi Vargas Rep   10/06/23 12:59 EDT

## 2023-11-09 ENCOUNTER — OFFICE VISIT (OUTPATIENT)
Dept: FAMILY MEDICINE CLINIC | Age: 78
End: 2023-11-09
Payer: MEDICARE

## 2023-11-09 ENCOUNTER — PATIENT MESSAGE (OUTPATIENT)
Dept: FAMILY MEDICINE CLINIC | Age: 78
End: 2023-11-09

## 2023-11-09 VITALS
BODY MASS INDEX: 30.09 KG/M2 | HEART RATE: 66 BPM | HEIGHT: 63 IN | WEIGHT: 169.8 LBS | DIASTOLIC BLOOD PRESSURE: 89 MMHG | SYSTOLIC BLOOD PRESSURE: 146 MMHG | TEMPERATURE: 98.8 F

## 2023-11-09 DIAGNOSIS — E03.9 ACQUIRED HYPOTHYROIDISM: ICD-10-CM

## 2023-11-09 DIAGNOSIS — K21.9 GASTROESOPHAGEAL REFLUX DISEASE, UNSPECIFIED WHETHER ESOPHAGITIS PRESENT: ICD-10-CM

## 2023-11-09 DIAGNOSIS — Z79.899 OTHER LONG TERM (CURRENT) DRUG THERAPY: ICD-10-CM

## 2023-11-09 DIAGNOSIS — G50.0 TRIGEMINAL NEURALGIA: ICD-10-CM

## 2023-11-09 DIAGNOSIS — Z12.11 SCREEN FOR COLON CANCER: ICD-10-CM

## 2023-11-09 DIAGNOSIS — Z00.00 PHYSICAL EXAM: Primary | ICD-10-CM

## 2023-11-09 DIAGNOSIS — E78.2 MIXED HYPERLIPIDEMIA: ICD-10-CM

## 2023-11-09 DIAGNOSIS — I10 ESSENTIAL HYPERTENSION: ICD-10-CM

## 2023-11-09 DIAGNOSIS — J30.9 ALLERGIC RHINITIS, UNSPECIFIED SEASONALITY, UNSPECIFIED TRIGGER: ICD-10-CM

## 2023-11-09 RX ORDER — CARBAMAZEPINE 100 MG/1
100 CAPSULE, EXTENDED RELEASE ORAL NIGHTLY
Qty: 90 CAPSULE | Refills: 3 | Status: SHIPPED | OUTPATIENT
Start: 2023-11-09

## 2023-11-09 RX ORDER — ATENOLOL 50 MG/1
50 TABLET ORAL DAILY
Qty: 90 TABLET | Refills: 3 | Status: SHIPPED | OUTPATIENT
Start: 2023-11-09

## 2023-11-09 RX ORDER — LEVOTHYROXINE SODIUM 88 UG/1
88 TABLET ORAL DAILY
Qty: 90 TABLET | Refills: 3 | Status: SHIPPED | OUTPATIENT
Start: 2023-11-09

## 2023-11-09 RX ORDER — OMEPRAZOLE 20 MG/1
20 CAPSULE, DELAYED RELEASE ORAL DAILY
Qty: 90 CAPSULE | Refills: 3 | Status: SHIPPED | OUTPATIENT
Start: 2023-11-09

## 2023-11-09 RX ORDER — LOSARTAN POTASSIUM 50 MG/1
50 TABLET ORAL DAILY
Qty: 90 TABLET | Refills: 3 | Status: SHIPPED | OUTPATIENT
Start: 2023-11-09

## 2023-11-09 RX ORDER — MONTELUKAST SODIUM 10 MG/1
10 TABLET ORAL DAILY
Qty: 90 TABLET | Refills: 3 | Status: SHIPPED | OUTPATIENT
Start: 2023-11-09

## 2023-11-09 NOTE — PROGRESS NOTES
The ABCs of the Annual Wellness Visit  Subsequent Medicare Wellness Visit    Subjective    Tish Urias is a 77 y.o. female who presents for a Subsequent Medicare Wellness Visit.    The following portions of the patient's history were reviewed and updated as appropriate: allergies, current medications, past family history, past medical history, past social history, past surgical history, and problem list.    Compared to one year ago, the patient feels her physical health is the same.    Compared to one year ago, the patient feels her mental health is the same.    Recent Hospitalizations:  She was not admitted to the hospital during the last year.     Current Medical Providers:  Patient Care Team:  Samir Coto MD as PCP - General (Family Medicine)    Outpatient Medications Prior to Visit   Medication Sig Dispense Refill    b complex-C-folic acid 1 MG capsule Take 1 capsule by mouth Daily.      cholecalciferol (VITAMIN D3) 1000 UNITS tablet Take 1 tablet by mouth Daily.      Ketotifen Fumarate (ALAWAY OP) Apply 1 drop to eye(s) as directed by provider 2 (Two) Times a Day As Needed (FOR ALLERGIES).      latanoprost (XALATAN) 0.005 % ophthalmic solution Administer 1 drop to both eyes Every Night.      loratadine (CLARITIN) 10 MG tablet Take 1 tablet by mouth Daily.      LOTEMAX 0.5 % gel ophthalmic gel Administer 1 drop to both eyes 4 (Four) Times a Day As Needed (ALLERIGIES).      mupirocin (BACTROBAN) 2 % nasal ointment into the nostril(s) as directed by provider 2 (Two) Times a Day. (Patient taking differently: into the nostril(s) as directed by provider As Needed.) 1 g 1    atenolol (TENORMIN) 50 MG tablet Take 1 tablet by mouth Daily. 90 tablet 0    carBAMazepine (CARBATROL) 100 MG 12 hr capsule Take 1 capsule by mouth Every Night. 90 capsule 1    levothyroxine (SYNTHROID, LEVOTHROID) 88 MCG tablet Take 1 tablet by mouth Daily. 90 tablet 0    losartan (COZAAR) 25 MG tablet Take 1 tablet by mouth Daily.  "90 tablet 1    montelukast (SINGULAIR) 10 MG tablet Take 1 tablet by mouth Daily. 90 tablet 0    omeprazole (priLOSEC) 20 MG capsule Take 1 capsule by mouth Daily. 90 capsule 1     No facility-administered medications prior to visit.       No opioid medication identified on active medication list. I have reviewed chart for other potential  high risk medication/s and harmful drug interactions in the elderly.      Aspirin is not on active medication list.  Aspirin use is not indicated based on review of current medical condition/s. Risk of harm outweighs potential benefits.  .    Patient Active Problem List   Diagnosis    Gallstones    Calculus of bile duct without cholecystitis and without obstruction    Epigastric pain    Irritable bowel syndrome without diarrhea    S/P reverse total shoulder arthroplasty, left    Hypothyroidism    Essential hypertension    Trigeminal neuralgia    Rash and nonspecific skin eruption    Mixed hyperlipidemia    Other long term (current) drug therapy     Advance Care Planning  Advance Directive is on file.  ACP discussion was held with the patient during this visit. Patient has an advance directive in EMR which is still valid.  Her , Dinesh, would make decisions if needed.     Objective    Vitals:    11/09/23 0828   BP: 141/85   BP Location: Right arm   Patient Position: Sitting   Pulse: 70   Temp: 98.8 °F (37.1 °C)   TempSrc: Oral   Weight: 77 kg (169 lb 12.8 oz)   Height: 158.8 cm (62.52\")   PainSc: 0-No pain     Estimated body mass index is 30.54 kg/m² as calculated from the following:    Height as of this encounter: 158.8 cm (62.52\").    Weight as of this encounter: 77 kg (169 lb 12.8 oz).    BMI is >= 30 and <35. (Class 1 Obesity). The following options were offered after discussion;: exercise counseling/recommendations and nutrition counseling/recommendations    Does the patient have evidence of cognitive impairment? No        HEALTH RISK ASSESSMENT    Smoking Status:  Social " History     Tobacco Use   Smoking Status Never   Smokeless Tobacco Never     Alcohol Consumption:  Social History     Substance and Sexual Activity   Alcohol Use No     Fall Risk Screen:    BORISADI Fall Risk Assessment was completed, and patient is at LOW risk for falls.Assessment completed on:2023    Depression Screenin/9/2023     8:26 AM   PHQ-2/PHQ-9 Depression Screening   Little Interest or Pleasure in Doing Things 0-->not at all   Feeling Down, Depressed or Hopeless 0-->not at all   PHQ-9: Brief Depression Severity Measure Score 0       Health Habits and Functional and Cognitive Screenin/9/2023     8:26 AM   Functional & Cognitive Status   Do you have difficulty preparing food and eating? No   Do you have difficulty bathing yourself, getting dressed or grooming yourself? No   Do you have difficulty using the toilet? No   Do you have difficulty moving around from place to place? No   Do you have trouble with steps or getting out of a bed or a chair? No   Current Diet Well Balanced Diet   Dental Exam Up to date   Eye Exam Up to date   Exercise (times per week) 3 times per week   Current Exercises Include Other         Exercise Comment strengthening bands   Do you need help using the phone?  No   Are you deaf or do you have serious difficulty hearing?  No   Do you need help to go to places out of walking distance? No   Do you need help shopping? No   Do you need help preparing meals?  No   Do you need help with housework?  No   Do you need help with laundry? No   Do you need help taking your medications? No   Do you need help managing money? No   Do you ever drive or ride in a car without wearing a seat belt? No   Have you felt unusual stress, anger or loneliness in the last month? No   Who do you live with? Spouse   If you need help, do you have trouble finding someone available to you? No   Have you been bothered in the last four weeks by sexual problems? No   Do you have difficulty  concentrating, remembering or making decisions? No       Significant value       Age-appropriate Screening Schedule:  Refer to the list below for future screening recommendations based on patient's age, sex and/or medical conditions. Orders for these recommended tests are listed in the plan section. The patient has been provided with a written plan.    Health Maintenance   Topic Date Due    BMI FOLLOWUP  08/10/2022    COLORECTAL CANCER SCREENING  11/26/2022    INFLUENZA VACCINE  03/31/2024 (Originally 8/1/2023)    COVID-19 Vaccine (4 - 2023-24 season) 11/08/2024 (Originally 9/1/2023)    Pneumococcal Vaccine 65+ (2 - PCV) 11/09/2024 (Originally 8/10/2022)    LIPID PANEL  07/06/2024    ANNUAL WELLNESS VISIT  11/09/2024    DXA SCAN  01/18/2025    TDAP/TD VACCINES (2 - Td or Tdap) 02/21/2029    HEPATITIS C SCREENING  Completed    ZOSTER VACCINE  Completed          CMS Preventative Services Quick Reference  Risk Factors Identified During Encounter  Chronic Pain: OTC analgesics as needed. Proper dosing schedule discussed.   Immunizations Discussed/Encouraged: Tdap, Influenza, Prevnar 20 (Pneumococcal 20-valent conjugate), COVID19, and RSV (Respiratory Syncytial Virus)  The above risks/problems have been discussed with the patient.  Pertinent information has been shared with the patient in the After Visit Summary.  An After Visit Summary and PPPS were made available to the patient.    Follow Up:   Next Medicare Wellness visit to be scheduled in 1 year.     Additional E&M Note during same encounter follows:  Patient has multiple medical problems which are significant and separately identifiable that require additional work above and beyond the Medicare Wellness Visit.      Chief Complaint:  Blood pressure, thyroid, trigeminal neuralgia    Subjective    History of Present Illness:  In addition to the Medicare wellness exam, Tish is also here for follow-up on her usual care.  She has hypertension and remains on treatment for  "this as noted.  Her blood pressure is borderline on initial check.  She does not have any obvious side effects from the blood pressure other medications.    She also has hypothyroidism.  Her TSH was normal when it was last checked about 4 months ago.    She also has trigeminal neuralgia for which she takes generic Tegretol.  She has been on this for many years.  She did try to stop it, but she had recurrent symptoms and has remained on it.    Tish also has elevated cholesterol.  We have discussed statin therapy in the past, but she declines cholesterol-lowering medication.    Review of Systems:  Review of Systems   Constitutional:  Negative for chills and fever.   Respiratory:  Negative for cough and shortness of breath.    Cardiovascular:  Negative for chest pain and palpitations.   Gastrointestinal:  Negative for abdominal pain, nausea and vomiting.      Objective   Vital Signs:  Vitals:    11/09/23 0828 11/09/23 0900   BP: 141/85 146/89   BP Location: Right arm Right arm   Patient Position: Sitting Sitting   Pulse: 70 66   Temp: 98.8 °F (37.1 °C)    TempSrc: Oral    Weight: 77 kg (169 lb 12.8 oz)    Height: 158.8 cm (62.52\")    PainSc: 0-No pain    Body mass index is 30.54 kg/m².    Physical Exam  Vitals and nursing note reviewed.   Constitutional:       General: She is not in acute distress.     Appearance: She is not ill-appearing.   HENT:      Right Ear: Tympanic membrane and ear canal normal.      Left Ear: Tympanic membrane and ear canal normal.      Mouth/Throat:      Mouth: Mucous membranes are moist.      Comments: Pharynx appears normal  Eyes:      Extraocular Movements: Extraocular movements intact.      Pupils: Pupils are equal, round, and reactive to light.   Neck:      Thyroid: No thyromegaly.   Cardiovascular:      Rate and Rhythm: Normal rate and regular rhythm.      Heart sounds: No murmur heard.  Pulmonary:      Effort: Pulmonary effort is normal.      Breath sounds: Normal breath sounds. "   Abdominal:      General: There is no distension.      Palpations: Abdomen is soft. There is no mass.      Tenderness: There is no abdominal tenderness.   Musculoskeletal:      Cervical back: Normal range of motion.   Skin:     Findings: No lesion or rash.   Neurological:      General: No focal deficit present.      Mental Status: She is oriented to person, place, and time.      Cranial Nerves: No cranial nerve deficit.   Psychiatric:         Mood and Affect: Mood normal.       The following data was reviewed by Samir Coto MD on 11/09/2023.  Lab Results   Component Value Date    WBC 6.86 07/06/2023    HGB 12.6 07/06/2023    HCT 38.0 07/06/2023    MCV 89.2 07/06/2023     07/06/2023     Lab Results   Component Value Date    GLUCOSE 97 07/06/2023    BUN 27 (H) 07/06/2023    CREATININE 1.06 (H) 07/06/2023     07/06/2023    K 5.0 07/06/2023     07/06/2023    CO2 24.7 07/06/2023    CALCIUM 9.3 07/06/2023    PROTEINTOT 6.8 07/06/2023    ALBUMIN 4.2 07/06/2023    ALT 19 07/06/2023    AST 24 07/06/2023    ALKPHOS 103 07/06/2023    BILITOT 0.3 07/06/2023    EGFR 54.9 (L) 07/06/2023    GLOB 2.6 07/06/2023    AGRATIO 1.6 07/06/2023    BCR 25.5 (H) 07/06/2023    ANIONGAP 10.3 07/06/2023      Lab Results   Component Value Date    CHOL 246 (H) 07/06/2023    CHLPL 267 (H) 02/23/2021    TRIG 209 (H) 07/06/2023    HDL 44 07/06/2023     (H) 07/06/2023     Lab Results   Component Value Date    TSH 3.290 07/06/2023             Assessment and Plan:   Today, we have reviewed her care.  Tish is doing well overall.  Regarding the Medicare wellness exam, she is basically up-to-date on usual cancer screenings.  She is due for colon cancer screening though, and we will move ahead with Radha.  We also discussed vaccines, but she declines these today.    Regarding her usual care, her blood pressure is mildly elevated, and we will recheck it before she leaves.  We will press losartan to 50 mg daily.   Otherwise, no short-term change is anticipated.  We will contact her in January over [employer] for labs.  Tentative follow-up will be in about a year for repeat exam.    ADDENDUM: Her blood pressure persisted and being elevated on recheck.  We will reach out to her in a few weeks for a blood pressure check given the increase in losartan.    Diagnoses and all orders for this visit:    1. Physical exam (Primary)    2. Essential hypertension  -     atenolol (TENORMIN) 50 MG tablet; Take 1 tablet by mouth Daily.  Dispense: 90 tablet; Refill: 3  -     losartan (COZAAR) 50 MG tablet; Take 1 tablet by mouth Daily.  Dispense: 90 tablet; Refill: 3  -     Comprehensive Metabolic Panel; Future    3. Mixed hyperlipidemia  -     Comprehensive Metabolic Panel; Future    4. Acquired hypothyroidism  -     levothyroxine (SYNTHROID, LEVOTHROID) 88 MCG tablet; Take 1 tablet by mouth Daily.  Dispense: 90 tablet; Refill: 3  -     TSH; Future    5. Trigeminal neuralgia  -     carBAMazepine (CARBATROL) 100 MG 12 hr capsule; Take 1 capsule by mouth Every Night.  Dispense: 90 capsule; Refill: 3  -     CBC (No Diff); Future  -     Carbamazepine level, total; Future    6. Allergic rhinitis, unspecified seasonality, unspecified trigger  -     montelukast (SINGULAIR) 10 MG tablet; Take 1 tablet by mouth Daily.  Dispense: 90 tablet; Refill: 3    7. Gastroesophageal reflux disease, unspecified whether esophagitis present  -     omeprazole (priLOSEC) 20 MG capsule; Take 1 capsule by mouth Daily.  Dispense: 90 capsule; Refill: 3    8. Screen for colon cancer  -     Cologuard - Stool, Per Rectum; Future    9. Other long term (current) drug therapy  -     CBC (No Diff); Future  -     Carbamazepine level, total; Future       Follow Up  Return in about 1 year (around 11/9/2024) for Recheck, Medicare Wellness.  Patient was given instructions and counseling regarding her condition or for health maintenance advice. Please see specific information pulled  into the AVS if appropriate.

## 2023-11-27 ENCOUNTER — OFFICE VISIT (OUTPATIENT)
Dept: FAMILY MEDICINE CLINIC | Age: 78
End: 2023-11-27
Payer: MEDICARE

## 2023-11-27 ENCOUNTER — HOSPITAL ENCOUNTER (OUTPATIENT)
Dept: GENERAL RADIOLOGY | Facility: HOSPITAL | Age: 78
Discharge: HOME | End: 2023-11-27
Admitting: FAMILY MEDICINE
Payer: MEDICARE

## 2023-11-27 VITALS
SYSTOLIC BLOOD PRESSURE: 125 MMHG | HEIGHT: 63 IN | TEMPERATURE: 98.4 F | HEART RATE: 76 BPM | WEIGHT: 172 LBS | DIASTOLIC BLOOD PRESSURE: 79 MMHG | BODY MASS INDEX: 30.48 KG/M2

## 2023-11-27 DIAGNOSIS — M79.641 RIGHT HAND PAIN: Primary | ICD-10-CM

## 2023-11-27 DIAGNOSIS — R05.3 CHRONIC COUGH: ICD-10-CM

## 2023-11-27 DIAGNOSIS — M79.641 RIGHT HAND PAIN: ICD-10-CM

## 2023-11-27 PROCEDURE — 1159F MED LIST DOCD IN RCRD: CPT | Performed by: FAMILY MEDICINE

## 2023-11-27 PROCEDURE — 3078F DIAST BP <80 MM HG: CPT | Performed by: FAMILY MEDICINE

## 2023-11-27 PROCEDURE — 71046 X-RAY EXAM CHEST 2 VIEWS: CPT

## 2023-11-27 PROCEDURE — 1160F RVW MEDS BY RX/DR IN RCRD: CPT | Performed by: FAMILY MEDICINE

## 2023-11-27 PROCEDURE — 73130 X-RAY EXAM OF HAND: CPT

## 2023-11-27 PROCEDURE — 99213 OFFICE O/P EST LOW 20 MIN: CPT | Performed by: FAMILY MEDICINE

## 2023-11-27 PROCEDURE — 3074F SYST BP LT 130 MM HG: CPT | Performed by: FAMILY MEDICINE

## 2023-11-27 RX ORDER — PREDNISONE 20 MG/1
TABLET ORAL
Qty: 11 TABLET | Refills: 0 | Status: SHIPPED | OUTPATIENT
Start: 2023-11-27

## 2023-11-27 NOTE — PROGRESS NOTES
"Tish Urias presents to [facility].    Chief Complaint:  Right hand pain    Subjective   History of Present Illness:  Tish is being seen today for follow-up on right hand pain.  She has been dealing with this for the last week or longer.  She is not aware of any specific injury that may have caused this.  We briefly discussed that at her recent visit, but the hand is worse over the last week.  She says there is significant swelling of the hand at the base of the thumb.  She has significant pain with activities that involve the thumb and hand.  She is not having other joints that are causing her difficulty currently.  She has taken an occasional ibuprofen for this.    Review of Systems:  Review of Systems   Constitutional:  Negative for chills and fever.   Respiratory:  Positive for cough. Negative for shortness of breath.    Cardiovascular:  Negative for chest pain and palpitations.   Gastrointestinal:  Negative for abdominal pain, nausea and vomiting.   Skin:  Negative for rash.        Objective   Medical History:  Past Medical History:    Allergic rhinitis    Anemia    Anesthesia complication    WITH CARPAL TUNNEL RELEASE, \"RELAXING MEDICINE\" HAD ISSUES WITH ITCHING\"    Arthritis    Cholelithiasis    GERD (gastroesophageal reflux disease)    History of anemia    History of glaucoma    History of osteoporosis    History of varicose veins    WITH TORI LEG VEIN STRIPPING    Hyperlipidemia    Hypertension    Hypothyroidism    Left shoulder pain    Rotator cuff disorder, left    Sinus infection    WITH COUGH. RECOVERING FROM. WAS TREATED WITH ZPAC    Trigeminal neuralgia    Yeast infection    SCALP. BEEN TREATED. RESOLVED     Past Surgical History:    CARPAL TUNNEL RELEASE    CATARACT EXTRACTION    LEFT AND RIGHT    CHOLECYSTECTOMY    CHOLECYSTECTOMY WITH INTRAOPERATIVE CHOLANGIOGRAM    Procedure: CHOLECYSTECTOMY LAPAROSCOPIC INTRAOPERATIVE CHOLANGIOGRAM;  Surgeon: Chaitanya Elizabeth MD;  " [place] [facility] [place] [facility] [place]:     COLONOSCOPY    done at [facility]     ENDOSCOPY    Procedure: ESOPHAGOGASTRODUODENOSCOPY WITH COLD BIOPSIES;  Surgeon: Shaheen Andre MD;  Location: [facility] ENDOSCOPY;  Service: gastritis    ERCP    Jes CHISHOLM   Sphincterotopy performed, biliary tree swept, choledocholithiasis    PA ERCP DX COLLECTION SPECIMEN BRUSHING/WASHING    Procedure: ENDOSCOPIC RETROGRADE CHOLANGIOPANCREATOGRAPHY WITH SPHINCTEROTOMY AND BALLOON SWEEP AND STONE EXTRACTION;  Surgeon: Shaheen Andre MD;  Location: [facility] ENDOSCOPY;  Service: Gastroenterology    SHOULDER ARTHROSCOPY W/ ROTATOR CUFF REPAIR    Procedure: LEFT SHOULDER ARTHROSCOPY, BICEPS TENOTOMY, DEBRIDEMENT OF ROTATOR CUFF AND LABRUM;  Surgeon: Yoseph Galvan MD;  [place] [facility] [place] [facility] [place]  Service: Orthopedics    TONSILLECTOMY    TOTAL SHOULDER ARTHROPLASTY W/ DISTAL CLAVICLE EXCISION    Procedure: LEFT TOTAL SHOULDER REVERSE ARTHROPLASTY;  Surgeon: Yoseph Galvan MD;  Location: [facility];  Service: Orthopedics    VARICOSE VEIN SURGERY    LEFT AND RIGHT      Family History   Problem Relation Age of Onset    Malig Hyperthermia Neg Hx      Social History     Tobacco Use    Smoking status: Never    Smokeless tobacco: Never   Substance Use Topics    Alcohol use: No       Health Maintenance Due   Topic Date Due    COLORECTAL CANCER SCREENING  11/26/2022        Immunization History   Administered Date(s) Administered    COVID-19 (MODERNA) 1st,2nd,3rd Dose Monovalent 02/23/2021, 03/26/2021, 12/28/2021    Pneumococcal Polysaccharide (PPSV23) 08/10/2021    Shingrix 09/20/2021, 08/24/2022    Tdap 02/21/2019    Zostavax 10/18/2012       Allergies   Allergen Reactions    Penicillins Rash        Medications:  Current Outpatient Medications on File Prior to Visit   Medication Sig    atenolol (TENORMIN) 50 MG tablet Take 1 tablet by mouth Daily.    b complex-C-folic acid 1 MG capsule Take 1 capsule by mouth Daily.     "carBAMazepine (CARBATROL) 100 MG 12 hr capsule Take 1 capsule by mouth Every Night.    cholecalciferol (VITAMIN D3) 1000 UNITS tablet Take 1 tablet by mouth Daily.    Ketotifen Fumarate (ALAWAY OP) Apply 1 drop to eye(s) as directed by provider 2 (Two) Times a Day As Needed (FOR ALLERGIES).    latanoprost (XALATAN) 0.005 % ophthalmic solution Administer 1 drop to both eyes Every Night.    levothyroxine (SYNTHROID, LEVOTHROID) 88 MCG tablet Take 1 tablet by mouth Daily.    loratadine (CLARITIN) 10 MG tablet Take 1 tablet by mouth Daily.    losartan (COZAAR) 50 MG tablet Take 1 tablet by mouth Daily.    LOTEMAX 0.5 % gel ophthalmic gel Administer 1 drop to both eyes 4 (Four) Times a Day As Needed (ALLERIGIES).    montelukast (SINGULAIR) 10 MG tablet Take 1 tablet by mouth Daily.    mupirocin (BACTROBAN) 2 % nasal ointment into the nostril(s) as directed by provider 2 (Two) Times a Day. (Patient taking differently: into the nostril(s) as directed by provider As Needed.)    omeprazole (priLOSEC) 20 MG capsule Take 1 capsule by mouth Daily.     No current facility-administered medications on file prior to visit.       Vital Signs:   Vitals:    11/27/23 0938 11/27/23 0958   BP: 160/92 125/79   BP Location: Left arm Left arm   Patient Position: Sitting Sitting   Pulse: 80 76   Temp: 98.4 °F (36.9 °C)    TempSrc: Oral    Weight: 78 kg (172 lb)    Height: 158.8 cm (62.52\")    Body mass index is 30.94 kg/m².    Physical Exam:  Physical Exam  Vitals reviewed.   Constitutional:       General: She is not in acute distress.     Appearance: She is not ill-appearing.   Eyes:      Pupils: Pupils are equal, round, and reactive to light.   Neck:      Comments: No thyromegaly  Cardiovascular:      Rate and Rhythm: Normal rate and regular rhythm.   Pulmonary:      Effort: Pulmonary effort is normal.      Breath sounds: Normal breath sounds.   Abdominal:      General: There is no distension.      Palpations: Abdomen is soft.      " "Tenderness: There is no abdominal tenderness.   Musculoskeletal:         General: Swelling (There is some edema at the base of the right thumb.  No significant point tenderness is present.) present.      Cervical back: Neck supple.   Lymphadenopathy:      Cervical: No cervical adenopathy.   Skin:     Findings: No lesion or rash.   Neurological:      Mental Status: She is alert.         Result Review   The following data was reviewed by Samir Coto MD on 11/27/2023.  Lab Results   Component Value Date    WBC 6.86 07/06/2023    HGB 12.6 07/06/2023    HCT 38.0 07/06/2023    MCV 89.2 07/06/2023     07/06/2023     Lab Results   Component Value Date    GLUCOSE 97 07/06/2023    BUN 27 (H) 07/06/2023    CREATININE 1.06 (H) 07/06/2023     07/06/2023    K 5.0 07/06/2023     07/06/2023    CO2 24.7 07/06/2023    CALCIUM 9.3 07/06/2023    PROTEINTOT 6.8 07/06/2023    ALBUMIN 4.2 07/06/2023    ALT 19 07/06/2023    AST 24 07/06/2023    ALKPHOS 103 07/06/2023    BILITOT 0.3 07/06/2023    EGFR 54.9 (L) 07/06/2023    GLOB 2.6 07/06/2023    AGRATIO 1.6 07/06/2023    BCR 25.5 (H) 07/06/2023    ANIONGAP 10.3 07/06/2023      Lab Results   Component Value Date    CHOL 246 (H) 07/06/2023    CHLPL 267 (H) 02/23/2021    TRIG 209 (H) 07/06/2023    HDL 44 07/06/2023     (H) 07/06/2023     Lab Results   Component Value Date    TSH 3.290 07/06/2023     No results found for: \"HGBA1C\"         Assessment and Plan:   Today, we have reviewed her care.  Her symptoms likely represent significant osteoarthritis involving the carpometacarpal joint of that right thumb.  We will move ahead with x-ray evaluation as noted and then give thought to how to move forward with it.  I may want to give her a brief trial of an NSAID, but we will do that with caution.  Also, her cough is ongoing.  Chest x-ray will be obtained as a precaution.    Diagnoses and all orders for this visit:    1. Right hand pain (Primary)  -     XR Hand " 3+ View Right; Future    2. Chronic cough  -     XR Chest 2 View; Future    Follow Up  Return if symptoms worsen or fail to improve.  Patient was given instructions and counseling regarding her condition or for health maintenance advice. Please see specific information pulled into the AVS if appropriate.

## 2023-12-30 DIAGNOSIS — I10 ESSENTIAL HYPERTENSION: ICD-10-CM

## 2024-01-02 RX ORDER — LOSARTAN POTASSIUM 25 MG/1
25 TABLET ORAL DAILY
Qty: 90 TABLET | Refills: 3 | OUTPATIENT
Start: 2024-01-02

## 2024-01-19 ENCOUNTER — TELEPHONE (OUTPATIENT)
Dept: FAMILY MEDICINE CLINIC | Age: 79
End: 2024-01-19
Payer: MEDICARE

## 2024-02-12 ENCOUNTER — TELEPHONE (OUTPATIENT)
Dept: FAMILY MEDICINE CLINIC | Age: 79
End: 2024-02-12
Payer: MEDICARE

## 2024-02-21 ENCOUNTER — LAB (OUTPATIENT)
Dept: LAB | Facility: HOSPITAL | Age: 79
End: 2024-02-21
Payer: MEDICARE

## 2024-02-21 DIAGNOSIS — I10 ESSENTIAL HYPERTENSION: ICD-10-CM

## 2024-02-21 DIAGNOSIS — G50.0 TRIGEMINAL NEURALGIA: ICD-10-CM

## 2024-02-21 DIAGNOSIS — E78.2 MIXED HYPERLIPIDEMIA: ICD-10-CM

## 2024-02-21 DIAGNOSIS — Z79.899 OTHER LONG TERM (CURRENT) DRUG THERAPY: ICD-10-CM

## 2024-02-21 DIAGNOSIS — E03.9 ACQUIRED HYPOTHYROIDISM: ICD-10-CM

## 2024-02-21 LAB
ALBUMIN SERPL-MCNC: 4.3 G/DL (ref 3.5–5.2)
ALBUMIN/GLOB SERPL: 2.3 G/DL
ALP SERPL-CCNC: 103 U/L (ref 39–117)
ALT SERPL W P-5'-P-CCNC: 13 U/L (ref 1–33)
ANION GAP SERPL CALCULATED.3IONS-SCNC: 8 MMOL/L (ref 5–15)
AST SERPL-CCNC: 21 U/L (ref 1–32)
BILIRUB SERPL-MCNC: 0.2 MG/DL (ref 0–1.2)
BUN SERPL-MCNC: 22 MG/DL (ref 8–23)
BUN/CREAT SERPL: 21.6 (ref 7–25)
CALCIUM SPEC-SCNC: 9.2 MG/DL (ref 8.6–10.5)
CARBAMAZEPINE SERPL-MCNC: 2.9 MCG/ML (ref 4–12)
CHLORIDE SERPL-SCNC: 105 MMOL/L (ref 98–107)
CO2 SERPL-SCNC: 27 MMOL/L (ref 22–29)
CREAT SERPL-MCNC: 1.02 MG/DL (ref 0.57–1)
DEPRECATED RDW RBC AUTO: 44.4 FL (ref 37–54)
EGFRCR SERPLBLD CKD-EPI 2021: 57.1 ML/MIN/1.73
ERYTHROCYTE [DISTWIDTH] IN BLOOD BY AUTOMATED COUNT: 13.2 % (ref 12.3–15.4)
GLOBULIN UR ELPH-MCNC: 1.9 GM/DL
GLUCOSE SERPL-MCNC: 100 MG/DL (ref 65–99)
HCT VFR BLD AUTO: 37.5 % (ref 34–46.6)
HGB BLD-MCNC: 12.3 G/DL (ref 12–15.9)
MCH RBC QN AUTO: 29.6 PG (ref 26.6–33)
MCHC RBC AUTO-ENTMCNC: 32.8 G/DL (ref 31.5–35.7)
MCV RBC AUTO: 90.1 FL (ref 79–97)
PLATELET # BLD AUTO: 214 10*3/MM3 (ref 140–450)
PMV BLD AUTO: 8.6 FL (ref 6–12)
POTASSIUM SERPL-SCNC: 4.6 MMOL/L (ref 3.5–5.2)
PROT SERPL-MCNC: 6.2 G/DL (ref 6–8.5)
RBC # BLD AUTO: 4.16 10*6/MM3 (ref 3.77–5.28)
SODIUM SERPL-SCNC: 140 MMOL/L (ref 136–145)
TSH SERPL DL<=0.05 MIU/L-ACNC: 2.84 UIU/ML (ref 0.27–4.2)
WBC NRBC COR # BLD AUTO: 6.25 10*3/MM3 (ref 3.4–10.8)

## 2024-02-21 PROCEDURE — 84443 ASSAY THYROID STIM HORMONE: CPT

## 2024-02-21 PROCEDURE — 85027 COMPLETE CBC AUTOMATED: CPT

## 2024-02-21 PROCEDURE — 80156 ASSAY CARBAMAZEPINE TOTAL: CPT

## 2024-02-21 PROCEDURE — 36415 COLL VENOUS BLD VENIPUNCTURE: CPT

## 2024-02-21 PROCEDURE — 80053 COMPREHEN METABOLIC PANEL: CPT

## 2024-05-31 ENCOUNTER — OFFICE VISIT (OUTPATIENT)
Dept: FAMILY MEDICINE CLINIC | Age: 79
End: 2024-05-31
Payer: MEDICARE

## 2024-05-31 VITALS
OXYGEN SATURATION: 96 % | TEMPERATURE: 98.2 F | HEART RATE: 89 BPM | HEIGHT: 63 IN | DIASTOLIC BLOOD PRESSURE: 81 MMHG | BODY MASS INDEX: 29.23 KG/M2 | WEIGHT: 165 LBS | SYSTOLIC BLOOD PRESSURE: 121 MMHG

## 2024-05-31 DIAGNOSIS — R52 BODY ACHES: ICD-10-CM

## 2024-05-31 DIAGNOSIS — U07.1 COVID: Primary | ICD-10-CM

## 2024-05-31 DIAGNOSIS — R05.1 ACUTE COUGH: ICD-10-CM

## 2024-05-31 DIAGNOSIS — R09.81 NASAL CONGESTION: ICD-10-CM

## 2024-05-31 LAB
EXPIRATION DATE: ABNORMAL
FLUAV AG UPPER RESP QL IA.RAPID: NOT DETECTED
FLUBV AG UPPER RESP QL IA.RAPID: NOT DETECTED
INTERNAL CONTROL: ABNORMAL
Lab: ABNORMAL
SARS-COV-2 AG UPPER RESP QL IA.RAPID: DETECTED

## 2024-05-31 PROCEDURE — 87428 SARSCOV & INF VIR A&B AG IA: CPT | Performed by: NURSE PRACTITIONER

## 2024-05-31 PROCEDURE — 99213 OFFICE O/P EST LOW 20 MIN: CPT | Performed by: NURSE PRACTITIONER

## 2024-05-31 PROCEDURE — 1159F MED LIST DOCD IN RCRD: CPT | Performed by: NURSE PRACTITIONER

## 2024-05-31 PROCEDURE — 3074F SYST BP LT 130 MM HG: CPT | Performed by: NURSE PRACTITIONER

## 2024-05-31 PROCEDURE — 1126F AMNT PAIN NOTED NONE PRSNT: CPT | Performed by: NURSE PRACTITIONER

## 2024-05-31 PROCEDURE — 3079F DIAST BP 80-89 MM HG: CPT | Performed by: NURSE PRACTITIONER

## 2024-05-31 PROCEDURE — 1160F RVW MEDS BY RX/DR IN RCRD: CPT | Performed by: NURSE PRACTITIONER

## 2024-05-31 NOTE — PROGRESS NOTES
"Chief Complaint  Cough (Patient states she was exposed by  states her symptoms started last night ), Nasal Congestion, Headache, and Generalized Body Aches    Subjective        Tish Urias presents to [facility]  Cough  This is a new problem. The current episode started yesterday. The cough is Productive of yellow sputum. Associated symptoms include chills, a fever, headaches, myalgias, nasal congestion and postnasal drip. Pertinent negatives include no chest pain or shortness of breath. Risk factors: tylenol, coricidin HBP.       Objective   Vital Signs:  /81 (BP Location: Left arm, Patient Position: Sitting, Cuff Size: Adult)   Pulse 89   Temp 98.2 °F (36.8 °C) (Oral)   Ht 158.8 cm (62.52\")   Wt 74.8 kg (165 lb)   SpO2 96%   BMI 29.68 kg/m²   Estimated body mass index is 29.68 kg/m² as calculated from the following:    Height as of this encounter: 158.8 cm (62.52\").    Weight as of this encounter: 74.8 kg (165 lb).               Physical Exam  HENT:      Head: Normocephalic.      Nose: Rhinorrhea present.      Mouth/Throat:      Pharynx: Posterior oropharyngeal erythema present. No oropharyngeal exudate.   Cardiovascular:      Rate and Rhythm: Normal rate and regular rhythm.   Pulmonary:      Effort: Pulmonary effort is normal. No respiratory distress.      Breath sounds: Normal breath sounds. No stridor. No wheezing, rhonchi or rales.   Skin:     General: Skin is warm and dry.   Neurological:      Mental Status: She is alert and oriented to person, place, and time.   Psychiatric:         Mood and Affect: Mood normal.        Result Review :            Results for orders placed or performed in visit on 05/31/24   POCT SARS-CoV-2 Antigen CHRISTI + Flu    Specimen: Swab   Result Value Ref Range    SARS Antigen Detected (A) Not Detected, Presumptive Negative    Influenza A Antigen CHRISTI Not Detected Not Detected    Influenza B Antigen CHRISTI Not Detected Not Detected    " Internal Control Passed Passed    Lot Number [id number]     Expiration Date 11/2/24               Assessment and Plan     Diagnoses and all orders for this visit:    1. COVID (Primary)  Comments:  Does not wish to receive Paxlovid, continue over-the-counter symptomatic treatment, add Mucinex, discussed current CDC guidelines ER for CP or SOA    2. Acute cough  -     POCT SARS-CoV-2 Antigen CHRISTI + Flu    3. Nasal congestion  -     POCT SARS-CoV-2 Antigen CHRISTI + Flu    4. Body aches  -     POCT SARS-CoV-2 Antigen CHRISTI + Flu             Follow Up     Return if symptoms worsen or fail to improve.  Patient was given instructions and counseling regarding her condition or for health maintenance advice. Please see specific information pulled into the AVS if appropriate.

## 2024-11-11 ENCOUNTER — OFFICE VISIT (OUTPATIENT)
Dept: FAMILY MEDICINE CLINIC | Age: 79
End: 2024-11-11
Payer: MEDICARE

## 2024-11-11 ENCOUNTER — LAB (OUTPATIENT)
Dept: LAB | Facility: HOSPITAL | Age: 79
End: 2024-11-11
Payer: MEDICARE

## 2024-11-11 VITALS
DIASTOLIC BLOOD PRESSURE: 73 MMHG | TEMPERATURE: 99 F | BODY MASS INDEX: 30.12 KG/M2 | HEART RATE: 69 BPM | OXYGEN SATURATION: 95 % | HEIGHT: 63 IN | WEIGHT: 170 LBS | SYSTOLIC BLOOD PRESSURE: 125 MMHG

## 2024-11-11 DIAGNOSIS — Z12.31 ENCOUNTER FOR SCREENING MAMMOGRAM FOR MALIGNANT NEOPLASM OF BREAST: ICD-10-CM

## 2024-11-11 DIAGNOSIS — G50.0 TRIGEMINAL NEURALGIA: ICD-10-CM

## 2024-11-11 DIAGNOSIS — Z00.00 PHYSICAL EXAM: Primary | ICD-10-CM

## 2024-11-11 DIAGNOSIS — Z78.0 ASYMPTOMATIC POSTMENOPAUSAL STATE: ICD-10-CM

## 2024-11-11 DIAGNOSIS — I10 ESSENTIAL HYPERTENSION: ICD-10-CM

## 2024-11-11 DIAGNOSIS — K21.9 GASTROESOPHAGEAL REFLUX DISEASE, UNSPECIFIED WHETHER ESOPHAGITIS PRESENT: ICD-10-CM

## 2024-11-11 DIAGNOSIS — Z79.899 OTHER LONG TERM (CURRENT) DRUG THERAPY: ICD-10-CM

## 2024-11-11 DIAGNOSIS — J30.9 ALLERGIC RHINITIS, UNSPECIFIED SEASONALITY, UNSPECIFIED TRIGGER: ICD-10-CM

## 2024-11-11 DIAGNOSIS — E03.9 ACQUIRED HYPOTHYROIDISM: ICD-10-CM

## 2024-11-11 LAB
ALBUMIN SERPL-MCNC: 4.2 G/DL (ref 3.5–5.2)
ALBUMIN/GLOB SERPL: 1.7 G/DL
ALP SERPL-CCNC: 108 U/L (ref 39–117)
ALT SERPL W P-5'-P-CCNC: 13 U/L (ref 1–33)
ANION GAP SERPL CALCULATED.3IONS-SCNC: 8.7 MMOL/L (ref 5–15)
AST SERPL-CCNC: 19 U/L (ref 1–32)
BILIRUB SERPL-MCNC: <0.2 MG/DL (ref 0–1.2)
BUN SERPL-MCNC: 18 MG/DL (ref 8–23)
BUN/CREAT SERPL: 16.8 (ref 7–25)
CALCIUM SPEC-SCNC: 9.2 MG/DL (ref 8.6–10.5)
CARBAMAZEPINE SERPL-MCNC: 3.2 MCG/ML (ref 4–12)
CHLORIDE SERPL-SCNC: 102 MMOL/L (ref 98–107)
CO2 SERPL-SCNC: 26.3 MMOL/L (ref 22–29)
CREAT SERPL-MCNC: 1.07 MG/DL (ref 0.57–1)
DEPRECATED RDW RBC AUTO: 44.4 FL (ref 37–54)
EGFRCR SERPLBLD CKD-EPI 2021: 53.9 ML/MIN/1.73
ERYTHROCYTE [DISTWIDTH] IN BLOOD BY AUTOMATED COUNT: 13.1 % (ref 12.3–15.4)
GLOBULIN UR ELPH-MCNC: 2.5 GM/DL
GLUCOSE SERPL-MCNC: 101 MG/DL (ref 65–99)
HCT VFR BLD AUTO: 38.3 % (ref 34–46.6)
HGB BLD-MCNC: 12.3 G/DL (ref 12–15.9)
MCH RBC QN AUTO: 29.4 PG (ref 26.6–33)
MCHC RBC AUTO-ENTMCNC: 32.1 G/DL (ref 31.5–35.7)
MCV RBC AUTO: 91.6 FL (ref 79–97)
PLATELET # BLD AUTO: 238 10*3/MM3 (ref 140–450)
PMV BLD AUTO: 8.5 FL (ref 6–12)
POTASSIUM SERPL-SCNC: 4.2 MMOL/L (ref 3.5–5.2)
PROT SERPL-MCNC: 6.7 G/DL (ref 6–8.5)
RBC # BLD AUTO: 4.18 10*6/MM3 (ref 3.77–5.28)
SODIUM SERPL-SCNC: 137 MMOL/L (ref 136–145)
TSH SERPL DL<=0.05 MIU/L-ACNC: 2.88 UIU/ML (ref 0.27–4.2)
WBC NRBC COR # BLD AUTO: 6.91 10*3/MM3 (ref 3.4–10.8)

## 2024-11-11 PROCEDURE — 1126F AMNT PAIN NOTED NONE PRSNT: CPT | Performed by: FAMILY MEDICINE

## 2024-11-11 PROCEDURE — 3074F SYST BP LT 130 MM HG: CPT | Performed by: FAMILY MEDICINE

## 2024-11-11 PROCEDURE — 80156 ASSAY CARBAMAZEPINE TOTAL: CPT

## 2024-11-11 PROCEDURE — 1159F MED LIST DOCD IN RCRD: CPT | Performed by: FAMILY MEDICINE

## 2024-11-11 PROCEDURE — 1160F RVW MEDS BY RX/DR IN RCRD: CPT | Performed by: FAMILY MEDICINE

## 2024-11-11 PROCEDURE — G0439 PPPS, SUBSEQ VISIT: HCPCS | Performed by: FAMILY MEDICINE

## 2024-11-11 PROCEDURE — 36415 COLL VENOUS BLD VENIPUNCTURE: CPT

## 2024-11-11 PROCEDURE — 1170F FXNL STATUS ASSESSED: CPT | Performed by: FAMILY MEDICINE

## 2024-11-11 PROCEDURE — 85027 COMPLETE CBC AUTOMATED: CPT

## 2024-11-11 PROCEDURE — 84443 ASSAY THYROID STIM HORMONE: CPT

## 2024-11-11 PROCEDURE — 80053 COMPREHEN METABOLIC PANEL: CPT

## 2024-11-11 PROCEDURE — 3078F DIAST BP <80 MM HG: CPT | Performed by: FAMILY MEDICINE

## 2024-11-11 PROCEDURE — 99214 OFFICE O/P EST MOD 30 MIN: CPT | Performed by: FAMILY MEDICINE

## 2024-11-11 RX ORDER — ATENOLOL 50 MG/1
50 TABLET ORAL DAILY
Qty: 90 TABLET | Refills: 3 | Status: SHIPPED | OUTPATIENT
Start: 2024-11-11

## 2024-11-11 RX ORDER — LEVOTHYROXINE SODIUM 88 UG/1
88 TABLET ORAL DAILY
Qty: 90 TABLET | Refills: 3 | Status: SHIPPED | OUTPATIENT
Start: 2024-11-11

## 2024-11-11 RX ORDER — LOSARTAN POTASSIUM 50 MG/1
50 TABLET ORAL DAILY
Qty: 90 TABLET | Refills: 3 | Status: SHIPPED | OUTPATIENT
Start: 2024-11-11

## 2024-11-11 RX ORDER — MONTELUKAST SODIUM 10 MG/1
10 TABLET ORAL DAILY
Qty: 90 TABLET | Refills: 3 | Status: SHIPPED | OUTPATIENT
Start: 2024-11-11

## 2024-11-11 RX ORDER — CARBAMAZEPINE 100 MG/1
100 CAPSULE, EXTENDED RELEASE ORAL NIGHTLY
Qty: 90 CAPSULE | Refills: 3 | Status: SHIPPED | OUTPATIENT
Start: 2024-11-11

## 2024-11-11 NOTE — PROGRESS NOTES
The ABCs of the Annual Wellness Visit  Subsequent Medicare Wellness Visit    Subjective    Tish Urias is a 78 y.o. patient who presents for a Subsequent Medicare Wellness Visit.    The following portions of the patient's history were reviewed and updated as appropriate: allergies, current medications, past family history, past medical history, past social history, past surgical history, and problem list.    Compared to one year ago, the patient feels her physical health is the same.    Compared to one year ago, the patient feels her mental health is the same.    Recent Hospitalizations:  She was not admitted to the hospital during the last year.     Current Medical Providers:  Patient Care Team:  Samir Coto MD as PCP - General (Family Medicine)    Outpatient Medications Prior to Visit   Medication Sig Dispense Refill    b complex-C-folic acid 1 MG capsule Take 1 capsule by mouth Daily.      cholecalciferol (VITAMIN D3) 1000 UNITS tablet Take 1 tablet by mouth Daily.      Ketotifen Fumarate (ALAWAY OP) Apply 1 drop to eye(s) as directed by provider 2 (Two) Times a Day As Needed (FOR ALLERGIES).      latanoprost (XALATAN) 0.005 % ophthalmic solution Administer 1 drop to both eyes Every Night.      loratadine (CLARITIN) 10 MG tablet Take 1 tablet by mouth Daily.      LOTEMAX 0.5 % gel ophthalmic gel Administer 1 drop to both eyes 4 (Four) Times a Day As Needed (ALLERIGIES).      mupirocin (BACTROBAN) 2 % nasal ointment into the nostril(s) as directed by provider 2 (Two) Times a Day. (Patient taking differently: Administer  into the nostril(s) as directed by provider As Needed.) 1 g 1    atenolol (TENORMIN) 50 MG tablet Take 1 tablet by mouth Daily. 90 tablet 3    carBAMazepine (CARBATROL) 100 MG 12 hr capsule Take 1 capsule by mouth Every Night. 90 capsule 3    levothyroxine (SYNTHROID, LEVOTHROID) 88 MCG tablet Take 1 tablet by mouth Daily. 90 tablet 3    losartan (COZAAR) 50 MG tablet Take 1 tablet by  "mouth Daily. 90 tablet 3    omeprazole (priLOSEC) 20 MG capsule Take 1 capsule by mouth Daily. 90 capsule 3    montelukast (SINGULAIR) 10 MG tablet Take 1 tablet by mouth Daily. (Patient not taking: Reported on 11/11/2024) 90 tablet 3     No facility-administered medications prior to visit.       No opioid medication identified on active medication list. I have reviewed chart for other potential  high risk medication/s and harmful drug interactions in the elderly.      Aspirin is not on active medication list.  Aspirin use is not indicated based on review of current medical condition/s. Risk of harm outweighs potential benefits.      Patient Active Problem List   Diagnosis    Gallstones    Calculus of bile duct without cholecystitis and without obstruction    Epigastric pain    Irritable bowel syndrome without diarrhea    S/P reverse total shoulder arthroplasty, left    Hypothyroidism    Essential hypertension    Trigeminal neuralgia    Rash and nonspecific skin eruption    Mixed hyperlipidemia    Other long term (current) drug therapy     Advance Care Planning  Advance Directive is on file.  ACP discussion was held with the patient during this visit. Patient has an advance directive in EMR which is still valid.  Her  and children would make decisions if needed.     Objective    Vitals:    11/11/24 0905   BP: 125/73   BP Location: Left arm   Patient Position: Sitting   Pulse: 69   Temp: 99 °F (37.2 °C)   TempSrc: Oral   SpO2: 95%   Weight: 77.1 kg (170 lb)   Height: 158.8 cm (62.52\")   PainSc: 0-No pain     Estimated body mass index is 30.58 kg/m² as calculated from the following:    Height as of this encounter: 158.8 cm (62.52\").    Weight as of this encounter: 77.1 kg (170 lb).    BMI is >= 30 and <35. (Class 1 Obesity). The following options were offered after discussion;: exercise counseling/recommendations and nutrition counseling/recommendations    Does the patient have evidence of cognitive impairment? " No        HEALTH RISK ASSESSMENT    Smoking Status:  Social History     Tobacco Use   Smoking Status Never   Smokeless Tobacco Never     Alcohol Consumption:  Social History     Substance and Sexual Activity   Alcohol Use No     Fall Risk Screen:    BORISADI Fall Risk Assessment was completed, and patient is at LOW risk for falls.Assessment completed on:2024    Depression Screenin/11/2024     9:04 AM   PHQ-2/PHQ-9 Depression Screening   Little interest or pleasure in doing things Not at all   Feeling down, depressed, or hopeless Not at all   How difficult have these problems made it for you to do your work, take care of things at home, or get along with other people? Not difficult at all       Health Habits and Functional and Cognitive Screenin/11/2024     9:04 AM   Functional & Cognitive Status   Do you have difficulty preparing food and eating? No   Do you have difficulty bathing yourself, getting dressed or grooming yourself? No   Do you have difficulty using the toilet? No   Do you have difficulty moving around from place to place? No   Do you have trouble with steps or getting out of a bed or a chair? No   Current Diet Well Balanced Diet   Dental Exam Up to date   Eye Exam Up to date   Exercise (times per week) 0 times per week   Current Exercises Include No Regular Exercise   Do you need help using the phone?  No   Are you deaf or do you have serious difficulty hearing?  No   Do you need help to go to places out of walking distance? No   Do you need help shopping? No   Do you need help preparing meals?  No   Do you need help with housework?  No   Do you need help with laundry? No   Do you need help taking your medications? No   Do you need help managing money? No   Do you ever drive or ride in a car without wearing a seat belt? No   Have you felt unusual stress, anger or loneliness in the last month? No   Who do you live with? Spouse   If you need help, do you have trouble finding  someone available to you? No   Have you been bothered in the last four weeks by sexual problems? No   Do you have difficulty concentrating, remembering or making decisions? No       Age-appropriate Screening Schedule:  Refer to the list below for future screening recommendations based on patient's age, sex and/or medical conditions. Orders for these recommended tests are listed in the plan section. The patient has been provided with a written plan.    Health Maintenance   Topic Date Due    RSV Vaccine - Adults (1 - 1-dose 75+ series) Never done    MAMMOGRAM  01/18/2025    DXA SCAN  01/18/2025    INFLUENZA VACCINE  03/31/2025 (Originally 8/1/2024)    COVID-19 Vaccine (4 - 2024-25 season) 11/11/2025 (Originally 9/1/2024)    Pneumococcal Vaccine 65+ (2 of 2 - PCV) 11/11/2025 (Originally 8/10/2022)    LIPID PANEL  11/11/2025 (Originally 7/6/2024)    ANNUAL WELLNESS VISIT  11/11/2025    BMI FOLLOWUP  11/11/2025    COLORECTAL CANCER SCREENING  11/30/2026    TDAP/TD VACCINES (2 - Td or Tdap) 02/21/2029    HEPATITIS C SCREENING  Completed    ZOSTER VACCINE  Completed          CMS Preventative Services Quick Reference  Risk Factors Identified During Encounter  Immunizations Discussed/Encouraged: Influenza, Prevnar 20 (Pneumococcal 20-valent conjugate), COVID19, and RSV (Respiratory Syncytial Virus)  The above risks/problems have been discussed with the patient.  Pertinent information has been shared with the patient in the After Visit Summary.  An After Visit Summary and PPPS were made available to the patient.    Follow Up:   Next Medicare Wellness visit to be scheduled in 1 year.     Additional E&M Note during same encounter follows:  Patient has multiple medical problems which are significant and separately identifiable that require additional work above and beyond the Medicare Wellness Visit.      Chief Complaint:  Blood pressure, thyroid, trigeminal neuralgia    Subjective    History of Present Illness:  In addition to  "the Medicare wellness exam, Tish is also here for follow-up on her usual care.  She has hypertension and remains on treatment for this as noted.  Her blood pressure is well-controlled today.  She does not have any obvious side effects from the blood pressure other medications.     She also has hypothyroidism and takes levothyroxine 88 mcg daily.  She is due for recheck on this today.     She also has trigeminal neuralgia for which she takes generic Tegretol.  She has been on this for many years.  She attempted to wean off of carbamazepine in the last 2 years and had recurrent difficulty with that.     Tish also has elevated cholesterol.  We have discussed statin therapy in the past, but she declines cholesterol-lowering medication.    Review of Systems:  Review of Systems   Constitutional:  Negative for chills and fever.   Respiratory:  Negative for cough and shortness of breath.    Cardiovascular:  Negative for chest pain and palpitations.   Gastrointestinal:  Negative for abdominal pain, nausea and vomiting.      Objective   Vital Signs:  /73 (BP Location: Left arm, Patient Position: Sitting)   Pulse 69   Temp 99 °F (37.2 °C) (Oral)   Ht 158.8 cm (62.52\")   Wt 77.1 kg (170 lb)   SpO2 95%   BMI 30.58 kg/m²     Physical Exam  Vitals and nursing note reviewed.   Constitutional:       General: She is not in acute distress.     Appearance: She is not ill-appearing.   HENT:      Right Ear: Tympanic membrane and ear canal normal.      Left Ear: Tympanic membrane and ear canal normal.      Ears:      Comments: Hearing is normal with forced whisper bilaterally.     Mouth/Throat:      Mouth: Mucous membranes are moist.      Comments: Pharynx appears normal  Eyes:      Extraocular Movements: Extraocular movements intact.      Pupils: Pupils are equal, round, and reactive to light.      Comments: Binocular vision is 20/20 with correction.   Neck:      Thyroid: No thyromegaly.   Cardiovascular:      Rate and " "Rhythm: Normal rate and regular rhythm.      Heart sounds: No murmur heard.  Pulmonary:      Effort: Pulmonary effort is normal.      Breath sounds: Normal breath sounds.   Abdominal:      General: There is no distension.      Palpations: Abdomen is soft. There is no mass.      Tenderness: There is no abdominal tenderness.   Musculoskeletal:      Cervical back: Normal range of motion.   Skin:     Findings: No lesion or rash.   Neurological:      General: No focal deficit present.      Mental Status: She is oriented to person, place, and time.      Cranial Nerves: No cranial nerve deficit.      Motor: No weakness.      Coordination: Coordination normal.      Gait: Gait normal.   Psychiatric:         Mood and Affect: Mood normal.     The following data was reviewed by Samir Coto MD on 11/11/2024.  Lab Results   Component Value Date    WBC 6.25 02/21/2024    HGB 12.3 02/21/2024    HCT 37.5 02/21/2024    MCV 90.1 02/21/2024     02/21/2024     Lab Results   Component Value Date    GLUCOSE 100 (H) 02/21/2024    BUN 22 02/21/2024    CREATININE 1.02 (H) 02/21/2024     02/21/2024    K 4.6 02/21/2024     02/21/2024    CALCIUM 9.2 02/21/2024    PROTEINTOT 6.2 02/21/2024    ALBUMIN 4.3 02/21/2024    ALT 13 02/21/2024    AST 21 02/21/2024    ALKPHOS 103 02/21/2024    BILITOT 0.2 02/21/2024    GLOB 1.9 02/21/2024    AGRATIO 2.3 02/21/2024    BCR 21.6 02/21/2024    ANIONGAP 8.0 02/21/2024    EGFR 57.1 (L) 02/21/2024     Lab Results   Component Value Date    CHOL 246 (H) 07/06/2023    CHLPL 267 (H) 02/23/2021    TRIG 209 (H) 07/06/2023    HDL 44 07/06/2023     (H) 07/06/2023     Lab Results   Component Value Date    TSH 2.840 02/21/2024     No results found for: \"HGBA1C\"            Assessment and Plan:   Today, we have reviewed her care.  Overall, Tish seems well.  Regarding the Medicare wellness exam, she is currently up-to-date on recommended cancer screenings.  We reviewed vaccines as noted " above.    Regarding her usual care, we will refill her medications and update labs as noted below.  Tentative follow-up with me will be again in about a year, sooner if needed.    Diagnoses and all orders for this visit:    1. Physical exam (Primary)    2. Essential hypertension  -     atenolol (TENORMIN) 50 MG tablet; Take 1 tablet by mouth Daily.  Dispense: 90 tablet; Refill: 3  -     Comprehensive Metabolic Panel; Future  -     losartan (COZAAR) 50 MG tablet; Take 1 tablet by mouth Daily.  Dispense: 90 tablet; Refill: 3    3. Acquired hypothyroidism  -     TSH; Future  -     levothyroxine (SYNTHROID, LEVOTHROID) 88 MCG tablet; Take 1 tablet by mouth Daily.  Dispense: 90 tablet; Refill: 3    4. Trigeminal neuralgia  -     Carbamazepine level, total; Future  -     carBAMazepine (CARBATROL) 100 MG 12 hr capsule; Take 1 capsule by mouth Every Night.  Dispense: 90 capsule; Refill: 3    5. Other long term (current) drug therapy  -     Carbamazepine level, total; Future  -     CBC (No Diff); Future    6. Gastroesophageal reflux disease, unspecified whether esophagitis present  -     omeprazole (priLOSEC) 20 MG capsule; Take 1 capsule by mouth Daily.  Dispense: 90 capsule; Refill: 3    7. Allergic rhinitis, unspecified seasonality, unspecified trigger  -     montelukast (SINGULAIR) 10 MG tablet; Take 1 tablet by mouth Daily.  Dispense: 90 tablet; Refill: 3    8. Asymptomatic postmenopausal state  -     DEXA Bone Density Axial; Future    9. Encounter for screening mammogram for malignant neoplasm of breast  -     Mammo Screening Digital Tomosynthesis Bilateral With CAD; Future       Follow Up  Return in about 1 year (around 11/11/2025) for Recheck, Medicare Wellness.  Patient was given instructions and counseling regarding her condition or for health maintenance advice. Please see specific information pulled into the AVS if appropriate.

## 2024-12-06 ENCOUNTER — OFFICE VISIT (OUTPATIENT)
Dept: FAMILY MEDICINE CLINIC | Age: 79
End: 2024-12-06
Payer: MEDICARE

## 2024-12-06 VITALS
TEMPERATURE: 98.7 F | WEIGHT: 165.2 LBS | OXYGEN SATURATION: 93 % | DIASTOLIC BLOOD PRESSURE: 80 MMHG | HEIGHT: 63 IN | SYSTOLIC BLOOD PRESSURE: 132 MMHG | HEART RATE: 80 BPM | BODY MASS INDEX: 29.27 KG/M2

## 2024-12-06 DIAGNOSIS — J06.9 ACUTE URI: Primary | ICD-10-CM

## 2024-12-06 DIAGNOSIS — R05.1 ACUTE COUGH: ICD-10-CM

## 2024-12-06 LAB
EXPIRATION DATE: NORMAL
FLUAV AG UPPER RESP QL IA.RAPID: NOT DETECTED
FLUBV AG UPPER RESP QL IA.RAPID: NOT DETECTED
INTERNAL CONTROL: NORMAL
Lab: NORMAL
SARS-COV-2 AG UPPER RESP QL IA.RAPID: NOT DETECTED

## 2024-12-06 PROCEDURE — 87428 SARSCOV & INF VIR A&B AG IA: CPT

## 2024-12-06 PROCEDURE — 3079F DIAST BP 80-89 MM HG: CPT

## 2024-12-06 PROCEDURE — 99213 OFFICE O/P EST LOW 20 MIN: CPT

## 2024-12-06 PROCEDURE — 3075F SYST BP GE 130 - 139MM HG: CPT

## 2024-12-06 PROCEDURE — 1159F MED LIST DOCD IN RCRD: CPT

## 2024-12-06 PROCEDURE — 1126F AMNT PAIN NOTED NONE PRSNT: CPT

## 2024-12-06 PROCEDURE — 1160F RVW MEDS BY RX/DR IN RCRD: CPT

## 2024-12-06 RX ORDER — DEXTROMETHORPHAN HYDROBROMIDE AND PROMETHAZINE HYDROCHLORIDE 15; 6.25 MG/5ML; MG/5ML
5 SYRUP ORAL NIGHTLY PRN
Qty: 120 ML | Refills: 0 | Status: SHIPPED | OUTPATIENT
Start: 2024-12-06

## 2024-12-06 RX ORDER — DOXYCYCLINE 100 MG/1
100 CAPSULE ORAL 2 TIMES DAILY
Qty: 14 CAPSULE | Refills: 0 | Status: SHIPPED | OUTPATIENT
Start: 2024-12-06 | End: 2024-12-13

## 2024-12-06 NOTE — PROGRESS NOTES
"Subjective     CHIEF COMPLAINT    Chief Complaint   Patient presents with    Cough    Fever     History of Present Illness  Patient is a 79-year-old female, presenting to the clinic today with complaints of cough, rhinorrhea, sore throat, body aches.  She had a fever of 100.5 yesterday.  Symptoms have been present almost a week. Has been taking DayQuil, NyQuil and Delsym.  She does not smoke and has no history of asthma or other chronic lung disease.  Cough is somewhat productive.  Notes sinus pressure and pain.      Review of Systems   Constitutional:  Positive for fever. Negative for chills.   HENT:  Positive for rhinorrhea, sinus pressure, sinus pain and sore throat. Negative for congestion.    Respiratory:  Positive for cough. Negative for shortness of breath and wheezing.    Cardiovascular:  Negative for chest pain.   Gastrointestinal:  Negative for nausea and vomiting.   Musculoskeletal:  Positive for myalgias.   Neurological:  Negative for headaches.       Past Medical History:   Diagnosis Date    Allergic rhinitis     Anemia     Anesthesia complication     WITH CARPAL TUNNEL RELEASE, \"RELAXING MEDICINE\" HAD ISSUES WITH ITCHING\"    Arthritis     Cholelithiasis     GERD (gastroesophageal reflux disease)     History of anemia     History of glaucoma     History of osteoporosis     History of varicose veins     WITH TORI LEG VEIN STRIPPING    Hyperlipidemia     Hypertension     Hypothyroidism     Left shoulder pain     Rotator cuff disorder, left     Sinus infection     WITH COUGH. RECOVERING FROM. WAS TREATED WITH ZPAC    Trigeminal neuralgia     Yeast infection     SCALP. BEEN TREATED. RESOLVED            Past Surgical History:   Procedure Laterality Date    CARPAL TUNNEL RELEASE Right 2009    CATARACT EXTRACTION      LEFT AND RIGHT    CHOLECYSTECTOMY      CHOLECYSTECTOMY WITH INTRAOPERATIVE CHOLANGIOGRAM N/A 12/16/2016    Procedure: CHOLECYSTECTOMY LAPAROSCOPIC INTRAOPERATIVE CHOLANGIOGRAM;  Surgeon: Chaitanya" MD Clara;  Location: [facility] OR [facility] [place];  Service:     COLONOSCOPY N/A 2014    done at [facility]     ENDOSCOPY N/A 04/17/2017    Procedure: ESOPHAGOGASTRODUODENOSCOPY WITH COLD BIOPSIES;  Surgeon: Shaheen Andre MD;  Location: [facility] ENDOSCOPY;  Service: gastritis    ERCP  12/17/2016    Jes CHISHOLM   Sphincterotopy performed, biliary tree swept, choledocholithiasis    RI ERCP DX COLLECTION SPECIMEN BRUSHING/WASHING N/A 12/17/2016    Procedure: ENDOSCOPIC RETROGRADE CHOLANGIOPANCREATOGRAPHY WITH SPHINCTEROTOMY AND BALLOON SWEEP AND STONE EXTRACTION;  Surgeon: Shaheen Andre MD;  Location: [facility] ENDOSCOPY;  Service: Gastroenterology    SHOULDER ARTHROSCOPY W/ ROTATOR CUFF REPAIR Left 04/30/2019    Procedure: LEFT SHOULDER ARTHROSCOPY, BICEPS TENOTOMY, DEBRIDEMENT OF ROTATOR CUFF AND LABRUM;  Surgeon: Yoseph Galvan MD;  Location: [facility] OR [facility] [place];  Service: Orthopedics    TONSILLECTOMY      TOTAL SHOULDER ARTHROPLASTY W/ DISTAL CLAVICLE EXCISION Left 05/28/2019    Procedure: LEFT TOTAL SHOULDER REVERSE ARTHROPLASTY;  Surgeon: Yoseph Galvan MD;  Location: [facility] OR [facility] [place];  Service: Orthopedics    VARICOSE VEIN SURGERY      LEFT AND RIGHT            Family History   Problem Relation Age of Onset    Malig Hyperthermia Neg Hx             Social History     Socioeconomic History    Marital status:    Tobacco Use    Smoking status: Never    Smokeless tobacco: Never   Vaping Use    Vaping status: Never Used   Substance and Sexual Activity    Alcohol use: No    Drug use: No    Sexual activity: Not Currently     Partners: Male     Birth control/protection: Post-menopausal            Allergies   Allergen Reactions    Penicillins Rash            Current Outpatient Medications on File Prior to Visit   Medication Sig Dispense Refill    atenolol (TENORMIN) 50 MG tablet Take 1 tablet by mouth Daily. 90 tablet 3    b complex-C-folic acid 1 MG capsule Take 1 capsule by mouth Daily.      carBAMazepine  "(CARBATROL) 100 MG 12 hr capsule Take 1 capsule by mouth Every Night. 90 capsule 3    cholecalciferol (VITAMIN D3) 1000 UNITS tablet Take 1 tablet by mouth Daily.      Ketotifen Fumarate (ALAWAY OP) Apply 1 drop to eye(s) as directed by provider 2 (Two) Times a Day As Needed (FOR ALLERGIES).      latanoprost (XALATAN) 0.005 % ophthalmic solution Administer 1 drop to both eyes Every Night.      levothyroxine (SYNTHROID, LEVOTHROID) 88 MCG tablet Take 1 tablet by mouth Daily. 90 tablet 3    loratadine (CLARITIN) 10 MG tablet Take 1 tablet by mouth Daily.      losartan (COZAAR) 50 MG tablet Take 1 tablet by mouth Daily. 90 tablet 3    LOTEMAX 0.5 % gel ophthalmic gel Administer 1 drop to both eyes 4 (Four) Times a Day As Needed (ALLERIGIES).      montelukast (SINGULAIR) 10 MG tablet Take 1 tablet by mouth Daily. 90 tablet 3    mupirocin (BACTROBAN) 2 % nasal ointment into the nostril(s) as directed by provider 2 (Two) Times a Day. (Patient taking differently: Administer  into the nostril(s) as directed by provider As Needed.) 1 g 1    omeprazole (priLOSEC) 20 MG capsule Take 1 capsule by mouth Daily. 90 capsule 3     No current facility-administered medications on file prior to visit.     /80 (BP Location: Right arm, Patient Position: Sitting, Cuff Size: Adult)   Pulse 80   Temp 98.7 °F (37.1 °C) (Oral)   Ht 158.8 cm (62.52\")   Wt 74.9 kg (165 lb 3.2 oz)   SpO2 93%   BMI 29.71 kg/m²       Objective     Physical Exam  Vitals and nursing note reviewed.   Constitutional:       General: She is not in acute distress.     Appearance: Normal appearance. She is not ill-appearing.   HENT:      Head: Normocephalic.      Right Ear: Tympanic membrane, ear canal and external ear normal.      Left Ear: Tympanic membrane, ear canal and external ear normal.      Nose: Nose normal.      Right Sinus: No maxillary sinus tenderness or frontal sinus tenderness.      Left Sinus: No maxillary sinus tenderness or frontal sinus " tenderness.      Mouth/Throat:      Lips: Pink.      Mouth: Mucous membranes are moist.      Pharynx: Oropharynx is clear. Uvula midline. No pharyngeal swelling, oropharyngeal exudate, posterior oropharyngeal erythema or uvula swelling.   Eyes:      Pupils: Pupils are equal, round, and reactive to light.   Cardiovascular:      Rate and Rhythm: Normal rate and regular rhythm.      Heart sounds: Normal heart sounds. No murmur heard.  Pulmonary:      Effort: Pulmonary effort is normal. No accessory muscle usage or respiratory distress.      Breath sounds: Normal breath sounds. No wheezing or rhonchi.   Musculoskeletal:      Cervical back: Normal range of motion.   Lymphadenopathy:      Cervical: No cervical adenopathy.   Skin:     General: Skin is warm and dry.   Neurological:      General: No focal deficit present.      Mental Status: She is alert and oriented to person, place, and time.   Psychiatric:         Mood and Affect: Mood and affect normal.         Behavior: Behavior normal.         Results for orders placed or performed in visit on 12/06/24   POCT SARS-CoV-2 Antigen CHRISTI + Flu    Collection Time: 12/06/24 11:07 AM    Specimen: Swab   Result Value Ref Range    SARS Antigen Not Detected Not Detected, Presumptive Negative    Influenza A Antigen CHRISTI Not Detected Not Detected    Influenza B Antigen CHRISTI Not Detected Not Detected    Internal Control Passed Passed    Lot Number [id number]     Expiration Date 07-              Assessment & Plan  Acute URI  Patient is negative for COVID and flu on rapid testing today. At this time, no evidence of bacterial infection on exam, recommend symptomatic treatment which was discussed with patient. Given duration of symptoms, I have sent in doxycyline for her should her symptoms fail to improve over the weekend. Increase fluid intake and rest. Return precautions advised.     Orders:    doxycycline (VIBRAMYCIN) 100 MG capsule; Take 1 capsule by mouth 2 (Two) Times a Day for  7 days.    Acute cough    Orders:    POCT SARS-CoV-2 Antigen CHRISTI + Flu    promethazine-dextromethorphan (PROMETHAZINE-DM) 6.25-15 MG/5ML syrup; Take 5 mL by mouth At Night As Needed for Cough.        Follow up:  Return if symptoms worsen or fail to improve.  Patient was given instructions and counseling regarding her condition or for health maintenance advice. Please see specific information pulled into the AVS if appropriate.

## 2025-02-06 ENCOUNTER — HOSPITAL ENCOUNTER (OUTPATIENT)
Dept: MAMMOGRAPHY | Facility: HOSPITAL | Age: 80
Discharge: HOME | End: 2025-02-06
Admitting: FAMILY MEDICINE
Payer: MEDICARE

## 2025-02-06 DIAGNOSIS — Z12.31 ENCOUNTER FOR SCREENING MAMMOGRAM FOR MALIGNANT NEOPLASM OF BREAST: ICD-10-CM

## 2025-02-06 PROCEDURE — 77063 BREAST TOMOSYNTHESIS BI: CPT

## 2025-02-06 PROCEDURE — 77067 SCR MAMMO BI INCL CAD: CPT

## 2025-02-17 ENCOUNTER — TELEPHONE (OUTPATIENT)
Dept: FAMILY MEDICINE CLINIC | Age: 80
End: 2025-02-17
Payer: MEDICARE

## 2025-02-17 NOTE — TELEPHONE ENCOUNTER
Spoke with pt regarding overdue dexa order. Pt states she would like to hold off on completing and will just discuss with pcp when she see's him in November.

## 2025-05-12 ENCOUNTER — TELEPHONE (OUTPATIENT)
Dept: FAMILY MEDICINE CLINIC | Age: 80
End: 2025-05-12
Payer: MEDICARE

## 2025-05-12 DIAGNOSIS — E03.9 ACQUIRED HYPOTHYROIDISM: ICD-10-CM

## 2025-05-12 DIAGNOSIS — Z79.899 OTHER LONG TERM (CURRENT) DRUG THERAPY: ICD-10-CM

## 2025-05-12 DIAGNOSIS — G50.0 TRIGEMINAL NEURALGIA: ICD-10-CM

## 2025-05-12 DIAGNOSIS — I10 ESSENTIAL HYPERTENSION: Primary | ICD-10-CM

## 2025-05-12 NOTE — TELEPHONE ENCOUNTER
----- Message from Nereida SANDOVAL sent at 11/12/2024  8:15 AM EST -----  TICKLE for carbamazepine level, CMP, TSH, CBC no differential in 6 months for essential hypertension, acquired hypothyroidism, trigeminal neuralgia, other long-term drug therapy.  Thanks.

## 2025-06-02 ENCOUNTER — TELEPHONE (OUTPATIENT)
Dept: FAMILY MEDICINE CLINIC | Age: 80
End: 2025-06-02
Payer: COMMERCIAL

## 2025-06-04 ENCOUNTER — LAB (OUTPATIENT)
Dept: LAB | Facility: HOSPITAL | Age: 80
End: 2025-06-04
Payer: MEDICARE

## 2025-06-04 DIAGNOSIS — I10 ESSENTIAL HYPERTENSION: ICD-10-CM

## 2025-06-04 DIAGNOSIS — E03.9 ACQUIRED HYPOTHYROIDISM: ICD-10-CM

## 2025-06-04 DIAGNOSIS — G50.0 TRIGEMINAL NEURALGIA: ICD-10-CM

## 2025-06-04 DIAGNOSIS — R73.9 HYPERGLYCEMIA: ICD-10-CM

## 2025-06-04 DIAGNOSIS — Z79.899 OTHER LONG TERM (CURRENT) DRUG THERAPY: ICD-10-CM

## 2025-06-04 LAB
ALBUMIN SERPL-MCNC: 4.1 G/DL (ref 3.5–5.2)
ALBUMIN/GLOB SERPL: 1.5 G/DL
ALP SERPL-CCNC: 110 U/L (ref 39–117)
ALT SERPL W P-5'-P-CCNC: 18 U/L (ref 1–33)
ANION GAP SERPL CALCULATED.3IONS-SCNC: 9.8 MMOL/L (ref 5–15)
AST SERPL-CCNC: 20 U/L (ref 1–32)
BILIRUB SERPL-MCNC: 0.3 MG/DL (ref 0–1.2)
BUN SERPL-MCNC: 25 MG/DL (ref 8–23)
BUN/CREAT SERPL: 22.1 (ref 7–25)
CALCIUM SPEC-SCNC: 9.2 MG/DL (ref 8.6–10.5)
CARBAMAZEPINE SERPL-MCNC: 3.5 MCG/ML (ref 4–12)
CHLORIDE SERPL-SCNC: 106 MMOL/L (ref 98–107)
CO2 SERPL-SCNC: 23.2 MMOL/L (ref 22–29)
CREAT SERPL-MCNC: 1.13 MG/DL (ref 0.57–1)
DEPRECATED RDW RBC AUTO: 44.8 FL (ref 37–54)
EGFRCR SERPLBLD CKD-EPI 2021: 50.2 ML/MIN/1.73
ERYTHROCYTE [DISTWIDTH] IN BLOOD BY AUTOMATED COUNT: 13.2 % (ref 12.3–15.4)
GLOBULIN UR ELPH-MCNC: 2.7 GM/DL
GLUCOSE SERPL-MCNC: 128 MG/DL (ref 65–99)
HCT VFR BLD AUTO: 37.6 % (ref 34–46.6)
HGB BLD-MCNC: 12.1 G/DL (ref 12–15.9)
MCH RBC QN AUTO: 29.4 PG (ref 26.6–33)
MCHC RBC AUTO-ENTMCNC: 32.2 G/DL (ref 31.5–35.7)
MCV RBC AUTO: 91.5 FL (ref 79–97)
PLATELET # BLD AUTO: 237 10*3/MM3 (ref 140–450)
PMV BLD AUTO: 9 FL (ref 6–12)
POTASSIUM SERPL-SCNC: 4.7 MMOL/L (ref 3.5–5.2)
PROT SERPL-MCNC: 6.8 G/DL (ref 6–8.5)
RBC # BLD AUTO: 4.11 10*6/MM3 (ref 3.77–5.28)
SODIUM SERPL-SCNC: 139 MMOL/L (ref 136–145)
TSH SERPL DL<=0.05 MIU/L-ACNC: 1.94 UIU/ML (ref 0.27–4.2)
WBC NRBC COR # BLD AUTO: 5.92 10*3/MM3 (ref 3.4–10.8)

## 2025-06-04 PROCEDURE — 80053 COMPREHEN METABOLIC PANEL: CPT

## 2025-06-04 PROCEDURE — 80156 ASSAY CARBAMAZEPINE TOTAL: CPT

## 2025-06-04 PROCEDURE — 36415 COLL VENOUS BLD VENIPUNCTURE: CPT

## 2025-06-04 PROCEDURE — 85027 COMPLETE CBC AUTOMATED: CPT

## 2025-06-04 PROCEDURE — 84443 ASSAY THYROID STIM HORMONE: CPT

## 2025-06-04 PROCEDURE — 83036 HEMOGLOBIN GLYCOSYLATED A1C: CPT

## 2025-06-05 ENCOUNTER — RESULTS FOLLOW-UP (OUTPATIENT)
Dept: FAMILY MEDICINE CLINIC | Age: 80
End: 2025-06-05
Payer: COMMERCIAL

## 2025-06-05 LAB — HBA1C MFR BLD: 5.7 % (ref 4.8–5.6)

## 2025-06-05 NOTE — LETTER
Tish Urias  [address] [address]    2025     Good morning, Tish.  I hope you are well.  Please be aware the following regarding your testin.  The random glucose is 128.  Your A1c is 5.7%.  This means that your average blood sugar over the last 90 days has been around 110.  There is no evidence for type 2 diabetes.  2.  The carbamazepine level is flagged as being below goal.  However, the level is reasonable for the diagnosis of trigeminal neuralgia.  3.  The BUN and creatinine, measures of kidney function are above normal.  The creatinine, the more important measure, is 1.13 which is up from 1.07 on the last check.  This may be in part due to medication effect or fluid status.  The finding can reflect a mild decline in the filtering function of the kidneys.  In reviewing your numbers over the last 3 years, this is relatively stable.  I would not recommend a change in your care or medications.  Be sure to take in adequate fluids day-to-day though, and avoid most over-the-counter medications, especially NSAIDs such as ibuprofen and naproxen.  We will continue to keep an eye on this moving forward.  4.  The rest of your labs are basically normal including tests for the electrolytes, liver, thyroid, and blood counts.     Otherwise, continue your current care and medications, and plan to see me again in November as scheduled, sooner if needed.  Let me know if you have other concerns.     Samir Coto MD  [facility]      Resulted Orders   CBC No Differential   Result Value Ref Range    WBC 5.92 3.40 - 10.80 10*3/mm3    RBC 4.11 3.77 - 5.28 10*6/mm3    Hemoglobin 12.1 12.0 - 15.9 g/dL    Hematocrit 37.6 34.0 - 46.6 %    MCV 91.5 79.0 - 97.0 fL    MCH 29.4 26.6 - 33.0 pg    MCHC 32.2 31.5 - 35.7 g/dL    RDW 13.2 12.3 - 15.4 %    RDW-SD 44.8 37.0 - 54.0 fl    MPV 9.0 6.0 - 12.0 fL    Platelets 237 140 - 450 10*3/mm3   Carbamazepine level, total   Result Value Ref  Range    Carbamazepine Level 3.5 (L) 4.0 - 12.0 mcg/mL   Comprehensive metabolic panel   Result Value Ref Range    Glucose 128 (H) 65 - 99 mg/dL    BUN 25.0 (H) 8.0 - 23.0 mg/dL    Creatinine 1.13 (H) 0.57 - 1.00 mg/dL    Sodium 139 136 - 145 mmol/L    Potassium 4.7 3.5 - 5.2 mmol/L    Chloride 106 98 - 107 mmol/L    CO2 23.2 22.0 - 29.0 mmol/L    Calcium 9.2 8.6 - 10.5 mg/dL    Total Protein 6.8 6.0 - 8.5 g/dL    Albumin 4.1 3.5 - 5.2 g/dL    ALT (SGPT) 18 1 - 33 U/L    AST (SGOT) 20 1 - 32 U/L    Alkaline Phosphatase 110 39 - 117 U/L    Total Bilirubin 0.3 0.0 - 1.2 mg/dL    Globulin 2.7 gm/dL    A/G Ratio 1.5 g/dL    BUN/Creatinine Ratio 22.1 7.0 - 25.0    Anion Gap 9.8 5.0 - 15.0 mmol/L    eGFR 50.2 (L) >60.0 mL/min/1.73   TSH   Result Value Ref Range    TSH 1.940 0.270 - 4.200 uIU/mL   Hemoglobin A1c   Result Value Ref Range    Hemoglobin A1C 5.70 (H) 4.80 - 5.60 %

## (undated) DEVICE — PK ARTHSCP SHLDR TOWER 40

## (undated) DEVICE — GLV SURG BIOGEL LTX PF 7

## (undated) DEVICE — FRCP BIOP RADLJAW4 HOT 2.2X240 BX40

## (undated) DEVICE — SUT MNCRYL 3/0 PS2 18IN MCP497G

## (undated) DEVICE — GLV SURG BIOGEL LTX PF 8

## (undated) DEVICE — ANTIBACTERIAL UNDYED BRAIDED (POLYGLACTIN 910), SYNTHETIC ABSORBABLE SUTURE: Brand: COATED VICRYL

## (undated) DEVICE — SKIN PREP TRAY W/CHG: Brand: MEDLINE INDUSTRIES, INC.

## (undated) DEVICE — CANN NASL CO2 TRULINK W/O2 A/

## (undated) DEVICE — SUT ETHLN 4/0 PS2 PLSTC 1667G

## (undated) DEVICE — BLD SHAVER RESEC SABRE COOLCUT 5MM 13CM

## (undated) DEVICE — BUR SHAVER FLUSHCUT 8FLUT 5MM 13CM

## (undated) DEVICE — 3M™ STERI-STRIP™ REINFORCED ADHESIVE SKIN CLOSURES, R1547, 1/2 IN X 4 IN (12 MM X 100 MM), 6 STRIPS/ENVELOPE: Brand: 3M™ STERI-STRIP™

## (undated) DEVICE — 2108 SERIES SAGITTAL BLADE (19.5 X 1.27 X 81.0MM)

## (undated) DEVICE — ARM SLING: Brand: DEROYAL

## (undated) DEVICE — DRAPE,SHOULDER,BEACH ULTRAGARD: Brand: MEDLINE

## (undated) DEVICE — NDL HYPO PRECISIONGLIDE/REG 18G 11/2 PNK

## (undated) DEVICE — GLV SURG TRIUMPH CLASSIC PF LTX 7.5 STRL

## (undated) DEVICE — VAPR TRIPOLAR 90 DEGREES SUCTION ELECTRODE 90 DEGREES SUCTION WITH INTEGRATED HANDPIECE AND HAND CONTROLS: Brand: VAPR

## (undated) DEVICE — SYR LUERLOK 30CC

## (undated) DEVICE — IRRIGATOR BULB ASEPTO 60CC STRL

## (undated) DEVICE — POSTN ARMSLV LAT/TRACTION DISP

## (undated) DEVICE — Device

## (undated) DEVICE — TUBING, SUCTION, 1/4" X 10', STRAIGHT: Brand: MEDLINE

## (undated) DEVICE — ENCORE® LATEX ORTHO SIZE 7, STERILE LATEX POWDER-FREE SURGICAL GLOVE: Brand: ENCORE

## (undated) DEVICE — MAT FLR ABSORBENT LG 4FT 10 2.5FT

## (undated) DEVICE — DRAPE,U/ SHT,SPLIT,PLAS,STERIL: Brand: MEDLINE

## (undated) DEVICE — Device: Brand: DEFENDO AIR/WATER/SUCTION AND BIOPSY VALVE

## (undated) DEVICE — DRSNG WND GZ CURAD OIL EMULSION 3X3IN STRL

## (undated) DEVICE — DRSNG GZ PETROLTM XEROFORM CURAD 1X8IN STRL

## (undated) DEVICE — PK SHLDR OPN 40

## (undated) DEVICE — TUBING SET, GRAVITY, 4-SPIKE

## (undated) DEVICE — SUT NONABS MAXBRAID/PE NMBR2 HC5 38IN BLU 900334
Type: IMPLANTABLE DEVICE | Site: SHOULDER | Status: NON-FUNCTIONAL
Removed: 2019-05-28

## (undated) DEVICE — CANN TRPL DAM 7X7MM NO VLV

## (undated) DEVICE — BITEBLOCK OMNI BLOC